# Patient Record
Sex: FEMALE | Race: WHITE | NOT HISPANIC OR LATINO | Employment: OTHER | URBAN - METROPOLITAN AREA
[De-identification: names, ages, dates, MRNs, and addresses within clinical notes are randomized per-mention and may not be internally consistent; named-entity substitution may affect disease eponyms.]

---

## 2017-01-09 ENCOUNTER — OFFICE VISIT (OUTPATIENT)
Dept: INTERNAL MEDICINE | Facility: CLINIC | Age: 62
End: 2017-01-09

## 2017-01-09 VITALS
HEIGHT: 65 IN | WEIGHT: 181 LBS | DIASTOLIC BLOOD PRESSURE: 72 MMHG | BODY MASS INDEX: 30.16 KG/M2 | SYSTOLIC BLOOD PRESSURE: 130 MMHG | HEART RATE: 60 BPM

## 2017-01-09 DIAGNOSIS — K21.9 GERD WITHOUT ESOPHAGITIS: ICD-10-CM

## 2017-01-09 DIAGNOSIS — E03.8 OTHER SPECIFIED HYPOTHYROIDISM: ICD-10-CM

## 2017-01-09 DIAGNOSIS — G43.109 MIGRAINE WITH AURA AND WITHOUT STATUS MIGRAINOSUS, NOT INTRACTABLE: ICD-10-CM

## 2017-01-09 DIAGNOSIS — R73.02 GLUCOSE INTOLERANCE (IMPAIRED GLUCOSE TOLERANCE): ICD-10-CM

## 2017-01-09 DIAGNOSIS — M25.50 ARTHRALGIA, UNSPECIFIED JOINT: ICD-10-CM

## 2017-01-09 DIAGNOSIS — I10 ESSENTIAL HYPERTENSION: Primary | ICD-10-CM

## 2017-01-09 DIAGNOSIS — D75.1 POLYCYTHEMIA: ICD-10-CM

## 2017-01-09 DIAGNOSIS — E78.5 ELEVATED LIPIDS: ICD-10-CM

## 2017-01-09 LAB
25(OH)D3 SERPL-MCNC: 36.4 NG/ML
ALBUMIN SERPL-MCNC: 4.3 G/DL (ref 3.2–4.8)
ALBUMIN/GLOB SERPL: 1.8 G/DL (ref 1.5–2.5)
ALP SERPL-CCNC: 79 U/L (ref 25–100)
ALT SERPL W P-5'-P-CCNC: 25 U/L (ref 7–40)
ANION GAP SERPL CALCULATED.3IONS-SCNC: 6 MMOL/L (ref 3–11)
ARTICHOKE IGE QN: 141 MG/DL (ref 0–130)
AST SERPL-CCNC: 24 U/L (ref 0–33)
BILIRUB SERPL-MCNC: 0.5 MG/DL (ref 0.3–1.2)
BUN BLD-MCNC: 20 MG/DL (ref 9–23)
BUN/CREAT SERPL: 25 (ref 7–25)
CALCIUM SPEC-SCNC: 9.5 MG/DL (ref 8.7–10.4)
CHLORIDE SERPL-SCNC: 104 MMOL/L (ref 99–109)
CHOLEST SERPL-MCNC: 251 MG/DL (ref 0–200)
CO2 SERPL-SCNC: 38 MMOL/L (ref 20–31)
CREAT BLD-MCNC: 0.8 MG/DL (ref 0.6–1.3)
DEPRECATED RDW RBC AUTO: 42.2 FL (ref 37–54)
ERYTHROCYTE [DISTWIDTH] IN BLOOD BY AUTOMATED COUNT: 13.3 % (ref 11.3–14.5)
GFR SERPL CREATININE-BSD FRML MDRD: 73 ML/MIN/1.73
GLOBULIN UR ELPH-MCNC: 2.4 GM/DL
GLUCOSE BLD-MCNC: 106 MG/DL (ref 70–100)
HCT VFR BLD AUTO: 42.2 % (ref 34.5–44)
HDLC SERPL-MCNC: 80 MG/DL (ref 40–60)
HGB BLD-MCNC: 14.7 G/DL (ref 11.5–15.5)
MCH RBC QN AUTO: 30.4 PG (ref 27–31)
MCHC RBC AUTO-ENTMCNC: 34.8 G/DL (ref 32–36)
MCV RBC AUTO: 87.4 FL (ref 80–99)
PLATELET # BLD AUTO: 229 10*3/MM3 (ref 150–450)
PMV BLD AUTO: 10.4 FL (ref 6–12)
POTASSIUM BLD-SCNC: 4.2 MMOL/L (ref 3.5–5.5)
PROT SERPL-MCNC: 6.7 G/DL (ref 5.7–8.2)
RBC # BLD AUTO: 4.83 10*6/MM3 (ref 3.89–5.14)
SODIUM BLD-SCNC: 148 MMOL/L (ref 132–146)
TRIGL SERPL-MCNC: 104 MG/DL (ref 0–150)
TSH SERPL DL<=0.05 MIU/L-ACNC: 2.53 MIU/ML (ref 0.35–5.35)
VIT B12 BLD-MCNC: 1848 PG/ML (ref 211–911)
WBC NRBC COR # BLD: 7.39 10*3/MM3 (ref 3.5–10.8)

## 2017-01-09 PROCEDURE — 82306 VITAMIN D 25 HYDROXY: CPT | Performed by: INTERNAL MEDICINE

## 2017-01-09 PROCEDURE — 99214 OFFICE O/P EST MOD 30 MIN: CPT | Performed by: INTERNAL MEDICINE

## 2017-01-09 PROCEDURE — 82607 VITAMIN B-12: CPT | Performed by: INTERNAL MEDICINE

## 2017-01-09 PROCEDURE — 80061 LIPID PANEL: CPT | Performed by: INTERNAL MEDICINE

## 2017-01-09 PROCEDURE — 85027 COMPLETE CBC AUTOMATED: CPT | Performed by: INTERNAL MEDICINE

## 2017-01-09 PROCEDURE — 84443 ASSAY THYROID STIM HORMONE: CPT | Performed by: INTERNAL MEDICINE

## 2017-01-09 PROCEDURE — 80053 COMPREHEN METABOLIC PANEL: CPT | Performed by: INTERNAL MEDICINE

## 2017-01-09 PROCEDURE — 36415 COLL VENOUS BLD VENIPUNCTURE: CPT | Performed by: INTERNAL MEDICINE

## 2017-01-09 RX ORDER — PRAVASTATIN SODIUM 40 MG
40 TABLET ORAL DAILY
Qty: 90 TABLET | Refills: 3 | Status: SHIPPED | OUTPATIENT
Start: 2017-01-09 | End: 2017-08-21 | Stop reason: SDUPTHER

## 2017-01-09 NOTE — PROGRESS NOTES
Patient is a 61 y.o. female who is here for a follow up of chronic conditions.  Chief Complaint   Patient presents with   • Hypertension   • Hypothyroidism   • Hyperlipidemia         HPI:  Here for f/u.  Doing ok.  Energy level is good.  No cold intolerance.  No palpitations.  BP has been ok.  No dizziness or lightheadedness.  No HA's.  GERD is controlled. Little activity over the winter.     History:    Patient Active Problem List   Diagnosis   • Elevated lipids   • GERD without esophagitis   • Hypertension   • Hypothyroidism   • Glucose intolerance (impaired glucose tolerance)   • Polycythemia   • Migraine with aura   • Joint pain   • Maxillary sinusitis, acute       Past Medical History   Diagnosis Date   • Chemical exposure      phenteramine   • Fibrocystic breast    • Joint pain    • Migraine with aura    • Needle stick injury        Past Surgical History   Procedure Laterality Date   • Tubal abdominal ligation  1980   • Incisional breast biopsy Left 1998   • Breast biopsy Left    • Breast excisional biopsy     • Breast biopsy Right    • Abdominal hysterectomy Bilateral 2007     Removal Of Both Ovaries       Current Outpatient Prescriptions on File Prior to Visit   Medication Sig   • bisoprolol-hydrochlorothiazide (ZIAC) 5-6.25 MG per tablet daily.   • estradiol (CLIMARA) 0.1 MG/24HR patch 1 patch 1 (one) time per week.   • levothyroxine (SYNTHROID, LEVOTHROID) 150 MCG tablet Take 1 tablet by mouth daily.   • losartan-hydrochlorothiazide (HYZAAR) 100-25 MG per tablet daily.   • omeprazole (PriLOSEC) 40 MG capsule daily.   • traMADol (ULTRAM) 50 MG tablet TAKE ONE TABLET BY MOUTH THREE TIMES DAILY AS NEEDED   • [DISCONTINUED] amoxicillin (AMOXIL) 500 MG capsule    • [DISCONTINUED] doxycycline (VIBRAMYCIN) 100 MG capsule Take 1 capsule by mouth 2 (two) times a day.   • [DISCONTINUED] rosuvastatin (CRESTOR) 5 MG tablet every night.     No current facility-administered medications on file prior to visit.   "      Family History   Problem Relation Age of Onset   • Heart disease Other    • Diabetes Other    • Stroke Other    • Breast cancer Other    • Breast cancer Paternal Aunt 50       Social History     Social History   • Marital status:      Spouse name: N/A   • Number of children: N/A   • Years of education: N/A     Occupational History   • Not on file.     Social History Main Topics   • Smoking status: Former Smoker   • Smokeless tobacco: Not on file   • Alcohol use Not on file   • Drug use: No   • Sexual activity: Not on file     Other Topics Concern   • Not on file     Social History Narrative         ROS:    Review of Systems   Constitutional: Positive for fatigue. Negative for chills, diaphoresis, fever and unexpected weight change.   HENT: Negative for congestion, ear pain, facial swelling, hearing loss, nosebleeds, postnasal drip, sinus pressure and sore throat.    Eyes: Negative for pain, discharge and itching.   Respiratory: Negative for cough, chest tightness, shortness of breath and wheezing.    Cardiovascular: Negative for chest pain, palpitations and leg swelling.   Gastrointestinal: Negative for abdominal distention, abdominal pain, blood in stool, constipation, diarrhea, nausea and vomiting.        3/17 colonoscopy due   Endocrine: Negative for polydipsia and polyuria.   Genitourinary: Negative for difficulty urinating, dysuria, frequency and hematuria.        11/16 mammogram   Musculoskeletal: Positive for arthralgias. Negative for back pain, gait problem, joint swelling and myalgias.   Skin: Negative for rash and wound.   Neurological: Negative for dizziness, syncope, weakness and headaches.   Psychiatric/Behavioral: Negative for dysphoric mood and sleep disturbance. The patient is not nervous/anxious.        Visit Vitals   • /72 (BP Location: Left arm, Patient Position: Sitting)   • Pulse 60   • Ht 65\" (165.1 cm)   • Wt 181 lb (82.1 kg)   • LMP  (LMP Unknown)   • Breastfeeding Yes   • " BMI 30.12 kg/m2       Physical Exam:    Physical Exam   Constitutional: She is oriented to person, place, and time. She appears well-developed and well-nourished.   HENT:   Head: Normocephalic and atraumatic.   Right Ear: External ear normal.   Left Ear: External ear normal.   Mouth/Throat: Oropharynx is clear and moist.   Eyes: Conjunctivae and EOM are normal.   Neck: Normal range of motion. Neck supple.   Cardiovascular: Normal rate, regular rhythm and normal heart sounds.    Pulmonary/Chest: Effort normal and breath sounds normal.   Abdominal: Soft. Bowel sounds are normal.   Musculoskeletal: Normal range of motion.   Lymphadenopathy:     She has no cervical adenopathy.   Neurological: She is alert and oriented to person, place, and time.   Skin: Skin is warm and dry.   Psychiatric: She has a normal mood and affect. Her behavior is normal. Thought content normal.       Procedure:      Discussion/Summary:  hyperlipidemia-labs today, counseled on diet  htn-advised low NA and exercise  hypothryoid-lab today  gerd-stable  abnormal H/H-labs today  high risk meds- labs today      labs noted and dw patient , will rx pravastatin          Current Outpatient Prescriptions:   •  bisoprolol-hydrochlorothiazide (ZIAC) 5-6.25 MG per tablet, daily., Disp: , Rfl:   •  estradiol (CLIMARA) 0.1 MG/24HR patch, 1 patch 1 (one) time per week., Disp: , Rfl:   •  levothyroxine (SYNTHROID, LEVOTHROID) 150 MCG tablet, Take 1 tablet by mouth daily., Disp: 90 tablet, Rfl: 1  •  losartan-hydrochlorothiazide (HYZAAR) 100-25 MG per tablet, daily., Disp: , Rfl:   •  omeprazole (PriLOSEC) 40 MG capsule, daily., Disp: , Rfl:   •  traMADol (ULTRAM) 50 MG tablet, TAKE ONE TABLET BY MOUTH THREE TIMES DAILY AS NEEDED, Disp: 90 tablet, Rfl: 1  •  pravastatin (PRAVACHOL) 40 MG tablet, Take 1 tablet by mouth Daily., Disp: 90 tablet, Rfl: 3        Demetria was seen today for hypertension, hypothyroidism and hyperlipidemia.    Diagnoses and all orders for  this visit:    Essential hypertension  -     CBC (No Diff)    Migraine with aura and without status migrainosus, not intractable    GERD without esophagitis    Glucose intolerance (impaired glucose tolerance)    Other specified hypothyroidism    Arthralgia, unspecified joint  -     Vitamin D 25 Hydroxy    Polycythemia  -     Vitamin B12    Elevated lipids  -     Comprehensive Metabolic Panel  -     Lipid Panel  -     TSH  -     pravastatin (PRAVACHOL) 40 MG tablet; Take 1 tablet by mouth Daily.

## 2017-03-07 ENCOUNTER — OFFICE VISIT (OUTPATIENT)
Dept: INTERNAL MEDICINE | Facility: CLINIC | Age: 62
End: 2017-03-07

## 2017-03-07 VITALS
WEIGHT: 181 LBS | HEIGHT: 65 IN | BODY MASS INDEX: 30.16 KG/M2 | SYSTOLIC BLOOD PRESSURE: 125 MMHG | DIASTOLIC BLOOD PRESSURE: 74 MMHG | HEART RATE: 60 BPM

## 2017-03-07 DIAGNOSIS — I10 ESSENTIAL HYPERTENSION: Primary | ICD-10-CM

## 2017-03-07 DIAGNOSIS — R73.02 GLUCOSE INTOLERANCE (IMPAIRED GLUCOSE TOLERANCE): ICD-10-CM

## 2017-03-07 DIAGNOSIS — F17.201 TOBACCO ABUSE, IN REMISSION: ICD-10-CM

## 2017-03-07 DIAGNOSIS — M25.511 ACUTE PAIN OF RIGHT SHOULDER: ICD-10-CM

## 2017-03-07 DIAGNOSIS — K21.9 GERD WITHOUT ESOPHAGITIS: ICD-10-CM

## 2017-03-07 DIAGNOSIS — Z87.891 HISTORY OF TOBACCO USE: ICD-10-CM

## 2017-03-07 DIAGNOSIS — M25.50 ARTHRALGIA, UNSPECIFIED JOINT: ICD-10-CM

## 2017-03-07 DIAGNOSIS — E03.8 OTHER SPECIFIED HYPOTHYROIDISM: ICD-10-CM

## 2017-03-07 DIAGNOSIS — E78.5 ELEVATED LIPIDS: ICD-10-CM

## 2017-03-07 DIAGNOSIS — G43.109 MIGRAINE WITH AURA AND WITHOUT STATUS MIGRAINOSUS, NOT INTRACTABLE: ICD-10-CM

## 2017-03-07 LAB
ALBUMIN SERPL-MCNC: 4.4 G/DL (ref 3.2–4.8)
ALBUMIN/GLOB SERPL: 1.7 G/DL (ref 1.5–2.5)
ALP SERPL-CCNC: 70 U/L (ref 25–100)
ALT SERPL W P-5'-P-CCNC: 26 U/L (ref 7–40)
ANION GAP SERPL CALCULATED.3IONS-SCNC: 1 MMOL/L (ref 3–11)
ARTICHOKE IGE QN: 100 MG/DL (ref 0–130)
AST SERPL-CCNC: 26 U/L (ref 0–33)
BILIRUB SERPL-MCNC: 0.6 MG/DL (ref 0.3–1.2)
BUN BLD-MCNC: 23 MG/DL (ref 9–23)
BUN/CREAT SERPL: 25.6 (ref 7–25)
CALCIUM SPEC-SCNC: 10.2 MG/DL (ref 8.7–10.4)
CHLORIDE SERPL-SCNC: 100 MMOL/L (ref 99–109)
CHOLEST SERPL-MCNC: 194 MG/DL (ref 0–200)
CO2 SERPL-SCNC: 37 MMOL/L (ref 20–31)
CREAT BLD-MCNC: 0.9 MG/DL (ref 0.6–1.3)
GFR SERPL CREATININE-BSD FRML MDRD: 63 ML/MIN/1.73
GLOBULIN UR ELPH-MCNC: 2.6 GM/DL
GLUCOSE BLD-MCNC: 108 MG/DL (ref 70–100)
HDLC SERPL-MCNC: 77 MG/DL (ref 40–60)
POTASSIUM BLD-SCNC: 4 MMOL/L (ref 3.5–5.5)
PROT SERPL-MCNC: 7 G/DL (ref 5.7–8.2)
SODIUM BLD-SCNC: 138 MMOL/L (ref 132–146)
TRIGL SERPL-MCNC: 132 MG/DL (ref 0–150)

## 2017-03-07 PROCEDURE — 99214 OFFICE O/P EST MOD 30 MIN: CPT | Performed by: INTERNAL MEDICINE

## 2017-03-07 PROCEDURE — 80053 COMPREHEN METABOLIC PANEL: CPT | Performed by: INTERNAL MEDICINE

## 2017-03-07 PROCEDURE — 36415 COLL VENOUS BLD VENIPUNCTURE: CPT | Performed by: INTERNAL MEDICINE

## 2017-03-07 PROCEDURE — 80061 LIPID PANEL: CPT | Performed by: INTERNAL MEDICINE

## 2017-03-07 NOTE — PROGRESS NOTES
Patient is a 61 y.o. female who is here for a follow up of chronic conditions.  Chief Complaint   Patient presents with   • Hypertension   • Hypothyroidism   • Hyperlipidemia         HPI:  Here for f/u.  Patient c/o 3 month hx of shoulder pain. Using motrin with some improvement.  Also will use tramadol.  No myalgia with the statin.  No dizziness or lightheadedness.  No abdominal pains.     History:    Patient Active Problem List   Diagnosis   • Elevated lipids   • GERD without esophagitis   • Hypertension   • Hypothyroidism   • Glucose intolerance (impaired glucose tolerance)   • Polycythemia   • Migraine with aura   • Joint pain   • Acute pain of right shoulder       Past Medical History   Diagnosis Date   • Chemical exposure      phenteramine   • Fibrocystic breast    • Joint pain    • Migraine with aura    • Needle stick injury        Past Surgical History   Procedure Laterality Date   • Tubal abdominal ligation  1980   • Incisional breast biopsy Left 1998   • Breast biopsy Left    • Breast excisional biopsy     • Breast biopsy Right    • Abdominal hysterectomy Bilateral 2007     Removal Of Both Ovaries       Current Outpatient Prescriptions on File Prior to Visit   Medication Sig   • bisoprolol-hydrochlorothiazide (ZIAC) 5-6.25 MG per tablet daily.   • estradiol (CLIMARA) 0.1 MG/24HR patch 1 patch 1 (one) time per week.   • levothyroxine (SYNTHROID, LEVOTHROID) 150 MCG tablet Take 1 tablet by mouth daily.   • losartan-hydrochlorothiazide (HYZAAR) 100-25 MG per tablet daily.   • omeprazole (PriLOSEC) 40 MG capsule daily.   • pravastatin (PRAVACHOL) 40 MG tablet Take 1 tablet by mouth Daily.   • traMADol (ULTRAM) 50 MG tablet TAKE ONE TABLET BY MOUTH THREE TIMES DAILY AS NEEDED     No current facility-administered medications on file prior to visit.        Family History   Problem Relation Age of Onset   • Heart disease Other    • Diabetes Other    • Stroke Other    • Breast cancer Other    • Breast cancer  "Paternal Aunt 50       Social History     Social History   • Marital status:      Spouse name: N/A   • Number of children: N/A   • Years of education: N/A     Occupational History   • Not on file.     Social History Main Topics   • Smoking status: Former Smoker   • Smokeless tobacco: Not on file   • Alcohol use Not on file   • Drug use: No   • Sexual activity: Not on file     Other Topics Concern   • Not on file     Social History Narrative         ROS:    Review of Systems   Constitutional: Positive for fatigue. Negative for chills, diaphoresis, fever and unexpected weight change.   HENT: Negative for congestion, ear pain, facial swelling, hearing loss, nosebleeds, postnasal drip, sinus pressure and sore throat.    Eyes: Negative for pain, discharge and itching.   Respiratory: Negative for cough, chest tightness, shortness of breath and wheezing.    Cardiovascular: Negative for chest pain, palpitations and leg swelling.   Gastrointestinal: Negative for abdominal distention, abdominal pain, blood in stool, constipation, diarrhea, nausea and vomiting.        3/17 colonoscopy due   Endocrine: Negative for polydipsia and polyuria.   Genitourinary: Negative for difficulty urinating, dysuria, frequency and hematuria.        11/16 mammogram   Musculoskeletal: Positive for arthralgias. Negative for back pain, gait problem, joint swelling and myalgias.   Skin: Negative for rash and wound.   Neurological: Negative for dizziness, syncope, weakness and headaches.   Psychiatric/Behavioral: Negative for dysphoric mood and sleep disturbance. The patient is not nervous/anxious.        Visit Vitals   • /74   • Pulse 60   • Ht 65\" (165.1 cm)   • Wt 181 lb (82.1 kg)   • LMP  (LMP Unknown)   • BMI 30.12 kg/m2       Physical Exam:    Physical Exam   Constitutional: She is oriented to person, place, and time. She appears well-developed and well-nourished.   HENT:   Head: Normocephalic and atraumatic.   Right Ear: External " ear normal.   Left Ear: External ear normal.   Mouth/Throat: Oropharynx is clear and moist.   Eyes: Conjunctivae and EOM are normal.   Neck: Normal range of motion. Neck supple.   Cardiovascular: Normal rate, regular rhythm and normal heart sounds.    Pulmonary/Chest: Effort normal and breath sounds normal.   Abdominal: Soft. Bowel sounds are normal.   Musculoskeletal: Normal range of motion. She exhibits tenderness (pain on rom of right shoulder).   Lymphadenopathy:     She has no cervical adenopathy.   Neurological: She is alert and oriented to person, place, and time.   Skin: Skin is warm and dry.   Psychiatric: She has a normal mood and affect. Her behavior is normal. Thought content normal.       Procedure:      Discussion/Summary:  hyperlipidemia-labs today, counseled on diet  htn-advised low NA and exercise  hypothryoid-lab noted  gerd-stable  abnormal H/H-labs noted  Shoulder pain-xray and f/u ortho  Other-will schedule CT of Lung given tob hx  high risk meds- labs noted      labs noted and dw patient       Current Outpatient Prescriptions:   •  bisoprolol-hydrochlorothiazide (ZIAC) 5-6.25 MG per tablet, daily., Disp: , Rfl:   •  estradiol (CLIMARA) 0.1 MG/24HR patch, 1 patch 1 (one) time per week., Disp: , Rfl:   •  levothyroxine (SYNTHROID, LEVOTHROID) 150 MCG tablet, Take 1 tablet by mouth daily., Disp: 90 tablet, Rfl: 1  •  losartan-hydrochlorothiazide (HYZAAR) 100-25 MG per tablet, daily., Disp: , Rfl:   •  omeprazole (PriLOSEC) 40 MG capsule, daily., Disp: , Rfl:   •  pravastatin (PRAVACHOL) 40 MG tablet, Take 1 tablet by mouth Daily., Disp: 90 tablet, Rfl: 3  •  traMADol (ULTRAM) 50 MG tablet, TAKE ONE TABLET BY MOUTH THREE TIMES DAILY AS NEEDED, Disp: 90 tablet, Rfl: 1        Demetria was seen today for hypertension, hypothyroidism and hyperlipidemia.    Diagnoses and all orders for this visit:    Essential hypertension    Migraine with aura and without status migrainosus, not intractable    GERD  without esophagitis    Glucose intolerance (impaired glucose tolerance)    Other specified hypothyroidism    Arthralgia, unspecified joint    Elevated lipids  -     Comprehensive Metabolic Panel  -     Lipid Panel    Acute pain of right shoulder  -     XR Shoulder 2+ View Right  -     Ambulatory Referral to Orthopedic Surgery    Tobacco abuse, in remission  -     Cancel: CT Chest Low Dose Wo    History of tobacco use  -     CT Chest Low Dose Wo

## 2017-03-14 DIAGNOSIS — E03.8 OTHER SPECIFIED HYPOTHYROIDISM: ICD-10-CM

## 2017-03-14 RX ORDER — LEVOTHYROXINE SODIUM 0.15 MG/1
150 TABLET ORAL DAILY
Qty: 90 TABLET | Refills: 1 | Status: SHIPPED | OUTPATIENT
Start: 2017-03-14 | End: 2017-08-21 | Stop reason: SDUPTHER

## 2017-03-14 RX ORDER — LOSARTAN POTASSIUM AND HYDROCHLOROTHIAZIDE 25; 100 MG/1; MG/1
1 TABLET ORAL DAILY
Qty: 90 TABLET | Refills: 1 | Status: SHIPPED | OUTPATIENT
Start: 2017-03-14 | End: 2017-08-21 | Stop reason: SDUPTHER

## 2017-03-14 RX ORDER — BISOPROLOL FUMARATE AND HYDROCHLOROTHIAZIDE 5; 6.25 MG/1; MG/1
1 TABLET ORAL DAILY
Qty: 90 TABLET | Refills: 1 | Status: SHIPPED | OUTPATIENT
Start: 2017-03-14 | End: 2017-08-21 | Stop reason: SDUPTHER

## 2017-03-20 RX ORDER — TRAMADOL HYDROCHLORIDE 50 MG/1
50 TABLET ORAL EVERY 8 HOURS PRN
Qty: 90 TABLET | Refills: 1 | OUTPATIENT
Start: 2017-03-20 | End: 2017-07-22 | Stop reason: SDUPTHER

## 2017-03-22 ENCOUNTER — HOSPITAL ENCOUNTER (OUTPATIENT)
Dept: CT IMAGING | Facility: HOSPITAL | Age: 62
Discharge: HOME | End: 2017-03-22
Attending: INTERNAL MEDICINE | Admitting: INTERNAL MEDICINE

## 2017-03-22 PROCEDURE — G0297 LDCT FOR LUNG CA SCREEN: HCPCS

## 2017-04-28 RX ORDER — OMEPRAZOLE 40 MG/1
40 CAPSULE, DELAYED RELEASE ORAL DAILY
Qty: 90 CAPSULE | Refills: 2 | Status: SHIPPED | OUTPATIENT
Start: 2017-04-28 | End: 2017-08-21 | Stop reason: SDUPTHER

## 2017-07-24 RX ORDER — TRAMADOL HYDROCHLORIDE 50 MG/1
TABLET ORAL
Qty: 90 TABLET | Refills: 1 | OUTPATIENT
Start: 2017-07-24 | End: 2017-08-21 | Stop reason: SDUPTHER

## 2017-08-21 ENCOUNTER — OFFICE VISIT (OUTPATIENT)
Dept: INTERNAL MEDICINE | Facility: CLINIC | Age: 62
End: 2017-08-21

## 2017-08-21 VITALS
WEIGHT: 175.4 LBS | HEART RATE: 68 BPM | HEIGHT: 65 IN | DIASTOLIC BLOOD PRESSURE: 74 MMHG | BODY MASS INDEX: 29.22 KG/M2 | SYSTOLIC BLOOD PRESSURE: 120 MMHG

## 2017-08-21 DIAGNOSIS — E78.5 ELEVATED LIPIDS: ICD-10-CM

## 2017-08-21 DIAGNOSIS — M15.9 PRIMARY OSTEOARTHRITIS INVOLVING MULTIPLE JOINTS: ICD-10-CM

## 2017-08-21 DIAGNOSIS — G43.109 MIGRAINE WITH AURA AND WITHOUT STATUS MIGRAINOSUS, NOT INTRACTABLE: ICD-10-CM

## 2017-08-21 DIAGNOSIS — E03.8 OTHER SPECIFIED HYPOTHYROIDISM: ICD-10-CM

## 2017-08-21 DIAGNOSIS — R73.02 GLUCOSE INTOLERANCE (IMPAIRED GLUCOSE TOLERANCE): ICD-10-CM

## 2017-08-21 DIAGNOSIS — I10 ESSENTIAL HYPERTENSION: Primary | ICD-10-CM

## 2017-08-21 DIAGNOSIS — K21.9 GERD WITHOUT ESOPHAGITIS: ICD-10-CM

## 2017-08-21 PROCEDURE — 99214 OFFICE O/P EST MOD 30 MIN: CPT | Performed by: INTERNAL MEDICINE

## 2017-08-21 RX ORDER — BISOPROLOL FUMARATE AND HYDROCHLOROTHIAZIDE 5; 6.25 MG/1; MG/1
1 TABLET ORAL DAILY
Qty: 90 TABLET | Refills: 3 | Status: SHIPPED | OUTPATIENT
Start: 2017-08-21 | End: 2017-11-14 | Stop reason: SDUPTHER

## 2017-08-21 RX ORDER — PRAVASTATIN SODIUM 40 MG
40 TABLET ORAL DAILY
Qty: 90 TABLET | Refills: 3 | Status: SHIPPED | OUTPATIENT
Start: 2017-08-21 | End: 2018-11-06 | Stop reason: SDUPTHER

## 2017-08-21 RX ORDER — LOSARTAN POTASSIUM AND HYDROCHLOROTHIAZIDE 25; 100 MG/1; MG/1
1 TABLET ORAL DAILY
Qty: 90 TABLET | Refills: 3 | Status: SHIPPED | OUTPATIENT
Start: 2017-08-21 | End: 2018-09-21 | Stop reason: SDUPTHER

## 2017-08-21 RX ORDER — LEVOTHYROXINE SODIUM 0.15 MG/1
150 TABLET ORAL DAILY
Qty: 90 TABLET | Refills: 1 | Status: SHIPPED | OUTPATIENT
Start: 2017-08-21 | End: 2017-11-03 | Stop reason: SDUPTHER

## 2017-08-21 RX ORDER — OMEPRAZOLE 40 MG/1
40 CAPSULE, DELAYED RELEASE ORAL DAILY
Qty: 90 CAPSULE | Refills: 3 | Status: SHIPPED | OUTPATIENT
Start: 2017-08-21 | End: 2018-11-05 | Stop reason: SDUPTHER

## 2017-08-21 RX ORDER — TRAMADOL HYDROCHLORIDE 50 MG/1
50 TABLET ORAL EVERY 8 HOURS PRN
Qty: 90 TABLET | Refills: 2 | OUTPATIENT
Start: 2017-08-21 | End: 2018-04-09 | Stop reason: SDUPTHER

## 2017-08-21 NOTE — PROGRESS NOTES
Answers for HPI/ROS submitted by the patient on 8/18/2017   Hypertension  Chronicity: chronic  Onset: more than 1 year ago  Progression since onset: waxing and waning  Condition status: controlled  anxiety: No  blurred vision: No  malaise/fatigue: Yes  orthopnea: No  peripheral edema: No  PND: No  sweats: No  Agents associated with hypertension: thyroid hormones  CAD risks: dyslipidemia, family history, obesity  Compliance problems: exercise  Patient is a 61 y.o. female who is here for a follow up of chronic conditions.  Chief Complaint   Patient presents with   • Hypertension   • Hyperlipidemia         HPI:  Here for f/u.  Plans for Right knee partial knee replacement this upcoming fall.  Has exhausted conservative mgmt.  BP has been good.  No dizziness or lightheadedness.  Energy level is ok.  Some heat intolerance.     History:    Patient Active Problem List   Diagnosis   • Elevated lipids   • GERD without esophagitis   • Hypertension   • Hypothyroidism   • Glucose intolerance (impaired glucose tolerance)   • Polycythemia   • Migraine with aura   • Joint pain   • Acute pain of right shoulder       Past Medical History:   Diagnosis Date   • Chemical exposure     phenteramine   • Fibrocystic breast    • Joint pain    • Migraine with aura    • Needle stick injury        Past Surgical History:   Procedure Laterality Date   • ABDOMINAL HYSTERECTOMY Bilateral 2007    Removal Of Both Ovaries   • BREAST BIOPSY Left    • BREAST BIOPSY Right    • BREAST EXCISIONAL BIOPSY     • INCISIONAL BREAST BIOPSY Left 1998   • TUBAL ABDOMINAL LIGATION  1980       Current Outpatient Prescriptions on File Prior to Visit   Medication Sig   • diclofenac (FLECTOR) 1.3 % patch patch Apply 1 patch topically 2 (Two) Times a Day.   • estradiol (CLIMARA) 0.1 MG/24HR patch 1 patch 1 (one) time per week.   • [DISCONTINUED] bisoprolol-hydrochlorothiazide (ZIAC) 5-6.25 MG per tablet Take 1 tablet by mouth Daily.   • [DISCONTINUED] levothyroxine  (SYNTHROID, LEVOTHROID) 150 MCG tablet Take 1 tablet by mouth Daily.   • [DISCONTINUED] losartan-hydrochlorothiazide (HYZAAR) 100-25 MG per tablet Take 1 tablet by mouth Daily.   • [DISCONTINUED] omeprazole (priLOSEC) 40 MG capsule Take 1 capsule by mouth Daily.   • [DISCONTINUED] pravastatin (PRAVACHOL) 40 MG tablet Take 1 tablet by mouth Daily.   • [DISCONTINUED] traMADol (ULTRAM) 50 MG tablet TAKE ONE TABLET BY MOUTH THREE TIMES DAILY AS NEEDED     No current facility-administered medications on file prior to visit.        Family History   Problem Relation Age of Onset   • Heart disease Other    • Diabetes Other    • Stroke Other    • Breast cancer Other    • Breast cancer Paternal Aunt 50       Social History     Social History   • Marital status:      Spouse name: N/A   • Number of children: N/A   • Years of education: N/A     Occupational History   • Not on file.     Social History Main Topics   • Smoking status: Former Smoker   • Smokeless tobacco: Not on file   • Alcohol use Not on file   • Drug use: No   • Sexual activity: Not on file     Other Topics Concern   • Not on file     Social History Narrative         ROS:    Review of Systems   Constitutional: Positive for fatigue. Negative for chills, diaphoresis, fever and unexpected weight change.   HENT: Negative for congestion, ear pain, facial swelling, hearing loss, nosebleeds, postnasal drip, sinus pressure and sore throat.    Eyes: Negative for pain, discharge and itching.   Respiratory: Negative for cough, chest tightness, shortness of breath and wheezing.    Cardiovascular: Negative for chest pain, palpitations and leg swelling.   Gastrointestinal: Negative for abdominal distention, abdominal pain, blood in stool, constipation, diarrhea, nausea and vomiting.        3/17 colonoscopy due   Endocrine: Negative for polydipsia and polyuria.   Genitourinary: Negative for difficulty urinating, dysuria, frequency and hematuria.        11/16 mammogram  "  Musculoskeletal: Positive for arthralgias. Negative for back pain, gait problem, joint swelling, myalgias and neck pain.   Skin: Negative for rash and wound.   Neurological: Negative for dizziness, syncope, weakness and headaches.   Psychiatric/Behavioral: Negative for dysphoric mood and sleep disturbance. The patient is not nervous/anxious.        /74  Pulse 68  Ht 65\" (165.1 cm)  Wt 175 lb 6.4 oz (79.6 kg)  LMP  (LMP Unknown)  BMI 29.19 kg/m2    Physical Exam:    Physical Exam   Constitutional: She is oriented to person, place, and time. She appears well-developed and well-nourished.   HENT:   Head: Normocephalic and atraumatic.   Right Ear: External ear normal.   Left Ear: External ear normal.   Mouth/Throat: Oropharynx is clear and moist.   Eyes: Conjunctivae and EOM are normal.   Neck: Normal range of motion. Neck supple.   Cardiovascular: Normal rate, regular rhythm and normal heart sounds.    Pulmonary/Chest: Effort normal and breath sounds normal.   Abdominal: Soft. Bowel sounds are normal.   Musculoskeletal: Normal range of motion. She exhibits tenderness (pain on rom of right shoulder).   Lymphadenopathy:     She has no cervical adenopathy.   Neurological: She is alert and oriented to person, place, and time.   Skin: Skin is warm and dry.   Psychiatric: She has a normal mood and affect. Her behavior is normal. Thought content normal.       Procedure:      Discussion/Summary:  hyperlipidemia-labs on rtc, counseled on diet  htn-advised low NA and exercise  hypothryoid-lab on rtc  gerd-stable  abnormal H/H-labs noted  high risk meds- labs on rtc      labs noted and dw patient       Current Outpatient Prescriptions:   •  bisoprolol-hydrochlorothiazide (ZIAC) 5-6.25 MG per tablet, Take 1 tablet by mouth Daily., Disp: 90 tablet, Rfl: 3  •  diclofenac (FLECTOR) 1.3 % patch patch, Apply 1 patch topically 2 (Two) Times a Day., Disp: 60 patch, Rfl: 2  •  estradiol (CLIMARA) 0.1 MG/24HR patch, 1 patch 1 " (one) time per week., Disp: , Rfl:   •  levothyroxine (SYNTHROID, LEVOTHROID) 150 MCG tablet, Take 1 tablet by mouth Daily., Disp: 90 tablet, Rfl: 1  •  losartan-hydrochlorothiazide (HYZAAR) 100-25 MG per tablet, Take 1 tablet by mouth Daily., Disp: 90 tablet, Rfl: 3  •  omeprazole (priLOSEC) 40 MG capsule, Take 1 capsule by mouth Daily., Disp: 90 capsule, Rfl: 3  •  pravastatin (PRAVACHOL) 40 MG tablet, Take 1 tablet by mouth Daily., Disp: 90 tablet, Rfl: 3  •  traMADol (ULTRAM) 50 MG tablet, Take 1 tablet by mouth Every 8 (Eight) Hours As Needed for Moderate Pain ., Disp: 90 tablet, Rfl: 2        Demetria was seen today for hypertension and hyperlipidemia.    Diagnoses and all orders for this visit:    Essential hypertension  -     bisoprolol-hydrochlorothiazide (ZIAC) 5-6.25 MG per tablet; Take 1 tablet by mouth Daily.  -     losartan-hydrochlorothiazide (HYZAAR) 100-25 MG per tablet; Take 1 tablet by mouth Daily.    Migraine with aura and without status migrainosus, not intractable    GERD without esophagitis  -     omeprazole (priLOSEC) 40 MG capsule; Take 1 capsule by mouth Daily.    Glucose intolerance (impaired glucose tolerance)    Other specified hypothyroidism  -     levothyroxine (SYNTHROID, LEVOTHROID) 150 MCG tablet; Take 1 tablet by mouth Daily.    Elevated lipids  -     pravastatin (PRAVACHOL) 40 MG tablet; Take 1 tablet by mouth Daily.    Primary osteoarthritis involving multiple joints  -     traMADol (ULTRAM) 50 MG tablet; Take 1 tablet by mouth Every 8 (Eight) Hours As Needed for Moderate Pain .

## 2017-11-01 ENCOUNTER — OFFICE VISIT (OUTPATIENT)
Dept: INTERNAL MEDICINE | Facility: CLINIC | Age: 62
End: 2017-11-01

## 2017-11-01 VITALS
HEIGHT: 65 IN | BODY MASS INDEX: 28.66 KG/M2 | WEIGHT: 172 LBS | SYSTOLIC BLOOD PRESSURE: 134 MMHG | DIASTOLIC BLOOD PRESSURE: 70 MMHG | HEART RATE: 60 BPM

## 2017-11-01 DIAGNOSIS — E78.5 ELEVATED LIPIDS: ICD-10-CM

## 2017-11-01 DIAGNOSIS — K21.9 GERD WITHOUT ESOPHAGITIS: ICD-10-CM

## 2017-11-01 DIAGNOSIS — Z01.818 PRE-OP EXAMINATION: ICD-10-CM

## 2017-11-01 DIAGNOSIS — R79.89 ABNORMAL THYROID BLOOD TEST: ICD-10-CM

## 2017-11-01 DIAGNOSIS — I10 ESSENTIAL HYPERTENSION: Primary | ICD-10-CM

## 2017-11-01 DIAGNOSIS — R73.02 GLUCOSE INTOLERANCE (IMPAIRED GLUCOSE TOLERANCE): ICD-10-CM

## 2017-11-01 DIAGNOSIS — E03.8 OTHER SPECIFIED HYPOTHYROIDISM: ICD-10-CM

## 2017-11-01 DIAGNOSIS — G43.109 MIGRAINE WITH AURA AND WITHOUT STATUS MIGRAINOSUS, NOT INTRACTABLE: ICD-10-CM

## 2017-11-01 LAB
ALBUMIN SERPL-MCNC: 4.3 G/DL (ref 3.2–4.8)
ALBUMIN/GLOB SERPL: 1.9 G/DL (ref 1.5–2.5)
ALP SERPL-CCNC: 69 U/L (ref 25–100)
ALT SERPL W P-5'-P-CCNC: 24 U/L (ref 7–40)
ANION GAP SERPL CALCULATED.3IONS-SCNC: 4 MMOL/L (ref 3–11)
APTT PPP: 27.3 SECONDS (ref 24–31)
ARTICHOKE IGE QN: 102 MG/DL (ref 0–130)
AST SERPL-CCNC: 23 U/L (ref 0–33)
BILIRUB BLD-MCNC: NEGATIVE MG/DL
BILIRUB SERPL-MCNC: 0.6 MG/DL (ref 0.3–1.2)
BUN BLD-MCNC: 23 MG/DL (ref 9–23)
BUN/CREAT SERPL: 28.8 (ref 7–25)
CALCIUM SPEC-SCNC: 9.4 MG/DL (ref 8.7–10.4)
CHLORIDE SERPL-SCNC: 104 MMOL/L (ref 99–109)
CHOLEST SERPL-MCNC: 171 MG/DL (ref 0–200)
CLARITY, POC: CLEAR
CO2 SERPL-SCNC: 32 MMOL/L (ref 20–31)
COLOR UR: YELLOW
CREAT BLD-MCNC: 0.8 MG/DL (ref 0.6–1.3)
DEPRECATED RDW RBC AUTO: 42.7 FL (ref 37–54)
ERYTHROCYTE [DISTWIDTH] IN BLOOD BY AUTOMATED COUNT: 13.2 % (ref 11.3–14.5)
GFR SERPL CREATININE-BSD FRML MDRD: 73 ML/MIN/1.73
GLOBULIN UR ELPH-MCNC: 2.3 GM/DL
GLUCOSE BLD-MCNC: 98 MG/DL (ref 70–100)
GLUCOSE UR STRIP-MCNC: NEGATIVE MG/DL
HBA1C MFR BLD: 5.7 % (ref 4.8–5.6)
HCT VFR BLD AUTO: 42.2 % (ref 34.5–44)
HDLC SERPL-MCNC: 64 MG/DL (ref 40–60)
HGB BLD-MCNC: 14 G/DL (ref 11.5–15.5)
INR PPP: 0.96
KETONES UR QL: NEGATIVE
LEUKOCYTE EST, POC: NEGATIVE
MCH RBC QN AUTO: 29.4 PG (ref 27–31)
MCHC RBC AUTO-ENTMCNC: 33.2 G/DL (ref 32–36)
MCV RBC AUTO: 88.7 FL (ref 80–99)
NITRITE UR-MCNC: NEGATIVE MG/ML
PH UR: 8 [PH] (ref 5–8)
PLATELET # BLD AUTO: 217 10*3/MM3 (ref 150–450)
PMV BLD AUTO: 10.3 FL (ref 6–12)
POTASSIUM BLD-SCNC: 3.8 MMOL/L (ref 3.5–5.5)
PROT SERPL-MCNC: 6.6 G/DL (ref 5.7–8.2)
PROT UR STRIP-MCNC: NEGATIVE MG/DL
PROTHROMBIN TIME: 10.4 SECONDS (ref 9.6–11.5)
RBC # BLD AUTO: 4.76 10*6/MM3 (ref 3.89–5.14)
RBC # UR STRIP: NEGATIVE /UL
SODIUM BLD-SCNC: 140 MMOL/L (ref 132–146)
SP GR UR: 1.01 (ref 1–1.03)
TRIGL SERPL-MCNC: 117 MG/DL (ref 0–150)
TSH SERPL DL<=0.05 MIU/L-ACNC: 5.8 MIU/ML (ref 0.35–5.35)
UROBILINOGEN UR QL: NORMAL
WBC NRBC COR # BLD: 7.75 10*3/MM3 (ref 3.5–10.8)

## 2017-11-01 PROCEDURE — 85610 PROTHROMBIN TIME: CPT | Performed by: INTERNAL MEDICINE

## 2017-11-01 PROCEDURE — 99214 OFFICE O/P EST MOD 30 MIN: CPT | Performed by: INTERNAL MEDICINE

## 2017-11-01 PROCEDURE — 81003 URINALYSIS AUTO W/O SCOPE: CPT | Performed by: INTERNAL MEDICINE

## 2017-11-01 PROCEDURE — 80061 LIPID PANEL: CPT | Performed by: INTERNAL MEDICINE

## 2017-11-01 PROCEDURE — 84443 ASSAY THYROID STIM HORMONE: CPT | Performed by: INTERNAL MEDICINE

## 2017-11-01 PROCEDURE — 85730 THROMBOPLASTIN TIME PARTIAL: CPT | Performed by: INTERNAL MEDICINE

## 2017-11-01 PROCEDURE — 93000 ELECTROCARDIOGRAM COMPLETE: CPT | Performed by: INTERNAL MEDICINE

## 2017-11-01 PROCEDURE — 85027 COMPLETE CBC AUTOMATED: CPT | Performed by: INTERNAL MEDICINE

## 2017-11-01 PROCEDURE — 83036 HEMOGLOBIN GLYCOSYLATED A1C: CPT | Performed by: INTERNAL MEDICINE

## 2017-11-01 PROCEDURE — 80053 COMPREHEN METABOLIC PANEL: CPT | Performed by: INTERNAL MEDICINE

## 2017-11-01 NOTE — PROGRESS NOTES
Patient is a 61 y.o. female who is here for a pre op clearance for R knee replacement.  Chief Complaint   Patient presents with   • Pre-op Exam     R knee replacement         HPI:    Here for pre op for RIGHT TKR on 11/15/17.  No CP.  No MATA.  Rare bruising.  No dysuria.  No hx of DVT.  No hx of constipation.  No difficulty walking 2 blocks or 2 flight of stairs.    History:    Patient Active Problem List   Diagnosis   • Elevated lipids   • GERD without esophagitis   • Hypertension   • Hypothyroidism   • Glucose intolerance (impaired glucose tolerance)   • Polycythemia   • Migraine with aura   • Joint pain   • Acute pain of right shoulder   • Pre-op examination       Past Medical History:   Diagnosis Date   • Chemical exposure     phenteramine   • Fibrocystic breast    • Joint pain    • Migraine with aura    • Needle stick injury        Past Surgical History:   Procedure Laterality Date   • ABDOMINAL HYSTERECTOMY Bilateral 2007    Removal Of Both Ovaries   • BREAST BIOPSY Left    • BREAST BIOPSY Right    • BREAST EXCISIONAL BIOPSY     • INCISIONAL BREAST BIOPSY Left 1998   • TUBAL ABDOMINAL LIGATION  1980       Current Outpatient Prescriptions on File Prior to Visit   Medication Sig   • bisoprolol-hydrochlorothiazide (ZIAC) 5-6.25 MG per tablet Take 1 tablet by mouth Daily.   • diclofenac (FLECTOR) 1.3 % patch patch Apply 1 patch topically 2 (Two) Times a Day.   • estradiol (CLIMARA) 0.1 MG/24HR patch 1 patch 1 (one) time per week.   • losartan-hydrochlorothiazide (HYZAAR) 100-25 MG per tablet Take 1 tablet by mouth Daily.   • omeprazole (priLOSEC) 40 MG capsule Take 1 capsule by mouth Daily.   • pravastatin (PRAVACHOL) 40 MG tablet Take 1 tablet by mouth Daily.   • traMADol (ULTRAM) 50 MG tablet Take 1 tablet by mouth Every 8 (Eight) Hours As Needed for Moderate Pain .   • [DISCONTINUED] levothyroxine (SYNTHROID, LEVOTHROID) 150 MCG tablet Take 1 tablet by mouth Daily.     No current facility-administered  "medications on file prior to visit.        Family History   Problem Relation Age of Onset   • Heart disease Other    • Diabetes Other    • Stroke Other    • Breast cancer Other    • Breast cancer Paternal Aunt 50       Social History     Social History   • Marital status:      Spouse name: N/A   • Number of children: N/A   • Years of education: N/A     Occupational History   • Not on file.     Social History Main Topics   • Smoking status: Former Smoker   • Smokeless tobacco: Not on file   • Alcohol use Not on file   • Drug use: No   • Sexual activity: Not on file     Other Topics Concern   • Not on file     Social History Narrative         ROS:    Review of Systems   Constitutional: Positive for fatigue. Negative for chills, diaphoresis, fever and unexpected weight change.   HENT: Negative for congestion, ear pain, facial swelling, hearing loss, nosebleeds, postnasal drip, sinus pressure and sore throat.    Eyes: Negative for pain, discharge and itching.   Respiratory: Negative for cough, chest tightness, shortness of breath and wheezing.    Cardiovascular: Negative for chest pain, palpitations and leg swelling.   Gastrointestinal: Negative for abdominal distention, abdominal pain, blood in stool, constipation, diarrhea, nausea and vomiting.        3/17 colonoscopy due   Endocrine: Negative for polydipsia and polyuria.   Genitourinary: Negative for difficulty urinating, dysuria, frequency and hematuria.        11/16 mammogram   Musculoskeletal: Positive for arthralgias and gait problem. Negative for back pain, joint swelling, myalgias and neck pain.   Skin: Negative for rash and wound.   Neurological: Negative for dizziness, syncope, weakness and headaches.   Psychiatric/Behavioral: Negative for dysphoric mood and sleep disturbance. The patient is not nervous/anxious.        /70  Pulse 60  Ht 65\" (165.1 cm)  Wt 172 lb (78 kg)  LMP  (LMP Unknown)  BMI 28.62 kg/m2    Physical Exam:    Physical Exam "   Constitutional: She is oriented to person, place, and time. She appears well-developed and well-nourished.   HENT:   Head: Normocephalic and atraumatic.   Right Ear: External ear normal.   Left Ear: External ear normal.   Mouth/Throat: Oropharynx is clear and moist.   Eyes: Conjunctivae and EOM are normal.   Neck: Normal range of motion. Neck supple.   Cardiovascular: Normal rate, regular rhythm and normal heart sounds.    Pulmonary/Chest: Effort normal and breath sounds normal.   Abdominal: Soft. Bowel sounds are normal.   Musculoskeletal: She exhibits tenderness (pain on rom of right shoulder).   Lymphadenopathy:     She has no cervical adenopathy.   Neurological: She is alert and oriented to person, place, and time.   Skin: Skin is warm and dry.   Psychiatric: She has a normal mood and affect. Her behavior is normal. Thought content normal.         Procedure:    ECG 12 Lead  Date/Time: 11/1/2017 9:35 AM  Performed by: PATRICK SOTELO  Authorized by: PATRICK SOTELO   Comparison: not compared with previous ECG   Previous ECG: no previous ECG available  Rhythm: sinus bradycardia  Rate: bradycardic  Conduction: conduction normal  ST Segments: ST segments normal  T Waves: T waves normal  QRS axis: normal  Other: no other findings  Clinical impression: normal ECG            Discussion/Summary:  hyperlipidemia-labs today, counseled on diet  htn-advised low NA and exercise  hypothryoid-lab today  gerd-stable  high risk meds- labs today  Pre-op-labs/cxr today, advised of signs and symptoms of DVT with px per ortho      labs noted and dw patient , will increase the synthroid to 175 mcg    CLEARED FOR SURGERY      Current Outpatient Prescriptions:   •  bisoprolol-hydrochlorothiazide (ZIAC) 5-6.25 MG per tablet, Take 1 tablet by mouth Daily., Disp: 90 tablet, Rfl: 3  •  diclofenac (FLECTOR) 1.3 % patch patch, Apply 1 patch topically 2 (Two) Times a Day., Disp: 60 patch, Rfl: 2  •  estradiol (CLIMARA) 0.1 MG/24HR  patch, 1 patch 1 (one) time per week., Disp: , Rfl:   •  levothyroxine (SYNTHROID, LEVOTHROID) 175 MCG tablet, Take 1 tablet by mouth Daily., Disp: 90 tablet, Rfl: 1  •  losartan-hydrochlorothiazide (HYZAAR) 100-25 MG per tablet, Take 1 tablet by mouth Daily., Disp: 90 tablet, Rfl: 3  •  omeprazole (priLOSEC) 40 MG capsule, Take 1 capsule by mouth Daily., Disp: 90 capsule, Rfl: 3  •  pravastatin (PRAVACHOL) 40 MG tablet, Take 1 tablet by mouth Daily., Disp: 90 tablet, Rfl: 3  •  traMADol (ULTRAM) 50 MG tablet, Take 1 tablet by mouth Every 8 (Eight) Hours As Needed for Moderate Pain ., Disp: 90 tablet, Rfl: 2        Demetria was seen today for pre-op exam.    Diagnoses and all orders for this visit:    Essential hypertension  -     ECG 12 Lead    Migraine with aura and without status migrainosus, not intractable    GERD without esophagitis    Glucose intolerance (impaired glucose tolerance)  -     Hemoglobin A1c    Other specified hypothyroidism  -     TSH  -     levothyroxine (SYNTHROID, LEVOTHROID) 175 MCG tablet; Take 1 tablet by mouth Daily.    Elevated lipids  -     Lipid Panel    Pre-op examination  -     CBC (No Diff)  -     Comprehensive Metabolic Panel  -     Lipid Panel  -     Hemoglobin A1c  -     POC Urinalysis Dipstick, Automated  -     XR Chest PA & Lateral  -     Protime-INR  -     APTT  -     ECG 12 Lead    Abnormal thyroid blood test  -     T4, Free

## 2017-11-02 ENCOUNTER — HOSPITAL ENCOUNTER (OUTPATIENT)
Dept: GENERAL RADIOLOGY | Facility: HOSPITAL | Age: 62
Discharge: HOME | End: 2017-11-02
Attending: INTERNAL MEDICINE | Admitting: INTERNAL MEDICINE

## 2017-11-02 PROCEDURE — 71020 HC CHEST PA AND LATERAL: CPT

## 2017-11-03 RX ORDER — LEVOTHYROXINE SODIUM 175 UG/1
175 TABLET ORAL DAILY
Qty: 90 TABLET | Refills: 1 | Status: SHIPPED | OUTPATIENT
Start: 2017-11-03 | End: 2018-05-07 | Stop reason: SDUPTHER

## 2017-11-14 ENCOUNTER — TELEPHONE (OUTPATIENT)
Dept: INTERNAL MEDICINE | Facility: CLINIC | Age: 62
End: 2017-11-14

## 2017-11-14 DIAGNOSIS — I10 ESSENTIAL HYPERTENSION: ICD-10-CM

## 2017-11-14 RX ORDER — BISOPROLOL FUMARATE AND HYDROCHLOROTHIAZIDE 5; 6.25 MG/1; MG/1
1 TABLET ORAL DAILY
Qty: 90 TABLET | Refills: 3 | Status: SHIPPED | OUTPATIENT
Start: 2017-11-14 | End: 2019-01-16 | Stop reason: SDUPTHER

## 2018-02-05 ENCOUNTER — TRANSCRIBE ORDERS (OUTPATIENT)
Dept: ADMINISTRATIVE | Facility: HOSPITAL | Age: 63
End: 2018-02-05

## 2018-02-05 DIAGNOSIS — Z12.31 VISIT FOR SCREENING MAMMOGRAM: Primary | ICD-10-CM

## 2018-02-21 ENCOUNTER — OFFICE VISIT (OUTPATIENT)
Dept: INTERNAL MEDICINE | Facility: CLINIC | Age: 63
End: 2018-02-21

## 2018-02-21 VITALS
HEIGHT: 65 IN | WEIGHT: 164 LBS | SYSTOLIC BLOOD PRESSURE: 124 MMHG | HEART RATE: 64 BPM | BODY MASS INDEX: 27.32 KG/M2 | DIASTOLIC BLOOD PRESSURE: 64 MMHG

## 2018-02-21 DIAGNOSIS — I10 ESSENTIAL HYPERTENSION: Primary | ICD-10-CM

## 2018-02-21 DIAGNOSIS — G43.109 MIGRAINE WITH AURA AND WITHOUT STATUS MIGRAINOSUS, NOT INTRACTABLE: ICD-10-CM

## 2018-02-21 DIAGNOSIS — R73.02 GLUCOSE INTOLERANCE (IMPAIRED GLUCOSE TOLERANCE): ICD-10-CM

## 2018-02-21 DIAGNOSIS — K21.9 GERD WITHOUT ESOPHAGITIS: ICD-10-CM

## 2018-02-21 DIAGNOSIS — E78.5 ELEVATED LIPIDS: ICD-10-CM

## 2018-02-21 DIAGNOSIS — E03.8 OTHER SPECIFIED HYPOTHYROIDISM: ICD-10-CM

## 2018-02-21 LAB
ANION GAP SERPL CALCULATED.3IONS-SCNC: 6 MMOL/L (ref 3–11)
BUN BLD-MCNC: 27 MG/DL (ref 9–23)
BUN/CREAT SERPL: 33.8 (ref 7–25)
CALCIUM SPEC-SCNC: 9.4 MG/DL (ref 8.7–10.4)
CHLORIDE SERPL-SCNC: 102 MMOL/L (ref 99–109)
CO2 SERPL-SCNC: 33 MMOL/L (ref 20–31)
CREAT BLD-MCNC: 0.8 MG/DL (ref 0.6–1.3)
GFR SERPL CREATININE-BSD FRML MDRD: 72 ML/MIN/1.73
GLUCOSE BLD-MCNC: 111 MG/DL (ref 70–100)
POTASSIUM BLD-SCNC: 3.8 MMOL/L (ref 3.5–5.5)
SODIUM BLD-SCNC: 141 MMOL/L (ref 132–146)
T4 FREE SERPL-MCNC: 1.69 NG/DL (ref 0.89–1.76)
TSH SERPL DL<=0.05 MIU/L-ACNC: 0.17 MIU/ML (ref 0.35–5.35)

## 2018-02-21 PROCEDURE — 84443 ASSAY THYROID STIM HORMONE: CPT | Performed by: INTERNAL MEDICINE

## 2018-02-21 PROCEDURE — 84439 ASSAY OF FREE THYROXINE: CPT | Performed by: INTERNAL MEDICINE

## 2018-02-21 PROCEDURE — 80048 BASIC METABOLIC PNL TOTAL CA: CPT | Performed by: INTERNAL MEDICINE

## 2018-02-21 PROCEDURE — 99214 OFFICE O/P EST MOD 30 MIN: CPT | Performed by: INTERNAL MEDICINE

## 2018-02-21 NOTE — PROGRESS NOTES
Patient is a 62 y.o. female who is here for a follow up of chronic conditions.  Chief Complaint   Patient presents with   • Hypertension   • Hypothyroidism         HPI:    Here for f/u. Doing well.  Energy level is good.  Recovered from right knee surgery.  No abdominal pains.  GERD is controlled.  No dizziness or lightheadedness.  Sleeping well.    History:    Patient Active Problem List   Diagnosis   • Elevated lipids   • GERD without esophagitis   • Hypertension   • Hypothyroidism   • Glucose intolerance (impaired glucose tolerance)   • Polycythemia   • Migraine with aura   • Joint pain   • Acute pain of right shoulder   • Pre-op examination       Past Medical History:   Diagnosis Date   • Chemical exposure     phenteramine   • Fibrocystic breast    • Joint pain    • Migraine with aura    • Needle stick injury        Past Surgical History:   Procedure Laterality Date   • ABDOMINAL HYSTERECTOMY Bilateral 2007    Removal Of Both Ovaries   • BREAST BIOPSY Left    • BREAST BIOPSY Right    • BREAST EXCISIONAL BIOPSY     • INCISIONAL BREAST BIOPSY Left 1998   • TUBAL ABDOMINAL LIGATION  1980       Current Outpatient Prescriptions on File Prior to Visit   Medication Sig   • bisoprolol-hydrochlorothiazide (ZIAC) 5-6.25 MG per tablet Take 1 tablet by mouth Daily.   • estradiol (CLIMARA) 0.1 MG/24HR patch 1 patch 1 (one) time per week.   • levothyroxine (SYNTHROID, LEVOTHROID) 175 MCG tablet Take 1 tablet by mouth Daily.   • losartan-hydrochlorothiazide (HYZAAR) 100-25 MG per tablet Take 1 tablet by mouth Daily.   • omeprazole (priLOSEC) 40 MG capsule Take 1 capsule by mouth Daily.   • pravastatin (PRAVACHOL) 40 MG tablet Take 1 tablet by mouth Daily.   • traMADol (ULTRAM) 50 MG tablet Take 1 tablet by mouth Every 8 (Eight) Hours As Needed for Moderate Pain .   • diclofenac (FLECTOR) 1.3 % patch patch Apply 1 patch topically 2 (Two) Times a Day.     No current facility-administered medications on file prior to visit.   "      Family History   Problem Relation Age of Onset   • Heart disease Other    • Diabetes Other    • Stroke Other    • Breast cancer Other    • Breast cancer Paternal Aunt 50       Social History     Social History   • Marital status:      Spouse name: N/A   • Number of children: N/A   • Years of education: N/A     Occupational History   • Not on file.     Social History Main Topics   • Smoking status: Former Smoker   • Smokeless tobacco: Not on file   • Alcohol use Not on file   • Drug use: No   • Sexual activity: Not on file     Other Topics Concern   • Not on file     Social History Narrative         ROS:    Review of Systems   Constitutional: Negative for chills, diaphoresis, fatigue, fever and unexpected weight change.   HENT: Negative for congestion, ear pain, facial swelling, hearing loss, nosebleeds, postnasal drip, sinus pressure and sore throat.    Eyes: Negative for pain, discharge and itching.   Respiratory: Negative for cough, chest tightness, shortness of breath and wheezing.    Cardiovascular: Negative for chest pain, palpitations and leg swelling.   Gastrointestinal: Negative for abdominal distention, abdominal pain, blood in stool, constipation, diarrhea, nausea and vomiting.        2/18 colonoscopy without polyps, next 2028   Endocrine: Negative for polydipsia and polyuria.   Genitourinary: Negative for difficulty urinating, dysuria, frequency and hematuria.        2/18 mammogram   Musculoskeletal: Positive for arthralgias. Negative for back pain, gait problem, joint swelling, myalgias and neck pain.   Skin: Negative for rash and wound.   Neurological: Negative for dizziness, syncope, weakness and headaches.   Psychiatric/Behavioral: Negative for dysphoric mood and sleep disturbance. The patient is not nervous/anxious.        /64 (BP Location: Left arm, Patient Position: Sitting)  Pulse 64  Ht 165.1 cm (65\")  Wt 74.4 kg (164 lb)  LMP  (LMP Unknown)  BMI 27.29 kg/m2    Physical " Exam:    Physical Exam   Constitutional: She is oriented to person, place, and time. She appears well-developed and well-nourished.   HENT:   Head: Normocephalic and atraumatic.   Right Ear: External ear normal.   Left Ear: External ear normal.   Mouth/Throat: Oropharynx is clear and moist.   Eyes: Conjunctivae and EOM are normal.   Neck: Normal range of motion. Neck supple.   Cardiovascular: Normal rate, regular rhythm and normal heart sounds.    Pulmonary/Chest: Effort normal and breath sounds normal.   Abdominal: Soft. Bowel sounds are normal.   Musculoskeletal: She exhibits tenderness (pain on rom of right shoulder , overall improved).   Lymphadenopathy:     She has no cervical adenopathy.   Neurological: She is alert and oriented to person, place, and time.   Skin: Skin is warm and dry.   Psychiatric: She has a normal mood and affect. Her behavior is normal. Thought content normal.       Procedure:      Discussion/Summary:    hyperlipidemia-labs reviewed, counseled on diet  htn-advised low NA and exercise  hypothryoid-lab today after recent change  gerd-stable  high risk meds- labs today      labs noted and dw patient       Current Outpatient Prescriptions:   •  bisoprolol-hydrochlorothiazide (ZIAC) 5-6.25 MG per tablet, Take 1 tablet by mouth Daily., Disp: 90 tablet, Rfl: 3  •  estradiol (CLIMARA) 0.1 MG/24HR patch, 1 patch 1 (one) time per week., Disp: , Rfl:   •  levothyroxine (SYNTHROID, LEVOTHROID) 175 MCG tablet, Take 1 tablet by mouth Daily., Disp: 90 tablet, Rfl: 1  •  losartan-hydrochlorothiazide (HYZAAR) 100-25 MG per tablet, Take 1 tablet by mouth Daily., Disp: 90 tablet, Rfl: 3  •  omeprazole (priLOSEC) 40 MG capsule, Take 1 capsule by mouth Daily., Disp: 90 capsule, Rfl: 3  •  pravastatin (PRAVACHOL) 40 MG tablet, Take 1 tablet by mouth Daily., Disp: 90 tablet, Rfl: 3  •  traMADol (ULTRAM) 50 MG tablet, Take 1 tablet by mouth Every 8 (Eight) Hours As Needed for Moderate Pain ., Disp: 90 tablet,  Rfl: 2  •  diclofenac (FLECTOR) 1.3 % patch patch, Apply 1 patch topically 2 (Two) Times a Day., Disp: 60 patch, Rfl: 2        Demetria was seen today for hypertension and hypothyroidism.    Diagnoses and all orders for this visit:    Essential hypertension  -     Basic Metabolic Panel    GERD without esophagitis    Migraine with aura and without status migrainosus, not intractable    Glucose intolerance (impaired glucose tolerance)    Other specified hypothyroidism  -     TSH    Elevated lipids

## 2018-02-22 PROBLEM — K57.30 DIVERTICULOSIS OF LARGE INTESTINE WITHOUT HEMORRHAGE: Status: ACTIVE | Noted: 2018-02-22

## 2018-03-01 ENCOUNTER — HOSPITAL ENCOUNTER (OUTPATIENT)
Dept: MAMMOGRAPHY | Facility: HOSPITAL | Age: 63
Discharge: HOME | End: 2018-03-01
Admitting: NURSE PRACTITIONER

## 2018-03-01 DIAGNOSIS — Z12.31 VISIT FOR SCREENING MAMMOGRAM: ICD-10-CM

## 2018-03-01 PROCEDURE — 77063 BREAST TOMOSYNTHESIS BI: CPT

## 2018-03-01 PROCEDURE — 77067 SCR MAMMO BI INCL CAD: CPT | Performed by: RADIOLOGY

## 2018-03-01 PROCEDURE — 77063 BREAST TOMOSYNTHESIS BI: CPT | Performed by: RADIOLOGY

## 2018-03-01 PROCEDURE — 77067 SCR MAMMO BI INCL CAD: CPT

## 2018-04-09 DIAGNOSIS — M15.9 PRIMARY OSTEOARTHRITIS INVOLVING MULTIPLE JOINTS: ICD-10-CM

## 2018-04-09 RX ORDER — TRAMADOL HYDROCHLORIDE 50 MG/1
50 TABLET ORAL EVERY 6 HOURS PRN
Qty: 120 TABLET | Refills: 2 | OUTPATIENT
Start: 2018-04-09 | End: 2018-09-17 | Stop reason: SDUPTHER

## 2018-05-07 DIAGNOSIS — E03.8 OTHER SPECIFIED HYPOTHYROIDISM: ICD-10-CM

## 2018-05-07 RX ORDER — LEVOTHYROXINE SODIUM 175 UG/1
TABLET ORAL
Qty: 90 TABLET | Refills: 1 | Status: SHIPPED | OUTPATIENT
Start: 2018-05-07 | End: 2018-11-05 | Stop reason: SDUPTHER

## 2018-08-06 ENCOUNTER — HOSPITAL ENCOUNTER (INPATIENT)
Facility: HOSPITAL | Age: 63
LOS: 3 days | Discharge: HOME | End: 2018-08-09
Attending: FAMILY MEDICINE | Admitting: INTERNAL MEDICINE

## 2018-08-06 ENCOUNTER — TELEPHONE (OUTPATIENT)
Dept: INTERNAL MEDICINE | Facility: CLINIC | Age: 63
End: 2018-08-06

## 2018-08-06 ENCOUNTER — OFFICE VISIT (OUTPATIENT)
Dept: INTERNAL MEDICINE | Facility: CLINIC | Age: 63
End: 2018-08-06

## 2018-08-06 ENCOUNTER — HOSPITAL ENCOUNTER (OUTPATIENT)
Dept: CT IMAGING | Facility: HOSPITAL | Age: 63
Discharge: HOME | End: 2018-08-06
Attending: INTERNAL MEDICINE

## 2018-08-06 VITALS
SYSTOLIC BLOOD PRESSURE: 124 MMHG | WEIGHT: 159 LBS | HEIGHT: 65 IN | BODY MASS INDEX: 26.49 KG/M2 | HEART RATE: 68 BPM | DIASTOLIC BLOOD PRESSURE: 64 MMHG | TEMPERATURE: 99.7 F

## 2018-08-06 DIAGNOSIS — E03.8 OTHER SPECIFIED HYPOTHYROIDISM: ICD-10-CM

## 2018-08-06 DIAGNOSIS — K21.9 GERD WITHOUT ESOPHAGITIS: ICD-10-CM

## 2018-08-06 DIAGNOSIS — I10 ESSENTIAL HYPERTENSION: ICD-10-CM

## 2018-08-06 DIAGNOSIS — R10.31 RLQ ABDOMINAL PAIN: Primary | ICD-10-CM

## 2018-08-06 DIAGNOSIS — E78.5 ELEVATED LIPIDS: ICD-10-CM

## 2018-08-06 DIAGNOSIS — K57.30 DIVERTICULOSIS OF LARGE INTESTINE WITHOUT HEMORRHAGE: ICD-10-CM

## 2018-08-06 DIAGNOSIS — R73.02 GLUCOSE INTOLERANCE (IMPAIRED GLUCOSE TOLERANCE): ICD-10-CM

## 2018-08-06 DIAGNOSIS — G43.109 MIGRAINE WITH AURA AND WITHOUT STATUS MIGRAINOSUS, NOT INTRACTABLE: ICD-10-CM

## 2018-08-06 PROBLEM — K61.1 RECTAL ABSCESS: Status: ACTIVE | Noted: 2018-08-06

## 2018-08-06 PROBLEM — K62.89 PROCTITIS: Status: ACTIVE | Noted: 2018-08-06

## 2018-08-06 LAB
ALBUMIN SERPL-MCNC: 4.53 G/DL (ref 3.2–4.8)
ALBUMIN/GLOB SERPL: 1.4 G/DL (ref 1.5–2.5)
ALP SERPL-CCNC: 87 U/L (ref 25–100)
ALT SERPL W P-5'-P-CCNC: 16 U/L (ref 7–40)
ANION GAP SERPL CALCULATED.3IONS-SCNC: 13 MMOL/L (ref 3–11)
APTT PPP: 31.3 SECONDS (ref 24–31)
AST SERPL-CCNC: 17 U/L (ref 0–33)
BASOPHILS # BLD AUTO: 0.04 10*3/MM3 (ref 0–0.2)
BASOPHILS NFR BLD AUTO: 0.3 % (ref 0–1)
BILIRUB BLD-MCNC: NEGATIVE MG/DL
BILIRUB SERPL-MCNC: 0.6 MG/DL (ref 0.3–1.2)
BUN BLD-MCNC: 16 MG/DL (ref 9–23)
BUN/CREAT SERPL: 20.3 (ref 7–25)
CALCIUM SPEC-SCNC: 9.9 MG/DL (ref 8.7–10.4)
CHLORIDE SERPL-SCNC: 102 MMOL/L (ref 99–109)
CLARITY, POC: CLEAR
CO2 SERPL-SCNC: 26 MMOL/L (ref 20–31)
COLOR UR: YELLOW
CREAT BLD-MCNC: 0.79 MG/DL (ref 0.6–1.3)
D-LACTATE SERPL-SCNC: 1 MMOL/L (ref 0.5–2)
DEPRECATED RDW RBC AUTO: 41 FL (ref 37–54)
EOSINOPHIL # BLD AUTO: 0.07 10*3/MM3 (ref 0–0.3)
EOSINOPHIL NFR BLD AUTO: 0.5 % (ref 0–3)
ERYTHROCYTE [DISTWIDTH] IN BLOOD BY AUTOMATED COUNT: 12.8 % (ref 11.3–14.5)
GFR SERPL CREATININE-BSD FRML MDRD: 74 ML/MIN/1.73
GLOBULIN UR ELPH-MCNC: 3.2 GM/DL
GLUCOSE BLD-MCNC: 131 MG/DL (ref 70–100)
GLUCOSE UR STRIP-MCNC: NEGATIVE MG/DL
HCT VFR BLD AUTO: 40.2 % (ref 34.5–44)
HGB BLD-MCNC: 13.4 G/DL (ref 11.5–15.5)
IMM GRANULOCYTES # BLD: 0.02 10*3/MM3 (ref 0–0.03)
IMM GRANULOCYTES NFR BLD: 0.2 % (ref 0–0.6)
INR PPP: 0.93 (ref 0.91–1.09)
KETONES UR QL: NEGATIVE
LEUKOCYTE EST, POC: NEGATIVE
LYMPHOCYTES # BLD AUTO: 1.64 10*3/MM3 (ref 0.6–4.8)
LYMPHOCYTES NFR BLD AUTO: 12.5 % (ref 24–44)
MAGNESIUM SERPL-MCNC: 2.2 MG/DL (ref 1.3–2.7)
MCH RBC QN AUTO: 29.5 PG (ref 27–31)
MCHC RBC AUTO-ENTMCNC: 33.3 G/DL (ref 32–36)
MCV RBC AUTO: 88.4 FL (ref 80–99)
MONOCYTES # BLD AUTO: 1.02 10*3/MM3 (ref 0–1)
MONOCYTES NFR BLD AUTO: 7.8 % (ref 0–12)
NEUTROPHILS # BLD AUTO: 10.3 10*3/MM3 (ref 1.5–8.3)
NEUTROPHILS NFR BLD AUTO: 78.9 % (ref 41–71)
NITRITE UR-MCNC: NEGATIVE MG/ML
PH UR: 7 [PH] (ref 5–8)
PLATELET # BLD AUTO: 276 10*3/MM3 (ref 150–450)
PMV BLD AUTO: 10.7 FL (ref 6–12)
POTASSIUM BLD-SCNC: 3.5 MMOL/L (ref 3.5–5.5)
PROT SERPL-MCNC: 7.7 G/DL (ref 5.7–8.2)
PROT UR STRIP-MCNC: NEGATIVE MG/DL
PROTHROMBIN TIME: 9.8 SECONDS (ref 9.6–11.5)
RBC # BLD AUTO: 4.55 10*6/MM3 (ref 3.89–5.14)
RBC # UR STRIP: NEGATIVE /UL
SODIUM BLD-SCNC: 141 MMOL/L (ref 132–146)
SP GR UR: 1 (ref 1–1.03)
UROBILINOGEN UR QL: NORMAL
WBC NRBC COR # BLD: 13.07 10*3/MM3 (ref 3.5–10.8)

## 2018-08-06 PROCEDURE — 85730 THROMBOPLASTIN TIME PARTIAL: CPT | Performed by: FAMILY MEDICINE

## 2018-08-06 PROCEDURE — 85025 COMPLETE CBC W/AUTO DIFF WBC: CPT | Performed by: FAMILY MEDICINE

## 2018-08-06 PROCEDURE — 85610 PROTHROMBIN TIME: CPT | Performed by: FAMILY MEDICINE

## 2018-08-06 PROCEDURE — 74177 CT ABD & PELVIS W/CONTRAST: CPT

## 2018-08-06 PROCEDURE — 93005 ELECTROCARDIOGRAM TRACING: CPT | Performed by: FAMILY MEDICINE

## 2018-08-06 PROCEDURE — 99214 OFFICE O/P EST MOD 30 MIN: CPT | Performed by: INTERNAL MEDICINE

## 2018-08-06 PROCEDURE — 87040 BLOOD CULTURE FOR BACTERIA: CPT | Performed by: FAMILY MEDICINE

## 2018-08-06 PROCEDURE — 81003 URINALYSIS AUTO W/O SCOPE: CPT | Performed by: INTERNAL MEDICINE

## 2018-08-06 PROCEDURE — 83735 ASSAY OF MAGNESIUM: CPT | Performed by: FAMILY MEDICINE

## 2018-08-06 PROCEDURE — 82565 ASSAY OF CREATININE: CPT

## 2018-08-06 PROCEDURE — 83605 ASSAY OF LACTIC ACID: CPT | Performed by: FAMILY MEDICINE

## 2018-08-06 PROCEDURE — 80053 COMPREHEN METABOLIC PANEL: CPT | Performed by: FAMILY MEDICINE

## 2018-08-06 PROCEDURE — 93010 ELECTROCARDIOGRAM REPORT: CPT | Performed by: INTERNAL MEDICINE

## 2018-08-06 PROCEDURE — 99223 1ST HOSP IP/OBS HIGH 75: CPT | Performed by: FAMILY MEDICINE

## 2018-08-06 PROCEDURE — 25010000002 IOPAMIDOL 61 % SOLUTION: Performed by: INTERNAL MEDICINE

## 2018-08-06 PROCEDURE — 81003 URINALYSIS AUTO W/O SCOPE: CPT | Performed by: NURSE PRACTITIONER

## 2018-08-06 RX ORDER — OMEPRAZOLE 40 MG/1
40 CAPSULE, DELAYED RELEASE ORAL DAILY
Qty: 90 CAPSULE | Refills: 3 | Status: CANCELLED | OUTPATIENT
Start: 2018-08-06

## 2018-08-06 RX ORDER — ASPIRIN 81 MG/1
81 TABLET ORAL DAILY
COMMUNITY
End: 2023-03-17

## 2018-08-06 RX ORDER — IBUPROFEN 200 MG
600 TABLET ORAL NIGHTLY PRN
COMMUNITY
End: 2020-02-03

## 2018-08-06 RX ORDER — PHENOL 1.4 %
600 AEROSOL, SPRAY (ML) MUCOUS MEMBRANE NIGHTLY
COMMUNITY

## 2018-08-06 RX ORDER — HYDROMORPHONE HYDROCHLORIDE 1 MG/ML
0.25 INJECTION, SOLUTION INTRAMUSCULAR; INTRAVENOUS; SUBCUTANEOUS
Status: DISCONTINUED | OUTPATIENT
Start: 2018-08-06 | End: 2018-08-09 | Stop reason: HOSPADM

## 2018-08-06 RX ORDER — SENNOSIDES 8.6 MG
1300 CAPSULE ORAL EVERY 12 HOURS PRN
COMMUNITY
End: 2018-08-09 | Stop reason: HOSPADM

## 2018-08-06 RX ORDER — SODIUM CHLORIDE 0.9 % (FLUSH) 0.9 %
1-10 SYRINGE (ML) INJECTION AS NEEDED
Status: DISCONTINUED | OUTPATIENT
Start: 2018-08-06 | End: 2018-08-09 | Stop reason: HOSPADM

## 2018-08-06 RX ORDER — SODIUM CHLORIDE 9 MG/ML
75 INJECTION, SOLUTION INTRAVENOUS CONTINUOUS
Status: DISCONTINUED | OUTPATIENT
Start: 2018-08-06 | End: 2018-08-09 | Stop reason: HOSPADM

## 2018-08-06 RX ADMIN — IOPAMIDOL 90 ML: 612 INJECTION, SOLUTION INTRAVENOUS at 19:33

## 2018-08-06 NOTE — PROGRESS NOTES
Patient is a 62 y.o. female who is here for lower abdominal pain.  Chief Complaint   Patient presents with   • Abdominal Pain         HPI:    Here c/o 4 day hx of lower abdominal pains.  Having low grade temperature.  Worst with BM.  Feels diaphoretic at times.  Worst with walking also.  Has Temp to 101.  No dysuria.  No hematuria.    History:    Patient Active Problem List   Diagnosis   • Elevated lipids   • GERD without esophagitis   • Hypertension   • Hypothyroidism   • Glucose intolerance (impaired glucose tolerance)   • Polycythemia   • Migraine with aura   • Joint pain   • Acute pain of right shoulder   • Pre-op examination   • Diverticulosis of large intestine without hemorrhage   • RLQ abdominal pain       Past Medical History:   Diagnosis Date   • Chemical exposure     phenteramine   • Joint pain    • Migraine with aura    • Needle stick injury        Past Surgical History:   Procedure Laterality Date   • ABDOMINAL HYSTERECTOMY Bilateral 2007    Removal Of Both Ovaries   • BREAST BIOPSY Left    • BREAST BIOPSY Right    • BREAST EXCISIONAL BIOPSY     • INCISIONAL BREAST BIOPSY Left 1998   • OOPHORECTOMY     • TUBAL ABDOMINAL LIGATION  1980       Current Outpatient Prescriptions on File Prior to Visit   Medication Sig   • bisoprolol-hydrochlorothiazide (ZIAC) 5-6.25 MG per tablet Take 1 tablet by mouth Daily.   • diclofenac (FLECTOR) 1.3 % patch patch Apply 1 patch topically 2 (Two) Times a Day.   • estradiol (CLIMARA) 0.1 MG/24HR patch 1 patch 1 (one) time per week.   • levothyroxine (SYNTHROID, LEVOTHROID) 175 MCG tablet TAKE ONE TABLET BY MOUTH ONCE DAILY   • losartan-hydrochlorothiazide (HYZAAR) 100-25 MG per tablet Take 1 tablet by mouth Daily.   • omeprazole (priLOSEC) 40 MG capsule Take 1 capsule by mouth Daily.   • pravastatin (PRAVACHOL) 40 MG tablet Take 1 tablet by mouth Daily.   • traMADol (ULTRAM) 50 MG tablet Take 1 tablet by mouth Every 6 (Six) Hours As Needed for Moderate Pain .     No current  facility-administered medications on file prior to visit.        Family History   Problem Relation Age of Onset   • Heart disease Other    • Diabetes Other    • Stroke Other    • Breast cancer Other    • Breast cancer Paternal Aunt 50       Social History     Social History   • Marital status:      Spouse name: N/A   • Number of children: N/A   • Years of education: N/A     Occupational History   • Not on file.     Social History Main Topics   • Smoking status: Former Smoker   • Smokeless tobacco: Not on file   • Alcohol use Not on file   • Drug use: No   • Sexual activity: Not on file     Other Topics Concern   • Not on file     Social History Narrative   • No narrative on file         Review of Systems   Constitutional: Positive for appetite change, chills and fever. Negative for diaphoresis, fatigue and unexpected weight change.   HENT: Negative for congestion, ear pain, facial swelling, hearing loss, nosebleeds, postnasal drip, sinus pressure and sore throat.    Eyes: Negative for pain, discharge and itching.   Respiratory: Negative for cough, chest tightness, shortness of breath and wheezing.    Cardiovascular: Negative for chest pain, palpitations and leg swelling.   Gastrointestinal: Positive for abdominal pain and constipation. Negative for abdominal distention, blood in stool, diarrhea, nausea and vomiting.        2/18 colonoscopy without polyps, next 2028   Endocrine: Negative for polydipsia and polyuria.   Genitourinary: Negative for difficulty urinating, dysuria, frequency and hematuria.        2/18 mammogram   Musculoskeletal: Positive for arthralgias. Negative for back pain, gait problem, joint swelling, myalgias and neck pain.   Skin: Negative for rash and wound.   Neurological: Negative for dizziness, syncope, weakness and headaches.   Psychiatric/Behavioral: Negative for dysphoric mood and sleep disturbance. The patient is not nervous/anxious.        /64 (BP Location: Left arm, Patient  "Position: Sitting)   Pulse 68   Temp 99.7 °F (37.6 °C) (Temporal Artery )   Ht 165.1 cm (65\")   Wt 72.1 kg (159 lb)   LMP  (LMP Unknown)   BMI 26.46 kg/m²       Physical Exam   Constitutional: She is oriented to person, place, and time. She appears well-developed and well-nourished.   HENT:   Head: Normocephalic and atraumatic.   Right Ear: External ear normal.   Left Ear: External ear normal.   Mouth/Throat: Oropharynx is clear and moist.   Eyes: Conjunctivae and EOM are normal.   Neck: Normal range of motion. Neck supple.   Cardiovascular: Normal rate, regular rhythm and normal heart sounds.    Pulmonary/Chest: Effort normal and breath sounds normal.   Abdominal: Soft. Bowel sounds are normal. There is tenderness (RLQ).   Musculoskeletal: Normal range of motion.   Lymphadenopathy:     She has no cervical adenopathy.   Neurological: She is alert and oriented to person, place, and time.   Skin: Skin is warm and dry.   Psychiatric: She has a normal mood and affect. Her behavior is normal. Thought content normal.       Procedure:      Discussion/Summary:    hyperlipidemia-labs reviewed, counseled on diet  htn-advised low NA and exercise  hypothryoid-lab noted  gerd-stable  RLQ pain-check STAT CT of abd/pelvis  high risk meds- labs noted      labs noted and dw patient  CT results dw patient, and ricardo Mart, will admit to hospital for IV antibiotics    Current Outpatient Prescriptions:   •  bisoprolol-hydrochlorothiazide (ZIAC) 5-6.25 MG per tablet, Take 1 tablet by mouth Daily., Disp: 90 tablet, Rfl: 3  •  diclofenac (FLECTOR) 1.3 % patch patch, Apply 1 patch topically 2 (Two) Times a Day., Disp: 60 patch, Rfl: 2  •  estradiol (CLIMARA) 0.1 MG/24HR patch, 1 patch 1 (one) time per week., Disp: , Rfl:   •  levothyroxine (SYNTHROID, LEVOTHROID) 175 MCG tablet, TAKE ONE TABLET BY MOUTH ONCE DAILY, Disp: 90 tablet, Rfl: 1  •  losartan-hydrochlorothiazide (HYZAAR) 100-25 MG per tablet, Take 1 tablet by mouth Daily., " Disp: 90 tablet, Rfl: 3  •  omeprazole (priLOSEC) 40 MG capsule, Take 1 capsule by mouth Daily., Disp: 90 capsule, Rfl: 3  •  pravastatin (PRAVACHOL) 40 MG tablet, Take 1 tablet by mouth Daily., Disp: 90 tablet, Rfl: 3  •  traMADol (ULTRAM) 50 MG tablet, Take 1 tablet by mouth Every 6 (Six) Hours As Needed for Moderate Pain ., Disp: 120 tablet, Rfl: 2        Demetria was seen today for abdominal pain.    Diagnoses and all orders for this visit:    RLQ abdominal pain  -     CT Abdomen Pelvis With Contrast  -     POC Urinalysis Dipstick, Automated    Essential hypertension  -     POC Urinalysis Dipstick, Automated    Migraine with aura and without status migrainosus, not intractable    Diverticulosis of large intestine without hemorrhage    GERD without esophagitis    Other specified hypothyroidism    Glucose intolerance (impaired glucose tolerance)    Elevated lipids

## 2018-08-07 LAB
ALBUMIN SERPL-MCNC: 4.01 G/DL (ref 3.2–4.8)
ALBUMIN/GLOB SERPL: 1.4 G/DL (ref 1.5–2.5)
ALP SERPL-CCNC: 79 U/L (ref 25–100)
ALT SERPL W P-5'-P-CCNC: 14 U/L (ref 7–40)
ANION GAP SERPL CALCULATED.3IONS-SCNC: 8 MMOL/L (ref 3–11)
AST SERPL-CCNC: 14 U/L (ref 0–33)
BASOPHILS # BLD AUTO: 0.02 10*3/MM3 (ref 0–0.2)
BASOPHILS NFR BLD AUTO: 0.2 % (ref 0–1)
BILIRUB SERPL-MCNC: 0.6 MG/DL (ref 0.3–1.2)
BILIRUB UR QL STRIP: NEGATIVE
BUN BLD-MCNC: 18 MG/DL (ref 9–23)
BUN/CREAT SERPL: 25.4 (ref 7–25)
CALCIUM SPEC-SCNC: 9.1 MG/DL (ref 8.7–10.4)
CHLORIDE SERPL-SCNC: 103 MMOL/L (ref 99–109)
CLARITY UR: CLEAR
CO2 SERPL-SCNC: 29 MMOL/L (ref 20–31)
COLOR UR: YELLOW
CREAT BLD-MCNC: 0.71 MG/DL (ref 0.6–1.3)
DEPRECATED RDW RBC AUTO: 41.2 FL (ref 37–54)
EOSINOPHIL # BLD AUTO: 0.09 10*3/MM3 (ref 0–0.3)
EOSINOPHIL NFR BLD AUTO: 0.9 % (ref 0–3)
ERYTHROCYTE [DISTWIDTH] IN BLOOD BY AUTOMATED COUNT: 12.8 % (ref 11.3–14.5)
GFR SERPL CREATININE-BSD FRML MDRD: 83 ML/MIN/1.73
GLOBULIN UR ELPH-MCNC: 2.8 GM/DL
GLUCOSE BLD-MCNC: 115 MG/DL (ref 70–100)
GLUCOSE UR STRIP-MCNC: NEGATIVE MG/DL
HBA1C MFR BLD: 6 % (ref 4.8–5.6)
HCT VFR BLD AUTO: 37.7 % (ref 34.5–44)
HGB BLD-MCNC: 12.4 G/DL (ref 11.5–15.5)
HGB UR QL STRIP.AUTO: NEGATIVE
IMM GRANULOCYTES # BLD: 0.01 10*3/MM3 (ref 0–0.03)
IMM GRANULOCYTES NFR BLD: 0.1 % (ref 0–0.6)
KETONES UR QL STRIP: ABNORMAL
LEUKOCYTE ESTERASE UR QL STRIP.AUTO: NEGATIVE
LYMPHOCYTES # BLD AUTO: 1.87 10*3/MM3 (ref 0.6–4.8)
LYMPHOCYTES NFR BLD AUTO: 17.8 % (ref 24–44)
MCH RBC QN AUTO: 28.8 PG (ref 27–31)
MCHC RBC AUTO-ENTMCNC: 32.9 G/DL (ref 32–36)
MCV RBC AUTO: 87.5 FL (ref 80–99)
MONOCYTES # BLD AUTO: 0.89 10*3/MM3 (ref 0–1)
MONOCYTES NFR BLD AUTO: 8.5 % (ref 0–12)
NEUTROPHILS # BLD AUTO: 7.66 10*3/MM3 (ref 1.5–8.3)
NEUTROPHILS NFR BLD AUTO: 72.6 % (ref 41–71)
NITRITE UR QL STRIP: NEGATIVE
NRBC BLD MANUAL-RTO: 0 /100 WBC (ref 0–0)
PH UR STRIP.AUTO: 6.5 [PH] (ref 5–8)
PLATELET # BLD AUTO: 252 10*3/MM3 (ref 150–450)
PMV BLD AUTO: 10.6 FL (ref 6–12)
POTASSIUM BLD-SCNC: 3.5 MMOL/L (ref 3.5–5.5)
PROT SERPL-MCNC: 6.8 G/DL (ref 5.7–8.2)
PROT UR QL STRIP: NEGATIVE
RBC # BLD AUTO: 4.31 10*6/MM3 (ref 3.89–5.14)
SODIUM BLD-SCNC: 140 MMOL/L (ref 132–146)
SP GR UR STRIP: 1.05 (ref 1–1.03)
UROBILINOGEN UR QL STRIP: ABNORMAL
WBC NRBC COR # BLD: 10.53 10*3/MM3 (ref 3.5–10.8)

## 2018-08-07 PROCEDURE — 25010000002 HYDROMORPHONE PER 4 MG: Performed by: NURSE PRACTITIONER

## 2018-08-07 PROCEDURE — 80053 COMPREHEN METABOLIC PANEL: CPT | Performed by: FAMILY MEDICINE

## 2018-08-07 PROCEDURE — 85025 COMPLETE CBC W/AUTO DIFF WBC: CPT | Performed by: FAMILY MEDICINE

## 2018-08-07 PROCEDURE — 83036 HEMOGLOBIN GLYCOSYLATED A1C: CPT | Performed by: FAMILY MEDICINE

## 2018-08-07 PROCEDURE — 25010000003 POTASSIUM CHLORIDE 10 MEQ/100ML SOLUTION: Performed by: NURSE PRACTITIONER

## 2018-08-07 PROCEDURE — 25010000002 PIPERACILLIN SOD-TAZOBACTAM PER 1 G: Performed by: NURSE PRACTITIONER

## 2018-08-07 PROCEDURE — 99233 SBSQ HOSP IP/OBS HIGH 50: CPT | Performed by: INTERNAL MEDICINE

## 2018-08-07 RX ORDER — POTASSIUM CHLORIDE 7.45 MG/ML
10 INJECTION INTRAVENOUS
Status: DISCONTINUED | OUTPATIENT
Start: 2018-08-07 | End: 2018-08-09 | Stop reason: HOSPADM

## 2018-08-07 RX ORDER — ACETAMINOPHEN 325 MG/1
650 TABLET ORAL EVERY 6 HOURS PRN
Status: DISCONTINUED | OUTPATIENT
Start: 2018-08-07 | End: 2018-08-09 | Stop reason: HOSPADM

## 2018-08-07 RX ORDER — POTASSIUM CHLORIDE 750 MG/1
40 CAPSULE, EXTENDED RELEASE ORAL AS NEEDED
Status: DISCONTINUED | OUTPATIENT
Start: 2018-08-07 | End: 2018-08-09 | Stop reason: HOSPADM

## 2018-08-07 RX ORDER — POTASSIUM CHLORIDE 1.5 G/1.77G
40 POWDER, FOR SOLUTION ORAL AS NEEDED
Status: DISCONTINUED | OUTPATIENT
Start: 2018-08-07 | End: 2018-08-09 | Stop reason: HOSPADM

## 2018-08-07 RX ADMIN — POTASSIUM CHLORIDE 10 MEQ: 7.46 INJECTION, SOLUTION INTRAVENOUS at 05:02

## 2018-08-07 RX ADMIN — POLYETHYLENE GLYCOL (3350) 17 G: 17 POWDER, FOR SOLUTION ORAL at 21:22

## 2018-08-07 RX ADMIN — HYDROMORPHONE HYDROCHLORIDE 0.25 MG: 1 INJECTION, SOLUTION INTRAMUSCULAR; INTRAVENOUS; SUBCUTANEOUS at 07:59

## 2018-08-07 RX ADMIN — SODIUM CHLORIDE 100 ML/HR: 9 INJECTION, SOLUTION INTRAVENOUS at 00:02

## 2018-08-07 RX ADMIN — TAZOBACTAM SODIUM AND PIPERACILLIN SODIUM 3.38 G: 375; 3 INJECTION, SOLUTION INTRAVENOUS at 21:22

## 2018-08-07 RX ADMIN — POTASSIUM CHLORIDE 10 MEQ: 7.46 INJECTION, SOLUTION INTRAVENOUS at 04:00

## 2018-08-07 RX ADMIN — ACETAMINOPHEN 650 MG: 325 TABLET, FILM COATED ORAL at 16:51

## 2018-08-07 RX ADMIN — TAZOBACTAM SODIUM AND PIPERACILLIN SODIUM 3.38 G: 375; 3 INJECTION, SOLUTION INTRAVENOUS at 14:35

## 2018-08-07 RX ADMIN — HYDROMORPHONE HYDROCHLORIDE 0.25 MG: 1 INJECTION, SOLUTION INTRAMUSCULAR; INTRAVENOUS; SUBCUTANEOUS at 12:32

## 2018-08-07 RX ADMIN — TAZOBACTAM SODIUM AND PIPERACILLIN SODIUM 3.38 G: 375; 3 INJECTION, SOLUTION INTRAVENOUS at 06:27

## 2018-08-07 RX ADMIN — TAZOBACTAM SODIUM AND PIPERACILLIN SODIUM 3.38 G: 375; 3 INJECTION, SOLUTION INTRAVENOUS at 00:02

## 2018-08-07 RX ADMIN — POLYETHYLENE GLYCOL (3350) 17 G: 17 POWDER, FOR SOLUTION ORAL at 14:35

## 2018-08-07 RX ADMIN — SODIUM CHLORIDE 100 ML/HR: 9 INJECTION, SOLUTION INTRAVENOUS at 20:07

## 2018-08-07 RX ADMIN — POTASSIUM CHLORIDE 40 MEQ: 750 CAPSULE, EXTENDED RELEASE ORAL at 18:27

## 2018-08-07 NOTE — PROGRESS NOTES
[facility] Medicine Services  PROGRESS NOTE    Patient Name: Demetria Paris  : 1955  MRN: [id number]    Date of Admission: 2018  Length of Stay: 1  Primary Care Physician: Abraham Sousa MD    Subjective   Subjective     CC:  abd pain    HPI:  Pt feeling better this am, was anxious earlier today but feels better after d/w Dr. Mart    Review of Systems    Gen- No fevers, chills  CV- No chest pain, palpitations  Resp- No cough, dyspnea  GI- No N/V/D, abd pain  Otherwise ROS is negative except as mentioned in the HPI.    Objective   Objective     Vital Signs:   Temp:  [97.9 °F (36.6 °C)-99.7 °F (37.6 °C)] 98.7 °F (37.1 °C)  Heart Rate:  [68-96] 81  Resp:  [16-18] 16  BP: (116-144)/(58-81) 116/58        Physical Exam:  Constitutional: No acute distress, awake, alert  HENT: NCAT, mucous membranes moist  Respiratory: Clear to auscultation bilaterally, respiratory effort normal   Cardiovascular: RRR, no murmurs, rubs, or gallops, palpable pedal pulses bilaterally  Gastrointestinal: Positive bowel sounds, soft, nontender, nondistended  Musculoskeletal: No bilateral ankle edema  Psychiatric: Appropriate affect, cooperative  Neurologic: Oriented x 3, strength symmetric in all extremities, Cranial Nerves grossly intact to confrontation, speech clear  Skin: No rashes    Results Reviewed:  I have personally reviewed current lab, radiology, and data and agree.      Results from last 7 days  Lab Units 18  0417 18  2222   WBC 10*3/mm3 10.53 13.07*   HEMOGLOBIN g/dL 12.4 13.4   HEMATOCRIT % 37.7 40.2   PLATELETS 10*3/mm3 252 276   INR   --  0.93       Results from last 7 days  Lab Units 18  0417 18  2222   SODIUM mmol/L 140 141   POTASSIUM mmol/L 3.5 3.5   CHLORIDE mmol/L 103 102   CO2 mmol/L 29.0 26.0   BUN mg/dL 18 16   CREATININE mg/dL 0.71 0.79   GLUCOSE mg/dL 115* 131*   CALCIUM mg/dL 9.1 9.9   ALT (SGPT) U/L 14 16   AST (SGOT) U/L 14 17     Estimated  Creatinine Clearance: 80.7 mL/min (by C-G formula based on SCr of 0.71 mg/dL).  No results found for: BNP    Microbiology Results Abnormal     Procedure Component Value - Date/Time    Blood Culture - Blood, [[id number]]  (Normal) Collected:  08/06/18 2221    Lab Status:  Preliminary result Specimen:  Blood from Arm, Left Updated:  08/07/18 1046     Blood Culture No growth at less than 24 hours    Blood Culture - Blood, [[id number]]  (Normal) Collected:  08/06/18 2221    Lab Status:  Preliminary result Specimen:  Blood from Arm, Right Updated:  08/07/18 1046     Blood Culture No growth at less than 24 hours          Imaging Results (last 24 hours)     ** No results found for the last 24 hours. **             I have reviewed the medications.    Assessment/Plan   Assessment / Plan     Hospital Problem List     * (Principal)Proctitis    Diverticulosis of large intestine without hemorrhage    Overview Addendum 8/6/2018 12:33 PM by Abraham Sousa MD     Descending and sigmoid on 2/18 colonoscopy         Rectal abscess    RLQ abdominal pain             Brief Hospital Course to date:  Demetria Paris is a 62 y.o. female with PMH of gastric bypass, diverticulosis and GERD presents with acute abd pain that started four days prior to admission, CT A/P ordered by her PCP showed severe proctitis with suspected 2.6cm intramural abscess, but could not rule out neoplasm. Admitted to our service.     Plan:  --continue ABX as per Dr. Zamorano.  Dr. Mart has seen and suspect this is likely abscess.  Likely d/c tomorrow on IV ABX and then follow up with repeat imaging in a few weeks to ensure resolution.     DVT Prophylaxis:  mechanical    CODE STATUS:   Code Status and Medical Interventions:   Ordered at: 08/06/18 2208     Level Of Support Discussed With:    Patient     Code Status:    CPR     Medical Interventions (Level of Support Prior to Arrest):    Full       Disposition: I expect the patient to be discharged tomorrow to home on  IV ABX       Electronically signed by Denise Corral MD, 08/07/18, 1:54 PM.

## 2018-08-07 NOTE — CONSULTS
"Patient Name: Demetria Paris  : 1955  MRN: [id number]  Primary Care Physician: Abraham Sousa MD             Chief Complaint: suprapubic Abd Pain and constipation, progressive over the last 4days     HPI:     Demetria Paris is a 62 y.o. female presented to PCP office today with four day hx of abd pain - Right and Left lower quad pain. \"Feels like a suprapubic pain. It really hurts when I have to use the bathroom or pass gas.\" Pain worse with movement. Endorses low grade temperature. States prior to the onset of abd pain, she was constipated for the previous few days. Last BM was 18.    No prior difficulties like this.  Pain is much worse, even worse than childbirth in with passage of gas or bowel movement.    2018 colonoscopy for personal history of polyps without evidence of disease by patient history.    CT scan done yesterday with intramural rectal abscess    History gastric bypass 10 years ago  Milk of magnesia Monday after having 3 Days without a Bowel Movement.       PMHx: gastric bypass ; hysterectomy ; Right total knee replacement 2017; chronic bilateral rotator cuff pain;  colonoscopy without polyps, next       Pt was due to play in a golf tournament tomorrow.      Review of Systems   Constitutional: Positive for activity change, appetite change, fatigue and fever.   HENT: Negative for trouble swallowing.    Eyes: Negative for visual disturbance.   Respiratory: Negative for cough and shortness of breath.    Gastrointestinal: Positive for abdominal pain and constipation. Negative for diarrhea, nausea and vomiting.   Genitourinary: Negative for difficulty urinating, dysuria and frequency.   Musculoskeletal: Negative for back pain and gait problem.   Neurological: Negative for weakness and headaches.   Psychiatric/Behavioral: Negative for confusion.            Otherwise 10-system ROS reviewed and is negative except as mentioned in the HPI.     Personal History "      Medical History        Past Medical History:   Diagnosis Date   • Chemical exposure       phenteramine   • Diverticulosis     • Fibroid, uterine     • Joint pain     • Migraine with aura     • Needle stick injury              Surgical History         Past Surgical History:   Procedure Laterality Date   • ABDOMINAL HYSTERECTOMY Bilateral      Removal Of Both Ovaries   • BREAST BIOPSY Left     • BREAST BIOPSY Right     • BREAST EXCISIONAL BIOPSY       • COLONOSCOPY   2018   • GASTRIC BYPASS       • INCISIONAL BREAST BIOPSY Left    • OOPHORECTOMY       • TOTAL KNEE ARTHROPLASTY Right     • TUBAL ABDOMINAL LIGATION               Family History: family history includes Breast cancer in her other; Breast cancer (age of onset: 50) in her paternal aunt; Diabetes in her maternal grandmother and other; Heart disease in her other and paternal grandfather; Stroke in her maternal grandfather and other.   Father  at age 41 from MI; mother  age 75 from lung cancer     Social History:  reports that she has quit smoking. She does not have any smokeless tobacco history on file. She reports that she does not use drugs.  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          Medications:  Prescriptions Prior to Admission           Prescriptions Prior to Admission   Medication Sig Dispense Refill Last Dose   • acetaminophen (TYLENOL) 650 MG 8 hr tablet Take 1,300 mg by mouth Every 12 (Twelve) Hours As Needed for Mild Pain .         • aspirin 81 MG EC tablet Take 81 mg by mouth Daily.         • bisoprolol-hydrochlorothiazide (ZIAC) 5-6.25 MG per tablet Take 1 tablet by mouth Daily. 90 tablet 3 Taking   • calcium carbonate (OS-HARPER) 600 MG tablet Take 600 mg by mouth Every Night.         • estradiol (CLIMARA) 0.1 MG/24HR patch 0.5 patches 1 (One) Time Per Week.     Taking   • ibuprofen (ADVIL,MOTRIN) 200 MG tablet Take 600 mg by mouth At Night As Needed for  Mild Pain .         • levothyroxine (SYNTHROID, LEVOTHROID) 175 MCG tablet TAKE ONE TABLET BY MOUTH ONCE DAILY 90 tablet 1 Taking   • losartan-hydrochlorothiazide (HYZAAR) 100-25 MG per tablet Take 1 tablet by mouth Daily. 90 tablet 3 Taking   • Multiple Vitamins-Minerals (MULTIVITAMIN ADULT PO) Take 1 tablet by mouth Daily.         • omeprazole (priLOSEC) 40 MG capsule Take 1 capsule by mouth Daily. 90 capsule 3 Taking   • pravastatin (PRAVACHOL) 40 MG tablet Take 1 tablet by mouth Daily. 90 tablet 3 Taking   • traMADol (ULTRAM) 50 MG tablet Take 1 tablet by mouth Every 6 (Six) Hours As Needed for Moderate Pain . 120 tablet 2 Taking   • diclofenac (FLECTOR) 1.3 % patch patch Apply 1 patch topically 2 (Two) Times a Day. 60 patch 2 Taking            No Known Allergies        Objective      Objective      Vital Signs:   Temp:  [98.4 °F (36.9 °C)-99.7 °F (37.6 °C)] 98.4 °F (36.9 °C)  Heart Rate:  [68] 68  Resp:  [16] 16  BP: (118-128)/(64-79) 118/77      Physical Exam   Constitutional: She is oriented to person, place, and time. She appears well-developed and well-nourished. No distress.   Well groomed, pleasant.  at bedside.    HENT:   Head: Normocephalic and atraumatic.   Eyes: Pupils are equal, round, and reactive to light. EOM are normal.   Neck: No JVD present. No tracheal deviation present.   Cardiovascular: Normal rate, regular rhythm, normal heart sounds and intact distal pulses.  Exam reveals no gallop and no friction rub.    Pulmonary/Chest: Effort normal. No respiratory distress.   Abdominal: Soft. She exhibits no distension. There is tenderness (mild with palpation).   The skin around the anal canal is intact without evidence of pathology  Digital exam without pain in the anal canal but with severe pain on palpation of a fullness near the anterior rectum which seems to be extra rectal, outside the rectum in nature.  This induced severe pain requiring sitting on the toilet for relief thereafter.  The  fullness felt smooth and be on the rectal lumen.    Musculoskeletal: Normal range of motion. She exhibits no edema.   Neurological: She is alert and oriented to person, place, and time.   Skin: Skin is warm and dry. She is not diaphoretic.   Psychiatric: She has a normal mood and affect. Her behavior is normal. Judgment and thought content normal.            Results Reviewed:  I have personally reviewed current lab, radiology, and data and agree.                Invalid input(s):  ALKPHOS, TROPONININT  CrCl cannot be calculated (Patient's most recent lab result is older than the maximum 30 days allowed.).  Brief Urine Lab Results  (Last result in the past 365 days)       Color   Clarity   Blood   Leuk Est   Nitrite   Protein   CREAT   Urine HCG         11/01/17 1102 Yellow Clear Negative Negative Negative Negative                    No results found for: BNP      Imaging Results (last 24 hours)      ** No results found for the last 24 hours. **            CT scan reviewed, then further reviewed with Dr. Jasbir Swift, the inflammation seems to be more in the distal sigmoid and the rectum with multifocal phlegmon/early abscess with maximum diameter less than 2 cm.  Significant inflammatory change.        Assessment/Plan      Assessment / Plan          Hospital Problem List      * (Principal)Proctitis     Diverticulosis of large intestine without hemorrhage     Overview Addendum 8/6/2018 12:33 PM by Abraham Sousa MD       Descending and sigmoid on 2/18 colonoscopy           Rectal abscess     RLQ abdominal pain          Assessment & Plan:  Abx: Zosyn  Clear liquid diet  Labs  EKG  UA  Pain mgmt/bowel regimen  EKG     Findings most suggestive of complications from diverticulitis with multifocal phlegmon and abscess, largest collection less than 2 cm in diameter, not amenable to percutaneous drainage on discussion with radiology.  Severe pain with rectal exam consistent with pelvic phlegmon arising from the distal  sigmoid.  Plans for IV antibiotic therapy, which appears more appropriate than oral antibiotic therapy given the complexity of infectious process.  Would anticipate prolonged course of antibiotic therapy needed if surgery can be avoided.    If responsive to antibiotic therapy, anticipate transitioning to outpatient management with ongoing antibiotics and interval physical exam.  Likely to consider interval elective resection based on symptoms and CT scan findings of abscess.    Would reserve immediate surgery for difficulties refractory to medical management such as perforation, peritonitis, and refractory abscess/inflammation.    Discussed with Dr Santiago who happened to be on the hospital floor and was willing to consult re- antibiotic management for complex infectious process.

## 2018-08-07 NOTE — PROGRESS NOTES
Clinical Nutrition   Reason For Visit: Identified at risk by screening criteria, MST score 2+, Unintentional weight loss    Patient Name: Demetria Paris  YOB: 1955  MRN: [id number]  Date of Encounter: 08/07/18 3:56 PM  Admission date: 8/6/2018        Nutrition Assessment     Hospital Problem List  Principal Problem:    Proctitis  Active Problems:    Diverticulosis of large intestine without hemorrhage    Rectal abscess    RLQ abdominal pain      PMH: She  has a past medical history of Chemical exposure; Diverticulosis; Fibroid, uterine; Joint pain; Migraine with aura; and Needle stick injury.   PSxH: She  has a past surgical history that includes Tubal ligation (1980); Incisional breast biopsy (Left, 1998); Abdominal hysterectomy (Bilateral, 2007); Oophorectomy; Breast excisional biopsy; Colonoscopy (02/14/2018); Breast biopsy (Left); Breast biopsy (Right); Total knee arthroplasty (Right); and Gastric bypass.       Other Applicable Diagnosis:  Hx of gastric bypass (10 years ago)       Reported/Observed/Food/Nutrition Related History   Patient reports she normally eats small portions at meals/snacks due to her gastric bypass surgery, but she has been eating 50% of that usual PO intake x 1 week due to her abdominal pain. As a result has had unintentional weight loss of ~5 lbs during that timeframe. Willing to try Boost Breeze supplement with meals.      Anthropometrics   Height: 65 in  Weight: 158 lbs (standing wt 8/7)  BMI: 26.4  BMI classification: Overweight: 25.0-29.9kg/m2    UBW: 164 lbs (per pt; verified by EMR MD office visit 2/21 164 lbs)    Weight change: unintentional weight loss of 6 lbs (3.6%) x 1 week       Labs reviewed   Labs reviewed: Yes    Medications reviewed   Medications reviewed: Yes  Pertinent: IVF    Current Nutrition Prescription   PO: Diet Clear Liquid    Evaluation of Received Nutrient/Fluid Intake: 50% / 1 meal today (insufficient data)      Nutrition Diagnosis     Problem  Malnutrition (moderate, acute)   Etiology Abdominal pain   Signs/Symptoms Report of 50% of usual PO intake x 1 week; unintentional wt loss of 6 lbs (3.6%) x 1 week         Intervention   Intervention: Follow treatment progress, Care plan reviewed, Interview for preferences, Encourage intake     Ordered Boost Breeze with all meals      Goal:   General: Nutrition support treatment  PO: Increase intake, Advace diet as medically appropriate      Monitoring/Evaluation:       Monitoring/Evaluation: Per protocol, PO intake, Supplement intake  Will Continue to follow per protocol  Peyton Ahmadi RD  Time Spent: 30 min

## 2018-08-07 NOTE — H&P
"    [facility] Medicine Services  HISTORY AND PHYSICAL    Patient Name: Demetria Paris  : 1955  MRN: [id number]  Primary Care Physician: Abraham Sousa MD    Subjective   Subjective     Chief Complaint: Abd Pain    HPI:    Demetria Paris is a 62 y.o. female presented to PCP office today with four day hx of abd pain - Right and Left lower quad pain. \"Feels like a suprapubic pain. It really hurts when I have to use the bathroom or pass gas.\" Pain worse with movement. Endorses low grade temperature. States prior to the onset of abd pain, she was constipated for the previous few days. Last BM was 18.    PMHx: gastric bypass ; hysterectomy ; Right total knee replacement 2017; chronic bilateral rotator cuff pain;  colonoscopy without polyps, next      Pt was due to play in a golf tournament tomorrow.     Review of Systems   Constitutional: Positive for activity change, appetite change, fatigue and fever.   HENT: Negative for trouble swallowing.    Eyes: Negative for visual disturbance.   Respiratory: Negative for cough and shortness of breath.    Gastrointestinal: Positive for abdominal pain and constipation. Negative for diarrhea, nausea and vomiting.   Genitourinary: Negative for difficulty urinating, dysuria and frequency.   Musculoskeletal: Negative for back pain and gait problem.   Neurological: Negative for weakness and headaches.   Psychiatric/Behavioral: Negative for confusion.          Otherwise 10-system ROS reviewed and is negative except as mentioned in the HPI.    Personal History     Past Medical History:   Diagnosis Date   • Chemical exposure     phenteramine   • Diverticulosis    • Fibroid, uterine    • Joint pain    • Migraine with aura    • Needle stick injury        Past Surgical History:   Procedure Laterality Date   • ABDOMINAL HYSTERECTOMY Bilateral     Removal Of Both Ovaries   • BREAST BIOPSY Left    • BREAST BIOPSY Right    • BREAST " EXCISIONAL BIOPSY     • COLONOSCOPY  2018   • GASTRIC BYPASS     • INCISIONAL BREAST BIOPSY Left    • OOPHORECTOMY     • TOTAL KNEE ARTHROPLASTY Right    • TUBAL ABDOMINAL LIGATION         Family History: family history includes Breast cancer in her other; Breast cancer (age of onset: 50) in her paternal aunt; Diabetes in her maternal grandmother and other; Heart disease in her other and paternal grandfather; Stroke in her maternal grandfather and other.   Father  at age 41 from MI; mother  age 75 from lung cancer    Social History:  reports that she has quit smoking. She does not have any smokeless tobacco history on file. She reports that she does not use drugs.  Social History     Social History Narrative    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        Medications:  Prescriptions Prior to Admission   Medication Sig Dispense Refill Last Dose   • acetaminophen (TYLENOL) 650 MG 8 hr tablet Take 1,300 mg by mouth Every 12 (Twelve) Hours As Needed for Mild Pain .      • aspirin 81 MG EC tablet Take 81 mg by mouth Daily.      • bisoprolol-hydrochlorothiazide (ZIAC) 5-6.25 MG per tablet Take 1 tablet by mouth Daily. 90 tablet 3 Taking   • calcium carbonate (OS-HARPER) 600 MG tablet Take 600 mg by mouth Every Night.      • estradiol (CLIMARA) 0.1 MG/24HR patch 0.5 patches 1 (One) Time Per Week.   Taking   • ibuprofen (ADVIL,MOTRIN) 200 MG tablet Take 600 mg by mouth At Night As Needed for Mild Pain .      • levothyroxine (SYNTHROID, LEVOTHROID) 175 MCG tablet TAKE ONE TABLET BY MOUTH ONCE DAILY 90 tablet 1 Taking   • losartan-hydrochlorothiazide (HYZAAR) 100-25 MG per tablet Take 1 tablet by mouth Daily. 90 tablet 3 Taking   • Multiple Vitamins-Minerals (MULTIVITAMIN ADULT PO) Take 1 tablet by mouth Daily.      • omeprazole (priLOSEC) 40 MG capsule Take 1 capsule by mouth Daily. 90 capsule 3 Taking   • pravastatin (PRAVACHOL) 40 MG tablet Take 1 tablet by mouth Daily. 90 tablet 3 Taking   •  traMADol (ULTRAM) 50 MG tablet Take 1 tablet by mouth Every 6 (Six) Hours As Needed for Moderate Pain . 120 tablet 2 Taking   • diclofenac (FLECTOR) 1.3 % patch patch Apply 1 patch topically 2 (Two) Times a Day. 60 patch 2 Taking       No Known Allergies    Objective   Objective     Vital Signs:   Temp:  [98.4 °F (36.9 °C)-99.7 °F (37.6 °C)] 98.4 °F (36.9 °C)  Heart Rate:  [68] 68  Resp:  [16] 16  BP: (118-128)/(64-79) 118/77        Physical Exam   Constitutional: She is oriented to person, place, and time. She appears well-developed and well-nourished. No distress.   Well groomed, pleasant.  at bedside.    HENT:   Head: Normocephalic and atraumatic.   Eyes: Pupils are equal, round, and reactive to light. EOM are normal.   Neck: No JVD present. No tracheal deviation present.   Cardiovascular: Normal rate, regular rhythm, normal heart sounds and intact distal pulses.  Exam reveals no gallop and no friction rub.    Pulmonary/Chest: Effort normal. No respiratory distress.   Abdominal: Soft. She exhibits no distension. There is tenderness (mild with palpation).   Musculoskeletal: Normal range of motion. She exhibits no edema.   Neurological: She is alert and oriented to person, place, and time.   Skin: Skin is warm and dry. She is not diaphoretic.   Psychiatric: She has a normal mood and affect. Her behavior is normal. Judgment and thought content normal.          Results Reviewed:  I have personally reviewed current lab, radiology, and data and agree.              Invalid input(s):  ALKPHOS, TROPONININT  CrCl cannot be calculated (Patient's most recent lab result is older than the maximum 30 days allowed.).  Brief Urine Lab Results  (Last result in the past 365 days)      Color   Clarity   Blood   Leuk Est   Nitrite   Protein   CREAT   Urine HCG        11/01/17 1102 Yellow Clear Negative Negative Negative Negative             No results found for: BNP  Imaging Results (last 24 hours)     ** No results found for  "the last 24 hours. **             Assessment/Plan   Assessment / Plan     Hospital Problem List     * (Principal)Proctitis    Diverticulosis of large intestine without hemorrhage    Overview Addendum 8/6/2018 12:33 PM by Abraham Sousa MD     Descending and sigmoid on 2/18 colonoscopy         Rectal abscess    RLQ abdominal pain        Assessment & Plan:  Abx: Zosyn  NPO  Consult colorectal surgery  Labs  EKG  UA  Pain mgmt/bowel regimen  EKG    DVT prophylaxis: chad/scds    CODE STATUS:  Pt does not have an Advanced Directive.  is [facility] [place] [phone]  Code Status and Medical Interventions:   Ordered at: 08/06/18 2207     Level Of Support Discussed With:    Patient     Code Status:    CPR     Medical Interventions (Level of Support Prior to Arrest):    Full       Admission Status:  I believe this patient meets INPATIENT status due to the need for care which can only be reasonably provided in an hospital setting such as aggressive/expedited ancillary services and/or consultation services, the necessity for IV medications, close physician monitoring and/or the possible need for procedures.  In such, I feel patient’s risk for adverse outcomes and need for care warrant INPATIENT evaluation and predict the patient’s care encounter to likely last beyond 2 midnights.    Electronically signed by DELFINO Mcmahon, 08/06/18, 10:26 PM.        Brief Attending Admission Attestation     I have seen and examined the patient, performing an independent face-to-face diagnostic evaluation with plan of care reviewed and developed with the advanced practice clinician (APC).      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 The pain is mostly suprapubic and in the right lower quadrant and radiates into the left. Took MOM on Thursday for constipation. Had \"Fairy normal BM\" one day later. " "She reports decreased appetite and decreased PO intake. She reprots her pain has been 3/10, but when has BM of flatus her pain is 10/10. Denies nausea.  She reports having a low grade temp and chills.  Pt saw PCP as outpt today and CT of abd and pelvis was ordered. CT ordered by Dr Sousa noted a   Severe proctitis with a suspected 2.6 cm intramural abscess and also commented that findings  \"raise concern for a rectal neoplasm.\"  Pt PCP spoke with Dr Mart with Colorectal surgery who recommended admission and starting pt on IV Zosyn. Pt aware of CT findings.       Attending Physical Exam:  Gen-NAD  HEENT-atraumatic, normocephalic, PERRLA, EOMI, moist mucous membranes   CV-RRR, No Murmur  Resp-CTAB, no rales, no rhonchi, no wheezing  Abd-normal BS, soft, ND, tender in suprapubic region.   Ext-no edema or clubbing, pedal pulses intact  Skin-warm, dry, no rashes or lesions noted  Neuro-A&Ox3, CN II-XII grossly intact, equal strength in upper and lower extremities  Psych-appropriate mood and behavior          Brief Assessment/Plan :  See above for further detailed assessment and plan developed with APC which I have reviewed and/or edited.      Electronically signed by Margi Sawyer DO, 08/06/18, 11:53 PM.         "

## 2018-08-07 NOTE — PROGRESS NOTES
Discharge Planning Assessment  [facility]     Patient Name: Demetria Paris  MRN: [id number]  Today's Date: 8/7/2018    Admit Date: 8/6/2018          Discharge Needs Assessment     Row Name 08/07/18 1052       Living Environment    Lives With spouse    Current Living Arrangements home/apartment/condo    Primary Care Provided by self       Discharge Needs Assessment    Concerns to be Addressed no discharge needs identified    Equipment Currently Used at Home none            Discharge Plan     Row Name 08/07/18 1053       Plan    Plan Home with .    Plan Comments Spoke with patient who states she lives with her  in [facility]  Reports she is independent with all activities of daily living and has no home health or DME for use at home.  States Dr. Freeamn Heni is her PCP and that she has insurance coverage for her prescription medications.  Mrs. Paris states her  will provide transport home.  Case Management will follow and assist with any discharge planning needs.        Destination     No service coordination in this encounter.      Durable Medical Equipment     No service coordination in this encounter.      Dialysis/Infusion     No service coordination in this encounter.      Home Medical Care     No service coordination in this encounter.      Social Care     No service coordination in this encounter.                Demographic Summary     Row Name 08/07/18 1051       General Information    Referral Source admission list    Preferred Language English            Functional Status     Row Name 08/07/18 1051       Functional Status    Usual Activity Tolerance good    Current Activity Tolerance good       Functional Status, IADL    Medications independent    Meal Preparation independent    Housekeeping independent    Laundry independent    Shopping independent            Psychosocial    No documentation.           Abuse/Neglect    No documentation.           Legal    No documentation.            Substance Abuse    No documentation.           Patient Forms    No documentation.         Nata Marrufo, RN

## 2018-08-07 NOTE — PLAN OF CARE
Problem: Patient Care Overview  Goal: Plan of Care Review  Outcome: Ongoing (interventions implemented as appropriate)   08/07/18 0408   Coping/Psychosocial   Plan of Care Reviewed With patient   Coping/Psychosocial   Patient Agreement with Plan of Care agrees   Plan of Care Review   Progress no change   OTHER   Outcome Summary Pt admitted with lower abd / suprapubic pain. Rates pain a 3 but refuses meds. CRS consult in a.m. Potassium replacement in progress. VSS stable. Adequate UOP.        Problem: Infection, Risk/Actual (Adult)  Goal: Identify Related Risk Factors and Signs and Symptoms  Outcome: Ongoing (interventions implemented as appropriate)   08/07/18 0408   Infection, Risk/Actual (Adult)   Related Risk Factors (Infection, Risk/Actual) other (see comments)  (Abd pain)   Signs and Symptoms (Infection, Risk/Actual) lab value changes     Goal: Infection Prevention/Resolution  Outcome: Ongoing (interventions implemented as appropriate)   08/07/18 0408   Infection, Risk/Actual (Adult)   Infection Prevention/Resolution making progress toward outcome       Problem: Fall Risk (Adult)  Goal: Identify Related Risk Factors and Signs and Symptoms  Outcome: Ongoing (interventions implemented as appropriate)   08/07/18 0408   Fall Risk (Adult)   Related Risk Factors (Fall Risk) environment unfamiliar   Signs and Symptoms (Fall Risk) presence of risk factors     Goal: Absence of Fall  Outcome: Ongoing (interventions implemented as appropriate)   08/07/18 0408   Fall Risk (Adult)   Absence of Fall making progress toward outcome

## 2018-08-07 NOTE — PAYOR COMM NOTE
"Alana Cosby RN Utilization Review [phone]  Fax # [phone]  Ref # [id number]          Demetria Paris (62 y.o. Female)     Date of Birth Social Security Number Address Home Phone MRN    1955  [address] [place] [address] [phone] [id number]    Jew Marital Status          Other        Admission Date Admission Type Admitting Provider Attending Provider Department, Room/Bed    18 Elective Denise Corral MD Howard, Gabriela Kirk, MD [facility] 5H, S580/1    Discharge Date Discharge Disposition Discharge Destination                       Attending Provider:  Denise Corral MD    Allergies:  No Known Allergies    Isolation:  None   Infection:  None   Code Status:  CPR    Ht:  165.1 cm (65\")   Wt:  72 kg (158 lb 12.8 oz)    Admission Cmt:  None   Principal Problem:  Proctitis [K62.89]                 Active Insurance as of 2018     Primary Coverage     Payor Plan Insurance Group Employer/Plan Group    [facility] [place] [place] [place] [id number]     Payor Plan Address Payor Plan Phone Number Effective From Effective To    [address]  2016     [facility] [address]       Subscriber Name Subscriber Birth Date Member ID       DEMETRIA PARIS 1955 [id number]                 Emergency Contacts      (Rel.) Home Phone Work Phone Mobile Phone    Hardy Paris (Spouse) -- -- [phone]               History & Physical      Margi Sawyer DO at 2018 10:26 PM              [facility] Medicine Services  HISTORY AND PHYSICAL    Patient Name: Demetria Paris  : 1955  MRN: [id number]  Primary Care Physician: Abraham Sousa MD    Subjective   Subjective     Chief Complaint: Abd Pain    HPI:    Demetria Paris is a 62 y.o. female presented to PCP office today with four day hx of abd pain - Right and Left lower quad pain. \"Feels like a suprapubic pain. It really hurts when I have to use the bathroom or pass " "gas.\" Pain worse with movement. Endorses low grade temperature. States prior to the onset of abd pain, she was constipated for the previous few days. Last BM was 8/5/18.    PMHx: gastric bypass 2010; hysterectomy 2008; Right total knee replacement 11/2017; chronic bilateral rotator cuff pain; 2/18 colonoscopy without polyps, next 2028     Pt was due to play in a golf tournament tomorrow.     Review of Systems   Constitutional: Positive for activity change, appetite change, fatigue and fever.   HENT: Negative for trouble swallowing.    Eyes: Negative for visual disturbance.   Respiratory: Negative for cough and shortness of breath.    Gastrointestinal: Positive for abdominal pain and constipation. Negative for diarrhea, nausea and vomiting.   Genitourinary: Negative for difficulty urinating, dysuria and frequency.   Musculoskeletal: Negative for back pain and gait problem.   Neurological: Negative for weakness and headaches.   Psychiatric/Behavioral: Negative for confusion.          Otherwise 10-system ROS reviewed and is negative except as mentioned in the HPI.    Personal History     Past Medical History:   Diagnosis Date   • Chemical exposure     phenteramine   • Diverticulosis    • Fibroid, uterine    • Joint pain    • Migraine with aura    • Needle stick injury        Past Surgical History:   Procedure Laterality Date   • ABDOMINAL HYSTERECTOMY Bilateral 2007    Removal Of Both Ovaries   • BREAST BIOPSY Left    • BREAST BIOPSY Right    • BREAST EXCISIONAL BIOPSY     • COLONOSCOPY  02/14/2018   • GASTRIC BYPASS     • INCISIONAL BREAST BIOPSY Left 1998   • OOPHORECTOMY     • TOTAL KNEE ARTHROPLASTY Right    • TUBAL ABDOMINAL LIGATION  1980       Family History: family history includes Breast cancer in her other; Breast cancer (age of onset: 50) in her paternal aunt; Diabetes in her maternal grandmother and other; Heart disease in her other and paternal grandfather; Stroke in her maternal grandfather and other. "   Father  at age 41 from MI; mother  age 75 from lung cancer    Social History:  reports that she has quit smoking. She does not have any smokeless tobacco history on file. She reports that she does not use drugs.  Social History     Social History Narrative    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        Medications:  Prescriptions Prior to Admission   Medication Sig Dispense Refill Last Dose   • acetaminophen (TYLENOL) 650 MG 8 hr tablet Take 1,300 mg by mouth Every 12 (Twelve) Hours As Needed for Mild Pain .      • aspirin 81 MG EC tablet Take 81 mg by mouth Daily.      • bisoprolol-hydrochlorothiazide (ZIAC) 5-6.25 MG per tablet Take 1 tablet by mouth Daily. 90 tablet 3 Taking   • calcium carbonate (OS-HARPER) 600 MG tablet Take 600 mg by mouth Every Night.      • estradiol (CLIMARA) 0.1 MG/24HR patch 0.5 patches 1 (One) Time Per Week.   Taking   • ibuprofen (ADVIL,MOTRIN) 200 MG tablet Take 600 mg by mouth At Night As Needed for Mild Pain .      • levothyroxine (SYNTHROID, LEVOTHROID) 175 MCG tablet TAKE ONE TABLET BY MOUTH ONCE DAILY 90 tablet 1 Taking   • losartan-hydrochlorothiazide (HYZAAR) 100-25 MG per tablet Take 1 tablet by mouth Daily. 90 tablet 3 Taking   • Multiple Vitamins-Minerals (MULTIVITAMIN ADULT PO) Take 1 tablet by mouth Daily.      • omeprazole (priLOSEC) 40 MG capsule Take 1 capsule by mouth Daily. 90 capsule 3 Taking   • pravastatin (PRAVACHOL) 40 MG tablet Take 1 tablet by mouth Daily. 90 tablet 3 Taking   • traMADol (ULTRAM) 50 MG tablet Take 1 tablet by mouth Every 6 (Six) Hours As Needed for Moderate Pain . 120 tablet 2 Taking   • diclofenac (FLECTOR) 1.3 % patch patch Apply 1 patch topically 2 (Two) Times a Day. 60 patch 2 Taking       No Known Allergies    Objective   Objective     Vital Signs:   Temp:  [98.4 °F (36.9 °C)-99.7 °F (37.6 °C)] 98.4 °F (36.9 °C)  Heart Rate:  [68] 68  Resp:  [16] 16  BP: (118-128)/(64-79) 118/77        Physical Exam   Constitutional: She is  oriented to person, place, and time. She appears well-developed and well-nourished. No distress.   Well groomed, pleasant.  at bedside.    HENT:   Head: Normocephalic and atraumatic.   Eyes: Pupils are equal, round, and reactive to light. EOM are normal.   Neck: No JVD present. No tracheal deviation present.   Cardiovascular: Normal rate, regular rhythm, normal heart sounds and intact distal pulses.  Exam reveals no gallop and no friction rub.    Pulmonary/Chest: Effort normal. No respiratory distress.   Abdominal: Soft. She exhibits no distension. There is tenderness (mild with palpation).   Musculoskeletal: Normal range of motion. She exhibits no edema.   Neurological: She is alert and oriented to person, place, and time.   Skin: Skin is warm and dry. She is not diaphoretic.   Psychiatric: She has a normal mood and affect. Her behavior is normal. Judgment and thought content normal.          Results Reviewed:  I have personally reviewed current lab, radiology, and data and agree.              Invalid input(s):  ALKPHOS, TROPONININT  CrCl cannot be calculated (Patient's most recent lab result is older than the maximum 30 days allowed.).  Brief Urine Lab Results  (Last result in the past 365 days)      Color   Clarity   Blood   Leuk Est   Nitrite   Protein   CREAT   Urine HCG        11/01/17 1102 Yellow Clear Negative Negative Negative Negative             No results found for: BNP  Imaging Results (last 24 hours)     ** No results found for the last 24 hours. **             Assessment/Plan   Assessment / Plan     Hospital Problem List     * (Principal)Proctitis    Diverticulosis of large intestine without hemorrhage    Overview Addendum 8/6/2018 12:33 PM by Abraham Sousa MD     Descending and sigmoid on 2/18 colonoscopy         Rectal abscess    RLQ abdominal pain        Assessment & Plan:  Abx: Zosyn  NPO  Consult colorectal surgery  Labs  EKG  UA  Pain mgmt/bowel regimen  EKG    DVT prophylaxis:  "chad/scds    CODE STATUS:  Pt does not have an Advanced Directive.  is TAYLOR Paris [phone]  Code Status and Medical Interventions:   Ordered at: 08/06/18 2208     Level Of Support Discussed With:    Patient     Code Status:    CPR     Medical Interventions (Level of Support Prior to Arrest):    Full       Admission Status:  I believe this patient meets INPATIENT status due to the need for care which can only be reasonably provided in an hospital setting such as aggressive/expedited ancillary services and/or consultation services, the necessity for IV medications, close physician monitoring and/or the possible need for procedures.  In such, I feel patient’s risk for adverse outcomes and need for care warrant INPATIENT evaluation and predict the patient’s care encounter to likely last beyond 2 midnights.    Electronically signed by DELFINO Mcmahon, 08/06/18, 10:26 PM.        Brief Attending Admission Attestation     I have seen and examined the patient, performing an independent face-to-face diagnostic evaluation with plan of care reviewed and developed with the advanced practice clinician (APC).      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 The pain is mostly suprapubic and in the right lower quadrant and radiates into the left. Took MOM on Thursday for constipation. Had \"Fairy normal BM\" one day later. She reports decreased appetite and decreased PO intake. She reprots her pain has been 3/10, but when has BM of flatus her pain is 10/10. Denies nausea.  She reports having a low grade temp and chills.  Pt saw PCP as outpt today and CT of abd and pelvis was ordered. CT ordered by Dr Sousa noted a   Severe proctitis with a suspected 2.6 cm intramural abscess and also commented that findings  \" raise concern for a rectal neoplasm.\"  Pt PCP spoke with Dr Mart with " "Colorectal surgery who recommended admission and starting pt on IV Zosyn. Pt aware of CT findings.       Attending Physical Exam:  Gen-NAD  HEENT-atraumatic, normocephalic, PERRLA, EOMI, moist mucous membranes   CV-RRR, No Murmur  Resp-CTAB, no rales, no rhonchi, no wheezing  Abd-normal BS, soft, ND, tender in suprapubic region.   Ext-no edema or clubbing, pedal pulses intact  Skin-warm, dry, no rashes or lesions noted  Neuro-A&Ox3, CN II-XII grossly intact, equal strength in upper and lower extremities  Psych-appropriate mood and behavior          Brief Assessment/Plan :  See above for further detailed assessment and plan developed with APC which I have reviewed and/or edited.      Electronically signed by Margi Sawyer DO, 08/06/18, 11:53 PM.           Electronically signed by Margi Sawyer DO at 8/7/2018  1:57 AM       Emergency Department Notes     No notes of this type exist for this encounter.              ICU Vital Signs     Row Name 08/07/18 1137 08/07/18 0827 08/07/18 0600 08/07/18 0430 08/07/18 0400       Height and Weight    Height  --  --  -- 165.1 cm (65\")  --    Height Method  --  --  -- Stated  --    Weight  --  --  -- 72 kg (158 lb 12.8 oz)  --    Weight Method  --  --  -- Standing scale  --    Ideal Body Weight (IBW) (kg)  --  --  -- 57.29  --    BSA (Calculated - sq m)  --  --  -- 1.79 sq meters  --    BMI (Calculated)  --  --  -- 26.4  --    Weight in (lb) to have BMI = 25  --  --  -- 149.9  --       Vitals    Temp 98.7 °F (37.1 °C) 97.9 °F (36.6 °C)  -- 98.7 °F (37.1 °C)  --    Temp src Oral Oral  -- Oral  --    Pulse 81 84  -- 81  --    Heart Rate Source Monitor Monitor  -- Monitor  --    Resp 16 16  -- 18  --    Resp Rate Source Visual Visual  -- Visual  --    /58 125/70  -- 144/81  --    BP Location Left arm Left arm  -- Right arm  --    BP Method Automatic Automatic  -- Automatic  --    Patient Position Lying Lying  -- Sitting  --       Oxygen Therapy    SpO2  --  -- 95 %  -- 96 %    " "Device (Oxygen Therapy)  --  -- room air  -- room air    Row Name 08/07/18 0200 08/07/18 0000 08/06/18 2200 08/06/18 2150 08/06/18 2146       Vitals    Temp  --  --  -- 98.4 °F (36.9 °C)  --    Temp src  --  --  -- Oral  --    Pulse  --  --  -- 96  --    Heart Rate Source  --  --  -- Monitor  --    Resp  --  --  -- 16  --    Resp Rate Source  --  --  -- Visual  --    BP  --  --  -- 118/77 128/79    BP Location  --  --  -- Right arm Left arm    BP Method  --  --  -- Automatic Automatic    Patient Position  --  --  -- Sitting Sitting       Oxygen Therapy    SpO2 94 % 99 % 96 % 98 %  --    Device (Oxygen Therapy) room air room air room air  --  --        Hospital Medications (active)       Dose Frequency Start End    HYDROmorphone (DILAUDID) injection 0.25 mg 0.25 mg Every 2 Hours PRN 8/6/2018 8/16/2018    Sig - Route: Infuse 0.25 mL into a venous catheter Every 2 (Two) Hours As Needed for Severe Pain . - Intravenous    iopamidol (ISOVUE-300) 61 % injection 100 mL 100 mL Once in Imaging 8/6/2018 8/6/2018    Sig - Route: Infuse 100 mL into a venous catheter Once. - Intravenous    piperacillin-tazobactam (ZOSYN) 3.375 g in iso-osmotic dextrose 50 ml (premix) 3.375 g Once 8/6/2018 8/7/2018    Sig - Route: Infuse 50 mL into a venous catheter 1 (One) Time. - Intravenous    piperacillin-tazobactam (ZOSYN) 3.375 g in iso-osmotic dextrose 50 ml (premix) 3.375 g Every 8 Hours 8/7/2018 8/11/2018    Sig - Route: Infuse 50 mL into a venous catheter Every 8 (Eight) Hours. - Intravenous    polyethylene glycol 3350 powder (packet) 17 g 2 Times Daily 8/7/2018     Sig - Route: Take 17 g by mouth 2 (Two) Times a Day. - Oral    potassium chloride (KLOR-CON) packet 40 mEq 40 mEq As Needed 8/7/2018     Sig - Route: Take 40 mEq by mouth As Needed (potassium replacement, see admin instructions). - Oral    Linked Group 1:  \"Or\" Linked Group Details        potassium chloride (MICRO-K) CR capsule 40 mEq 40 mEq As Needed 8/7/2018     Sig - " "Route: Take 4 capsules by mouth As Needed (potassium replacement.  see admin instructions). - Oral    Linked Group 1:  \"Or\" Linked Group Details        potassium chloride 10 mEq in 100 mL IVPB 10 mEq Every 1 Hour PRN 8/7/2018     Sig - Route: Infuse 100 mL into a venous catheter Every 1 (One) Hour As Needed (potassium protocol PERIPHERAL - see admin instructions). - Intravenous    Linked Group 1:  \"Or\" Linked Group Details        sodium chloride 0.9 % flush 1-10 mL 1-10 mL As Needed 8/6/2018     Sig - Route: Infuse 1-10 mL into a venous catheter As Needed for Line Care. - Intravenous    sodium chloride 0.9 % infusion 100 mL/hr Continuous 8/6/2018     Sig - Route: Infuse 100 mL/hr into a venous catheter Continuous. - Intravenous            Lab Results (last 24 hours)     Procedure Component Value Units Date/Time    Blood Culture - Blood, [[id number]]  (Normal) Collected:  08/06/18 2221    Specimen:  Blood from Arm, Left Updated:  08/07/18 1046     Blood Culture No growth at less than 24 hours    Blood Culture - Blood, [[id number]]  (Normal) Collected:  08/06/18 2221    Specimen:  Blood from Arm, Right Updated:  08/07/18 1046     Blood Culture No growth at less than 24 hours    Hemoglobin A1c [[id number]]  (Abnormal) Collected:  08/07/18 0417    Specimen:  Blood Updated:  08/07/18 0654     Hemoglobin A1C 6.00 (H) %     Narrative:       The American Diabetes Association recommends maintenance of Hemoglobin A1C at 7.0% or lower. Goals for Hemoglobin A1C reduction may need to be modified if hypoglycemia is a problem.    Comprehensive Metabolic Panel [[id number]]  (Abnormal) Collected:  08/07/18 0417    Specimen:  Blood Updated:  08/07/18 0533     Glucose 115 (H) mg/dL      BUN 18 mg/dL      Creatinine 0.71 mg/dL      Sodium 140 mmol/L      Potassium 3.5 mmol/L      Chloride 103 mmol/L      CO2 29.0 mmol/L      Calcium 9.1 mg/dL      Total Protein 6.8 g/dL      Albumin 4.01 g/dL      ALT (SGPT) 14 U/L      AST (SGOT) 14 U/L  "     Alkaline Phosphatase 79 U/L      Total Bilirubin 0.6 mg/dL      eGFR Non African Amer 83 mL/min/1.73      Globulin 2.8 gm/dL      A/G Ratio 1.4 (L) g/dL      BUN/Creatinine Ratio 25.4 (H)     Anion Gap 8.0 mmol/L     Narrative:       National Kidney Foundation Guidelines    Stage     Description        GFR  1         Normal or High     90+  2         Mild decrease      60-89  3         Moderate decrease  30-59  4         Severe decrease    15-29  5         Kidney failure     <15    CBC Auto Differential [[id number]]  (Abnormal) Collected:  08/07/18 0417    Specimen:  Blood Updated:  08/07/18 0529     WBC 10.53 10*3/mm3      RBC 4.31 10*6/mm3      Hemoglobin 12.4 g/dL      Hematocrit 37.7 %      MCV 87.5 fL      MCH 28.8 pg      MCHC 32.9 g/dL      RDW 12.8 %      RDW-SD 41.2 fl      MPV 10.6 fL      Platelets 252 10*3/mm3      Neutrophil % 72.6 (H) %      Lymphocyte % 17.8 (L) %      Monocyte % 8.5 %      Eosinophil % 0.9 %      Basophil % 0.2 %      Immature Grans % 0.1 %      Neutrophils, Absolute 7.66 10*3/mm3      Lymphocytes, Absolute 1.87 10*3/mm3      Monocytes, Absolute 0.89 10*3/mm3      Eosinophils, Absolute 0.09 10*3/mm3      Basophils, Absolute 0.02 10*3/mm3      Immature Grans, Absolute 0.01 10*3/mm3      nRBC 0.0 /100 WBC     Urinalysis With Culture If Indicated - Urine, Clean Catch [[id number]]  (Abnormal) Collected:  08/06/18 2100    Specimen:  Urine from Urine, Clean Catch Updated:  08/07/18 0131     Color, UA Yellow     Appearance, UA Clear     pH, UA 6.5     Specific Gravity, UA 1.051 (H)     Glucose, UA Negative     Ketones, UA Trace (A)     Bilirubin, UA Negative     Blood, UA Negative     Protein, UA Negative     Leuk Esterase, UA Negative     Nitrite, UA Negative     Urobilinogen, UA 0.2 E.U./dL    Narrative:       Urine microscopic not indicated.    Protime-INR [[id number]]  (Normal) Collected:  08/06/18 2222    Specimen:  Blood Updated:  08/06/18 2313     Protime 9.8 Seconds      INR 0.93     aPTT [[id number]]  (Abnormal) Collected:  08/06/18 2222    Specimen:  Blood Updated:  08/06/18 2313     PTT 31.3 (H) seconds     Narrative:       PTT = The equivalent PTT values for the therapeutic range of heparin levels at 0.3 to 0.5 U/ml are 55 to 70 seconds.    Lactic Acid, Plasma [[id number]]  (Normal) Collected:  08/06/18 2222    Specimen:  Blood Updated:  08/06/18 2308     Lactate 1.0 mmol/L      Comment: Falsely depressed results may occur on samples drawn from patients receiving N-Acetylcysteine (NAC) or Metamizole.       Comprehensive Metabolic Panel [[id number]]  (Abnormal) Collected:  08/06/18 2222    Specimen:  Blood Updated:  08/06/18 2253     Glucose 131 (H) mg/dL      BUN 16 mg/dL      Creatinine 0.79 mg/dL      Sodium 141 mmol/L      Potassium 3.5 mmol/L      Chloride 102 mmol/L      CO2 26.0 mmol/L      Calcium 9.9 mg/dL      Total Protein 7.7 g/dL      Albumin 4.53 g/dL      ALT (SGPT) 16 U/L      AST (SGOT) 17 U/L      Alkaline Phosphatase 87 U/L      Total Bilirubin 0.6 mg/dL      eGFR Non African Amer 74 mL/min/1.73      Globulin 3.2 gm/dL      A/G Ratio 1.4 (L) g/dL      BUN/Creatinine Ratio 20.3     Anion Gap 13.0 (H) mmol/L     Narrative:       National Kidney Foundation Guidelines    Stage     Description        GFR  1         Normal or High     90+  2         Mild decrease      60-89  3         Moderate decrease  30-59  4         Severe decrease    15-29  5         Kidney failure     <15    Magnesium [[id number]]  (Normal) Collected:  08/06/18 2222    Specimen:  Blood Updated:  08/06/18 2253     Magnesium 2.2 mg/dL     CBC & Differential [[id number]] Collected:  08/06/18 2222    Specimen:  Blood Updated:  08/06/18 2241    Narrative:       The following orders were created for panel order CBC & Differential.  Procedure                               Abnormality         Status                     ---------                               -----------         ------                     CBC Auto  Differential[[id number]]        Abnormal            Final result                 Please view results for these tests on the individual orders.    CBC Auto Differential [[id number]]  (Abnormal) Collected:  182    Specimen:  Blood Updated:  18     WBC 13.07 (H) 10*3/mm3      RBC 4.55 10*6/mm3      Hemoglobin 13.4 g/dL      Hematocrit 40.2 %      MCV 88.4 fL      MCH 29.5 pg      MCHC 33.3 g/dL      RDW 12.8 %      RDW-SD 41.0 fl      MPV 10.7 fL      Platelets 276 10*3/mm3      Neutrophil % 78.9 (H) %      Lymphocyte % 12.5 (L) %      Monocyte % 7.8 %      Eosinophil % 0.5 %      Basophil % 0.3 %      Immature Grans % 0.2 %      Neutrophils, Absolute 10.30 (H) 10*3/mm3      Lymphocytes, Absolute 1.64 10*3/mm3      Monocytes, Absolute 1.02 (H) 10*3/mm3      Eosinophils, Absolute 0.07 10*3/mm3      Basophils, Absolute 0.04 10*3/mm3      Immature Grans, Absolute 0.02 10*3/mm3         Operative/Procedure Notes (all)     No notes of this type exist for this encounter.           Physician Progress Notes (all)      Denise Corral MD at 2018  9:54 AM              [facility] Medicine Services  PROGRESS NOTE    Patient Name: Demetria Paris  : 1955  MRN: [id number]    Date of Admission: 2018  Length of Stay: 1  Primary Care Physician: Abraham Sousa MD    Subjective   Subjective     CC:  abd pain    HPI:  Pt feeling better this am, was anxious earlier today but feels better after d/w Dr. Mart    Review of Systems    Gen- No fevers, chills  CV- No chest pain, palpitations  Resp- No cough, dyspnea  GI- No N/V/D, abd pain  Otherwise ROS is negative except as mentioned in the HPI.    Objective   Objective     Vital Signs:   Temp:  [97.9 °F (36.6 °C)-99.7 °F (37.6 °C)] 98.7 °F (37.1 °C)  Heart Rate:  [68-96] 81  Resp:  [16-18] 16  BP: (116-144)/(58-81) 116/58        Physical Exam:  Constitutional: No acute distress, awake, alert  HENT: NCAT, mucous membranes  moist  Respiratory: Clear to auscultation bilaterally, respiratory effort normal   Cardiovascular: RRR, no murmurs, rubs, or gallops, palpable pedal pulses bilaterally  Gastrointestinal: Positive bowel sounds, soft, nontender, nondistended  Musculoskeletal: No bilateral ankle edema  Psychiatric: Appropriate affect, cooperative  Neurologic: Oriented x 3, strength symmetric in all extremities, Cranial Nerves grossly intact to confrontation, speech clear  Skin: No rashes    Results Reviewed:  I have personally reviewed current lab, radiology, and data and agree.      Results from last 7 days  Lab Units 08/07/18  0417 08/06/18 2222   WBC 10*3/mm3 10.53 13.07*   HEMOGLOBIN g/dL 12.4 13.4   HEMATOCRIT % 37.7 40.2   PLATELETS 10*3/mm3 252 276   INR   --  0.93       Results from last 7 days  Lab Units 08/07/18 0417 08/06/18 2222   SODIUM mmol/L 140 141   POTASSIUM mmol/L 3.5 3.5   CHLORIDE mmol/L 103 102   CO2 mmol/L 29.0 26.0   BUN mg/dL 18 16   CREATININE mg/dL 0.71 0.79   GLUCOSE mg/dL 115* 131*   CALCIUM mg/dL 9.1 9.9   ALT (SGPT) U/L 14 16   AST (SGOT) U/L 14 17     Estimated Creatinine Clearance: 80.7 mL/min (by C-G formula based on SCr of 0.71 mg/dL).  No results found for: BNP    Microbiology Results Abnormal     Procedure Component Value - Date/Time    Blood Culture - Blood, [[id number]]  (Normal) Collected:  08/06/18 2221    Lab Status:  Preliminary result Specimen:  Blood from Arm, Left Updated:  08/07/18 1046     Blood Culture No growth at less than 24 hours    Blood Culture - Blood, [[id number]]  (Normal) Collected:  08/06/18 2221    Lab Status:  Preliminary result Specimen:  Blood from Arm, Right Updated:  08/07/18 1046     Blood Culture No growth at less than 24 hours          Imaging Results (last 24 hours)     ** No results found for the last 24 hours. **             I have reviewed the medications.    Assessment/Plan   Assessment / Plan     Hospital Problem List     * (Principal)Proctitis     "Diverticulosis of large intestine without hemorrhage    Overview Addendum 2018 12:33 PM by Abraham Sousa MD     Descending and sigmoid on  colonoscopy         Rectal abscess    RLQ abdominal pain             Brief Hospital Course to date:  Demetria Paris is a 62 y.o. female with PMH of gastric bypass, diverticulosis and GERD presents with acute abd pain that started four days prior to admission, CT A/P ordered by her PCP showed severe proctitis with suspected 2.6cm intramural abscess, but could not rule out neoplasm. Admitted to our service.     Plan:  --continue ABX as per Dr. Zamorano.  Dr. Mart has seen and suspect this is likely abscess.  Likely d/c tomorrow on IV ABX and then follow up with repeat imaging in a few weeks to ensure resolution.     DVT Prophylaxis:  mechanical    CODE STATUS:   Code Status and Medical Interventions:   Ordered at: 18     Level Of Support Discussed With:    Patient     Code Status:    CPR     Medical Interventions (Level of Support Prior to Arrest):    Full       Disposition: I expect the patient to be discharged tomorrow to home on IV ABX       Electronically signed by Denise Corral MD, 18, 1:54 PM.      Electronically signed by Denise Corral MD at 2018  1:59 PM          Consult Notes (all)      Jose Antonio Mart MD at 2018 11:22 AM      Consult Orders:    1. Inpatient Colorectal Surgery Consult [[id number]] ordered by Margi Sawyer DO at 18                Patient Name: Demetria Paris  : 1955  MRN: [id number]  Primary Care Physician: Abraham Sousa MD             Chief Complaint:  suprapubic Abd Pain and constipation, progressive over the last 4days     HPI:     Demetria Paris is a 62 y.o. female presented to PCP office today with four day hx of abd pain - Right and Left lower quad pain. \"Feels like a suprapubic pain. It really hurts when I have to use the bathroom or pass gas.\" Pain worse with movement. " Endorses low grade temperature. States prior to the onset of abd pain, she was constipated for the previous few days. Last BM was 8/5/18.    No prior difficulties like this.  Pain is much worse, even worse than childbirth in with passage of gas or bowel movement.    February 2018 colonoscopy for personal history of polyps without evidence of disease by patient history.    CT scan done yesterday with intramural rectal abscess    History gastric bypass 10 years ago  Milk of magnesia Monday after having 3 Days without a Bowel Movement.       PMHx: gastric bypass 2010; hysterectomy 2008; Right total knee replacement 11/2017; chronic bilateral rotator cuff pain; 2/18 colonoscopy without polyps, next 2028      Pt was due to play in a golf tournament tomorrow.      Review of Systems   Constitutional: Positive for activity change, appetite change, fatigue and fever.   HENT: Negative for trouble swallowing.    Eyes: Negative for visual disturbance.   Respiratory: Negative for cough and shortness of breath.    Gastrointestinal: Positive for abdominal pain and constipation. Negative for diarrhea, nausea and vomiting.   Genitourinary: Negative for difficulty urinating, dysuria and frequency.   Musculoskeletal: Negative for back pain and gait problem.   Neurological: Negative for weakness and headaches.   Psychiatric/Behavioral: Negative for confusion.            Otherwise 10-system ROS reviewed and is negative except as mentioned in the HPI.     Personal History      Medical History        Past Medical History:   Diagnosis Date   • Chemical exposure       phenteramine   • Diverticulosis     • Fibroid, uterine     • Joint pain     • Migraine with aura     • Needle stick injury              Surgical History         Past Surgical History:   Procedure Laterality Date   • ABDOMINAL HYSTERECTOMY Bilateral 2007     Removal Of Both Ovaries   • BREAST BIOPSY Left     • BREAST BIOPSY Right     • BREAST EXCISIONAL BIOPSY       •  COLONOSCOPY   2018   • GASTRIC BYPASS       • INCISIONAL BREAST BIOPSY Left    • OOPHORECTOMY       • TOTAL KNEE ARTHROPLASTY Right     • TUBAL ABDOMINAL LIGATION               Family History: family history includes Breast cancer in her other; Breast cancer (age of onset: 50) in her paternal aunt; Diabetes in her maternal grandmother and other; Heart disease in her other and paternal grandfather; Stroke in her maternal grandfather and other.   Father  at age 41 from MI; mother  age 75 from lung cancer     Social History:  reports that she has quit smoking. She does not have any smokeless tobacco history on file. She reports that she does not use drugs.  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          Medications:  Prescriptions Prior to Admission           Prescriptions Prior to Admission   Medication Sig Dispense Refill Last Dose   • acetaminophen (TYLENOL) 650 MG 8 hr tablet Take 1,300 mg by mouth Every 12 (Twelve) Hours As Needed for Mild Pain .         • aspirin 81 MG EC tablet Take 81 mg by mouth Daily.         • bisoprolol-hydrochlorothiazide (ZIAC) 5-6.25 MG per tablet Take 1 tablet by mouth Daily. 90 tablet 3 Taking   • calcium carbonate (OS-HARPER) 600 MG tablet Take 600 mg by mouth Every Night.         • estradiol (CLIMARA) 0.1 MG/24HR patch 0.5 patches 1 (One) Time Per Week.     Taking   • ibuprofen (ADVIL,MOTRIN) 200 MG tablet Take 600 mg by mouth At Night As Needed for Mild Pain .         • levothyroxine (SYNTHROID, LEVOTHROID) 175 MCG tablet TAKE ONE TABLET BY MOUTH ONCE DAILY 90 tablet 1 Taking   • losartan-hydrochlorothiazide (HYZAAR) 100-25 MG per tablet Take 1 tablet by mouth Daily. 90 tablet 3 Taking   • Multiple Vitamins-Minerals (MULTIVITAMIN ADULT PO) Take 1 tablet by mouth Daily.         • omeprazole (priLOSEC) 40 MG capsule Take 1 capsule by mouth Daily. 90 capsule 3 Taking   • pravastatin (PRAVACHOL) 40 MG tablet  Take 1 tablet by mouth Daily. 90 tablet 3 Taking   • traMADol (ULTRAM) 50 MG tablet Take 1 tablet by mouth Every 6 (Six) Hours As Needed for Moderate Pain . 120 tablet 2 Taking   • diclofenac (FLECTOR) 1.3 % patch patch Apply 1 patch topically 2 (Two) Times a Day. 60 patch 2 Taking            No Known Allergies        Objective      Objective      Vital Signs:   Temp:  [98.4 °F (36.9 °C)-99.7 °F (37.6 °C)] 98.4 °F (36.9 °C)  Heart Rate:  [68] 68  Resp:  [16] 16  BP: (118-128)/(64-79) 118/77      Physical Exam   Constitutional: She is oriented to person, place, and time. She appears well-developed and well-nourished. No distress.   Well groomed, pleasant.  at bedside.    HENT:   Head: Normocephalic and atraumatic.   Eyes: Pupils are equal, round, and reactive to light. EOM are normal.   Neck: No JVD present. No tracheal deviation present.   Cardiovascular: Normal rate, regular rhythm, normal heart sounds and intact distal pulses.  Exam reveals no gallop and no friction rub.    Pulmonary/Chest: Effort normal. No respiratory distress.   Abdominal: Soft. She exhibits no distension. There is tenderness (mild with palpation).   The skin around the anal canal is intact without evidence of pathology  Digital exam without pain in the anal canal but with severe pain on palpation of a fullness near the anterior rectum which seems to be extra rectal, outside the rectum in nature.  This induced severe pain requiring sitting on the toilet for relief thereafter.  The fullness felt smooth and be on the rectal lumen.    Musculoskeletal: Normal range of motion. She exhibits no edema.   Neurological: She is alert and oriented to person, place, and time.   Skin: Skin is warm and dry. She is not diaphoretic.   Psychiatric: She has a normal mood and affect. Her behavior is normal. Judgment and thought content normal.            Results Reviewed:  I have personally reviewed current lab, radiology, and data and agree.                 Invalid input(s):  ALKPHOS, TROPONININT  CrCl cannot be calculated (Patient's most recent lab result is older than the maximum 30 days allowed.).                       Brief Urine Lab Results  (Last result in the past 365 days)       Color   Clarity   Blood   Leuk Est   Nitrite   Protein   CREAT   Urine HCG         11/01/17 1102 Yellow Clear Negative Negative Negative Negative                    No results found for: BNP      Imaging Results (last 24 hours)      ** No results found for the last 24 hours. **            CT scan reviewed, then further reviewed with Dr. Jasbir Swift, the inflammation seems to be more in the distal sigmoid and the rectum with multifocal phlegmon/early abscess with maximum diameter less than 2 cm.  Significant inflammatory change.        Assessment/Plan      Assessment / Plan          Hospital Problem List      * (Principal)Proctitis     Diverticulosis of large intestine without hemorrhage     Overview Addendum 8/6/2018 12:33 PM by Abraham Sousa MD       Descending and sigmoid on 2/18 colonoscopy           Rectal abscess     RLQ abdominal pain          Assessment & Plan:  Abx: Zosyn  Clear liquid diet  Labs  EKG  UA  Pain mgmt/bowel regimen  EKG     Findings most suggestive of complications from diverticulitis with multifocal phlegmon and abscess, largest collection less than 2 cm in diameter, not amenable to percutaneous drainage on discussion with radiology.  Severe pain with rectal exam consistent with pelvic phlegmon arising from the distal sigmoid.  Plans for IV antibiotic therapy, which appears more appropriate than oral antibiotic therapy given the complexity of infectious process.  Would anticipate prolonged course of antibiotic therapy needed if surgery can be avoided.    If responsive to antibiotic therapy, anticipate transitioning to outpatient management with ongoing antibiotics and interval physical exam.  Likely to consider interval elective resection based on  symptoms and CT scan findings of abscess.    Would reserve immediate surgery for difficulties refractory to medical management such as perforation, peritonitis, and refractory abscess/inflammation.    Discussed with Dr Santiago who happened to be on the hospital floor and was willing to consult re- antibiotic management for complex infectious process.    Electronically signed by Jose Antonio Mart MD at 8/7/2018 11:30 AM     Prince Zamorano MD at 8/7/2018  8:01 AM      Consult Orders:    1. Inpatient Infectious Diseases Consult [[id number]] ordered by Jose Antonio Mart MD at 08/07/18 0801                Demetria Paris  1955  [id number]    Date of Consult: 8/7/2018    Admit Date:  8/6/2018      Requesting Provider: Margi Sawyer DO  Evaluating Physician: Prince Zamorano MD    Chief Complaint: Acute abdominal pain    Reason for Consultation: Acute sigmoid diverticulitis    History of present illness:    Patient is a 62 y.o.  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  Upon review by Dr. Mart/Jamison, the CT scan was more consistent with distal sigmoid diverticulitis associated with multifocal phlegmon/abscess with largest collection less than 2 cm in diameter and not amenable to percutaneous drainage.  Empiric Zosyn initiated    Admission, abdominal pain had become constant, sharp, low abdomen, suprapubic to patient, worse with palpation, better with pain meds and worse with bowel movement.  No hematochezia melena or hematemesis.  No vomiting or diarrhea.    No headache photophobia or neck stiffness.  No shortness of breath cough or hemoptysis.  No dysuria hematuria or pyuria.  No air in her urine and no fecaluria.    No raw or undercooked food.  No  sheep/goat/cattle/livestock exposure; No unpasteurized milk or milk products.  No animal insect or arthropod exposure.  No tick bites.  No outdoor camping or hunting exposure.  No travel exposure.  No ill exposure.  No history of TB or TB exposure.   Denies a history of MRSA/VRE and no history of C. difficile or ESBL/KPC  organisms.    Past Medical History:   Diagnosis Date   • Chemical exposure     phenteramine   • Diverticulosis    • Fibroid, uterine    • Joint pain    • Migraine with aura    • Needle stick injury        Past Surgical History:   Procedure Laterality Date   • ABDOMINAL HYSTERECTOMY Bilateral 2007    Removal Of Both Ovaries   • BREAST BIOPSY Left    • BREAST BIOPSY Right    • BREAST EXCISIONAL BIOPSY     • COLONOSCOPY  02/14/2018   • GASTRIC BYPASS     • INCISIONAL BREAST BIOPSY Left 1998   • OOPHORECTOMY     • TOTAL KNEE ARTHROPLASTY Right    • TUBAL ABDOMINAL LIGATION  1980       Pediatric History   Patient Guardian Status   • Not on file.     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    Denies tobacco alcohol or illicit drugs    family history includes Breast cancer in her other; Breast cancer (age of onset: 50) in her paternal aunt; Diabetes in her maternal grandmother and other; Heart disease in her other and paternal grandfather; Stroke in her maternal grandfather and other.    No Known Allergies    Medication:  Current Facility-Administered Medications   Medication Dose Route Frequency Provider Last Rate Last Dose   • HYDROmorphone (DILAUDID) injection 0.25 mg  0.25 mg Intravenous Q2H PRN Charmaine Craven APRN   0.25 mg at 08/07/18 0759   • piperacillin-tazobactam (ZOSYN) 3.375 g in iso-osmotic dextrose 50 ml (premix)  3.375 g Intravenous Q8H Charmaine Craven APRN   3.375 g at 08/07/18 0609   • potassium chloride (MICRO-K) CR capsule 40 mEq  40 mEq Oral PRN Charmaine Craven APRN        Or   • potassium chloride  (KLOR-CON) packet 40 mEq  40 mEq Oral PRN Charmaine Craven APRN        Or   • potassium chloride 10 mEq in 100 mL IVPB  10 mEq Intravenous Q1H PRN Charmaine Craven APRN 100 mL/hr at 08/07/18 0502 10 mEq at 08/07/18 0502   • sodium chloride 0.9 % flush 1-10 mL  1-10 mL Intravenous PRN Margi Sawyer DO       • sodium chloride 0.9 % infusion  100 mL/hr Intravenous Continuous Margi Sawyer  mL/hr at 08/07/18 0002 100 mL/hr at 08/07/18 0002       Antibiotics:  Anti-Infectives     Ordered     Dose/Rate Route Frequency Start Stop    08/06/18 2247  piperacillin-tazobactam (ZOSYN) 3.375 g in iso-osmotic dextrose 50 ml (premix)     Ordering Provider:  Charmaine Craven APRN    3.375 g  over 4 Hours Intravenous Every 8 Hours 08/07/18 0600 08/11/18 0559    08/06/18 2247  piperacillin-tazobactam (ZOSYN) 3.375 g in iso-osmotic dextrose 50 ml (premix)     Ordering Provider:  Charmaine Craven APRN    3.375 g  over 30 Minutes Intravenous Once 08/06/18 2345 08/07/18 0032            Review of Systems    Constitutional-- No  chills or sweats.  Appetite diminished with generalized malaise and fatigue  Heent-- No new vision, hearing or throat complaints.  No epistaxis or oral sores.  Denies odynophagia or dysphagia.  No flashers, floaters or eye pain. No odynophagia or dysphagia. No headache, photophobia or neck stiffness.  CV-- No chest pain, palpitation or syncope  Resp-- No SOB/cough/Hemoptysis  GI- No  vomiting, or diarrhea.  No hematochezia, melena, or hematemesis. Denies jaundice or chronic liver disease.  -- No dysuria, hematuria, or flank pain.  Denies hesitancy, urgency or flank pain.  Lymph- no swollen lymph nodes in neck/axilla or groin.   Heme- No active bruising or bleeding; no Hx of DVT or PE.  MS-- no swelling or pain in the bones or joints of arms/legs.  No new back pain.  Neuro-- No acute focal weakness or numbness in the arms or legs.  No seizures.    Full 12 point review of systems reviewed and  "negative otherwise for acute complaints, except for above    Physical Exam:   Vital Signs   /81 (BP Location: Right arm, Patient Position: Sitting)   Pulse 81   Temp 98.7 °F (37.1 °C) (Oral)   Resp 18   Ht 165.1 cm (65\")   Wt 72 kg (158 lb 12.8 oz)   LMP  (LMP Unknown)   SpO2 95%   BMI 26.43 kg/m²      GENERAL: Awake and alert, in no acute distress.   HEENT: Normocephalic, atraumatic.  PERRL. EOMI. No conjunctival injection. No icterus. Oropharynx clear without evidence of thrush or exudate. No evidence of peridontal disease.    NECK: Supple without nuchal rigidity. No mass.  LYMPH: No cervical, axillary or inguinal lymphadenopathy.  HEART: RRR; No murmur, rubs, gallops.   LUNGS: Clear to auscultation bilaterally without wheezing, rales, rhonchi. Normal respiratory effort. Nonlabored. No dullness.  ABDOMEN: Soft, tender in the low abdomen, nondistended. Positive bowel sounds. No rebound or guarding. NO mass or HSM.  EXT:  No cyanosis, clubbing or edema. No cord.  : Genitalia generally unremarkable.  Without Finney catheter.  MSK: FROM without joint effusions noted arms/legs.    SKIN: Warm and dry without cutaneous eruptions on Inspection/palpation.    NEURO: Oriented to PPT. No focal deficits on motor/sensory exam at arms/legs.  PSYCHIATRIC: Normal insight and judgement. Cooperative with PE    No peripheral stigmata/phenomena of endocarditis    Laboratory Data      Results from last 7 days  Lab Units 08/07/18 0417 08/06/18  2222   WBC 10*3/mm3 10.53 13.07*   HEMOGLOBIN g/dL 12.4 13.4   HEMATOCRIT % 37.7 40.2   PLATELETS 10*3/mm3 252 276       Results from last 7 days  Lab Units 08/07/18  0417   SODIUM mmol/L 140   POTASSIUM mmol/L 3.5   CHLORIDE mmol/L 103   CO2 mmol/L 29.0   BUN mg/dL 18   CREATININE mg/dL 0.71   GLUCOSE mg/dL 115*   CALCIUM mg/dL 9.1       Results from last 7 days  Lab Units 08/07/18  0417   ALK PHOS U/L 79   BILIRUBIN mg/dL 0.6   ALT (SGPT) U/L 14   AST (SGOT) U/L 14           "     Estimated Creatinine Clearance: 80.7 mL/min (by C-G formula based on SCr of 0.71 mg/dL).      Microbiology:      Radiology:  Imaging Results (last 72 hours)     ** No results found for the last 72 hours. **            Impression:     --Acute abdominal pain with abnormal imaging raising concern for acute distal sigmoid diverticulitis compounded by multifocal phlegmon/abscess but small in size and not amenable to percutaneous drainage per radiology/CRS; no history of MDR organisms and currently no diarrhea or other specific exposure.  White blood cell count improved with Zosyn and no progressive symptoms at present.  Patient understands antibiotics are not a guarantee for cure with risk for persistent/progression and potential need for surgery in the future.     --History gastric bypass    --History diverticulosis    PLAN: Thank you for asking us to see Demetria Paris, I recommend the following:    --IV Zosyn    --I discussed potential risks and benefits of the prescribed antibiotics that include, but are not limited to, solid organ toxicity, renal toxicity, CDiff, cytopenias, hypersensitivity,  etc.. Patient/Family voice understanding and agree to proceed.     --Check/review labs cultures and scans    --Monitor IV and IV antibiotic with risk for systemic complication and potential drug interaction    --History per  and nursing staff    --Discussed with microbiology    --Discussed with Dr. Mart    --Highly complex set of issues with high risk for further serious morbidity and other serious sequela    Prince Zamorano MD  8/7/2018                Electronically signed by Prince Zamorano MD at 8/7/2018 11:53 AM

## 2018-08-07 NOTE — CONSULTS
Demetria Paris  1955  [id number]    Date of Consult: 8/7/2018    Admit Date:  8/6/2018      Requesting Provider: Margi Sawyer DO  Evaluating Physician: Prince Zamorano MD    Chief Complaint: Acute abdominal pain    Reason for Consultation: Acute sigmoid diverticulitis    History of present illness:    Patient is a 62 y.o.  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  Upon review by Dr. Mart/Jamison, the CT scan was more consistent with distal sigmoid diverticulitis associated with multifocal phlegmon/abscess with largest collection less than 2 cm in diameter and not amenable to percutaneous drainage.  Empiric Zosyn initiated    Admission, abdominal pain had become constant, sharp, low abdomen, suprapubic to patient, worse with palpation, better with pain meds and worse with bowel movement.  No hematochezia melena or hematemesis.  No vomiting or diarrhea.    No headache photophobia or neck stiffness.  No shortness of breath cough or hemoptysis.  No dysuria hematuria or pyuria.  No air in her urine and no fecaluria.    No raw or undercooked food.  No sheep/goat/cattle/livestock exposure; No unpasteurized milk or milk products.  No animal insect or arthropod exposure.  No tick bites.  No outdoor camping or hunting exposure.  No travel exposure.  No ill exposure.  No history of TB or TB exposure.   Denies a history of MRSA/VRE and no history of C. difficile or ESBL/KPC  organisms.    Past Medical History:   Diagnosis Date   • Chemical exposure     phenteramine   • Diverticulosis    • Fibroid, uterine    • Joint pain    • Migraine with aura    • Needle stick injury        Past Surgical History:   Procedure Laterality Date   • ABDOMINAL HYSTERECTOMY  Bilateral 2007    Removal Of Both Ovaries   • BREAST BIOPSY Left    • BREAST BIOPSY Right    • BREAST EXCISIONAL BIOPSY     • COLONOSCOPY  02/14/2018   • GASTRIC BYPASS     • INCISIONAL BREAST BIOPSY Left 1998   • OOPHORECTOMY     • TOTAL KNEE ARTHROPLASTY Right    • TUBAL ABDOMINAL LIGATION  1980       Pediatric History   Patient Guardian Status   • Not on file.     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    Denies tobacco alcohol or illicit drugs    family history includes Breast cancer in her other; Breast cancer (age of onset: 50) in her paternal aunt; Diabetes in her maternal grandmother and other; Heart disease in her other and paternal grandfather; Stroke in her maternal grandfather and other.    No Known Allergies    Medication:  Current Facility-Administered Medications   Medication Dose Route Frequency Provider Last Rate Last Dose   • HYDROmorphone (DILAUDID) injection 0.25 mg  0.25 mg Intravenous Q2H PRN Charmaine Craven APRN   0.25 mg at 08/07/18 0759   • piperacillin-tazobactam (ZOSYN) 3.375 g in iso-osmotic dextrose 50 ml (premix)  3.375 g Intravenous Q8H Charmaine Craven APRN   3.375 g at 08/07/18 0627   • potassium chloride (MICRO-K) CR capsule 40 mEq  40 mEq Oral PRN Charmaine Craven APRKRISTINE        Or   • potassium chloride (KLOR-CON) packet 40 mEq  40 mEq Oral PRN Charmaine Craven APRKRISTINE        Or   • potassium chloride 10 mEq in 100 mL IVPB  10 mEq Intravenous Q1H PRN Charmaine Craven APRN 100 mL/hr at 08/07/18 0502 10 mEq at 08/07/18 0502   • sodium chloride 0.9 % flush 1-10 mL  1-10 mL Intravenous PRN Margi Sawyer DO       • sodium chloride 0.9 % infusion  100 mL/hr Intravenous Continuous Margi Sawyer  mL/hr at 08/07/18 0002 100 mL/hr at 08/07/18 0002       Antibiotics:  Anti-Infectives     Ordered     Dose/Rate Route Frequency Start Stop    08/06/18 2842  piperacillin-tazobactam (ZOSYN) 3.375 g in iso-osmotic  "dextrose 50 ml (premix)     Ordering Provider:  Charmaine Craven APRN    3.375 g  over 4 Hours Intravenous Every 8 Hours 08/07/18 0600 08/11/18 0559    08/06/18 2247  piperacillin-tazobactam (ZOSYN) 3.375 g in iso-osmotic dextrose 50 ml (premix)     Ordering Provider:  Charmaine Craven APRN    3.375 g  over 30 Minutes Intravenous Once 08/06/18 2345 08/07/18 0032            Review of Systems    Constitutional-- No  chills or sweats.  Appetite diminished with generalized malaise and fatigue  Heent-- No new vision, hearing or throat complaints.  No epistaxis or oral sores.  Denies odynophagia or dysphagia.  No flashers, floaters or eye pain. No odynophagia or dysphagia. No headache, photophobia or neck stiffness.  CV-- No chest pain, palpitation or syncope  Resp-- No SOB/cough/Hemoptysis  GI- No  vomiting, or diarrhea.  No hematochezia, melena, or hematemesis. Denies jaundice or chronic liver disease.  -- No dysuria, hematuria, or flank pain.  Denies hesitancy, urgency or flank pain.  Lymph- no swollen lymph nodes in neck/axilla or groin.   Heme- No active bruising or bleeding; no Hx of DVT or PE.  MS-- no swelling or pain in the bones or joints of arms/legs.  No new back pain.  Neuro-- No acute focal weakness or numbness in the arms or legs.  No seizures.    Full 12 point review of systems reviewed and negative otherwise for acute complaints, except for above    Physical Exam:   Vital Signs   /81 (BP Location: Right arm, Patient Position: Sitting)   Pulse 81   Temp 98.7 °F (37.1 °C) (Oral)   Resp 18   Ht 165.1 cm (65\")   Wt 72 kg (158 lb 12.8 oz)   LMP  (LMP Unknown)   SpO2 95%   BMI 26.43 kg/m²     GENERAL: Awake and alert, in no acute distress.   HEENT: Normocephalic, atraumatic.  PERRL. EOMI. No conjunctival injection. No icterus. Oropharynx clear without evidence of thrush or exudate. No evidence of peridontal disease.    NECK: Supple without nuchal rigidity. No mass.  LYMPH: No cervical, " axillary or inguinal lymphadenopathy.  HEART: RRR; No murmur, rubs, gallops.   LUNGS: Clear to auscultation bilaterally without wheezing, rales, rhonchi. Normal respiratory effort. Nonlabored. No dullness.  ABDOMEN: Soft, tender in the low abdomen, nondistended. Positive bowel sounds. No rebound or guarding. NO mass or HSM.  EXT:  No cyanosis, clubbing or edema. No cord.  : Genitalia generally unremarkable.  Without Finney catheter.  MSK: FROM without joint effusions noted arms/legs.    SKIN: Warm and dry without cutaneous eruptions on Inspection/palpation.    NEURO: Oriented to PPT. No focal deficits on motor/sensory exam at arms/legs.  PSYCHIATRIC: Normal insight and judgement. Cooperative with PE    No peripheral stigmata/phenomena of endocarditis    Laboratory Data      Results from last 7 days  Lab Units 08/07/18  0417 08/06/18  2222   WBC 10*3/mm3 10.53 13.07*   HEMOGLOBIN g/dL 12.4 13.4   HEMATOCRIT % 37.7 40.2   PLATELETS 10*3/mm3 252 276       Results from last 7 days  Lab Units 08/07/18  0417   SODIUM mmol/L 140   POTASSIUM mmol/L 3.5   CHLORIDE mmol/L 103   CO2 mmol/L 29.0   BUN mg/dL 18   CREATININE mg/dL 0.71   GLUCOSE mg/dL 115*   CALCIUM mg/dL 9.1       Results from last 7 days  Lab Units 08/07/18  0417   ALK PHOS U/L 79   BILIRUBIN mg/dL 0.6   ALT (SGPT) U/L 14   AST (SGOT) U/L 14               Estimated Creatinine Clearance: 80.7 mL/min (by C-G formula based on SCr of 0.71 mg/dL).      Microbiology:      Radiology:  Imaging Results (last 72 hours)     ** No results found for the last 72 hours. **            Impression:     --Acute abdominal pain with abnormal imaging raising concern for acute distal sigmoid diverticulitis compounded by multifocal phlegmon/abscess but small in size and not amenable to percutaneous drainage per radiology/CRS; no history of MDR organisms and currently no diarrhea or other specific exposure.  White blood cell count improved with Zosyn and no progressive symptoms at  present.  Patient understands antibiotics are not a guarantee for cure with risk for persistent/progression and potential need for surgery in the future.     --History gastric bypass    --History diverticulosis    PLAN: Thank you for asking us to see Demetria Paris, I recommend the following:    --IV Zosyn    --I discussed potential risks and benefits of the prescribed antibiotics that include, but are not limited to, solid organ toxicity, renal toxicity, CDiff, cytopenias, hypersensitivity,  etc.. Patient/Family voice understanding and agree to proceed.     --Check/review labs cultures and scans    --Monitor IV and IV antibiotic with risk for systemic complication and potential drug interaction    --History per  and nursing staff    --Discussed with microbiology    --Discussed with Dr. Mart    --Highly complex set of issues with high risk for further serious morbidity and other serious sequela    Prince Zamorano MD  8/7/2018

## 2018-08-07 NOTE — PROGRESS NOTES
Malnutrition Severity Assessment    Patient Name:  Demetria Paris  YOB: 1955  MRN: [id number]  Admit Date:  8/6/2018    Patient meets criteria for : Moderate malnutrition (Patient currently meets criteria for moderate, acute malnutrition based on reported intake hx and reported/documented weight hx.)    Malnutrition Type: Acute Illness/Injury Malnutrition     Malnutrition Type (last 8 hours)      Malnutrition Severity Assessment     Row Name 08/07/18 1601       Malnutrition Severity Assessment    Malnutrition Type Acute Illness/Injury Malnutrition    Row Name 08/07/18 1601       Weight Status (Acute)    BMI --   BMI = 26.4    %IBW --   126% IW    %UBW --   96% UBW    Weight Loss Severe (>2% / 1 wk)   unintentional weight loss of 6 lbs (3.6%) x 1 week    Row Name 08/07/18 1601       Energy Intake Status (Acute)    Energy Intake Severe (< or equal to 50% / > or equal to 5d)   50% of usual PO intake x 1 week    Row Name 08/07/18 1601       Criteria Met (Must meet criteria for severity in at least 2 of these categories: M Wasting, Fat Loss, Fluid, Secondary Signs, Wt. Status, Intake)    Patient meets criteria for  Moderate malnutrition   Patient currently meets criteria for moderate, acute malnutrition based on reported intake hx and reported/documented weight hx.          Electronically signed by:  Peyton Ahmadi RD  08/07/18 4:04 PM

## 2018-08-08 LAB
ANION GAP SERPL CALCULATED.3IONS-SCNC: 10 MMOL/L (ref 3–11)
BUN BLD-MCNC: 9 MG/DL (ref 9–23)
BUN/CREAT SERPL: 16.1 (ref 7–25)
CALCIUM SPEC-SCNC: 9.1 MG/DL (ref 8.7–10.4)
CHLORIDE SERPL-SCNC: 110 MMOL/L (ref 99–109)
CO2 SERPL-SCNC: 26 MMOL/L (ref 20–31)
CREAT BLD-MCNC: 0.56 MG/DL (ref 0.6–1.3)
DEPRECATED RDW RBC AUTO: 41.1 FL (ref 37–54)
ERYTHROCYTE [DISTWIDTH] IN BLOOD BY AUTOMATED COUNT: 12.7 % (ref 11.3–14.5)
GFR SERPL CREATININE-BSD FRML MDRD: 109 ML/MIN/1.73
GLUCOSE BLD-MCNC: 124 MG/DL (ref 70–100)
HCT VFR BLD AUTO: 35.3 % (ref 34.5–44)
HGB BLD-MCNC: 11.5 G/DL (ref 11.5–15.5)
MCH RBC QN AUTO: 28.8 PG (ref 27–31)
MCHC RBC AUTO-ENTMCNC: 32.6 G/DL (ref 32–36)
MCV RBC AUTO: 88.3 FL (ref 80–99)
PLATELET # BLD AUTO: 238 10*3/MM3 (ref 150–450)
PMV BLD AUTO: 10.6 FL (ref 6–12)
POTASSIUM BLD-SCNC: 3.9 MMOL/L (ref 3.5–5.5)
RBC # BLD AUTO: 4 10*6/MM3 (ref 3.89–5.14)
SODIUM BLD-SCNC: 146 MMOL/L (ref 132–146)
WBC NRBC COR # BLD: 8.72 10*3/MM3 (ref 3.5–10.8)

## 2018-08-08 PROCEDURE — 25010000002 ERTAPENEM 1 GM/100ML SOLUTION: Performed by: INTERNAL MEDICINE

## 2018-08-08 PROCEDURE — 25010000002 PIPERACILLIN SOD-TAZOBACTAM PER 1 G: Performed by: NURSE PRACTITIONER

## 2018-08-08 PROCEDURE — 99232 SBSQ HOSP IP/OBS MODERATE 35: CPT | Performed by: PHYSICIAN ASSISTANT

## 2018-08-08 PROCEDURE — 80048 BASIC METABOLIC PNL TOTAL CA: CPT | Performed by: INTERNAL MEDICINE

## 2018-08-08 PROCEDURE — 63710000001 DIPHENHYDRAMINE PER 50 MG: Performed by: NURSE PRACTITIONER

## 2018-08-08 PROCEDURE — 85027 COMPLETE CBC AUTOMATED: CPT | Performed by: INTERNAL MEDICINE

## 2018-08-08 PROCEDURE — 63710000001 DIPHENHYDRAMINE PER 50 MG: Performed by: INTERNAL MEDICINE

## 2018-08-08 RX ORDER — BISOPROLOL FUMARATE AND HYDROCHLOROTHIAZIDE 5; 6.25 MG/1; MG/1
1 TABLET ORAL DAILY
Status: DISCONTINUED | OUTPATIENT
Start: 2018-08-08 | End: 2018-08-09 | Stop reason: HOSPADM

## 2018-08-08 RX ORDER — ASPIRIN 81 MG/1
81 TABLET ORAL DAILY
Status: DISCONTINUED | OUTPATIENT
Start: 2018-08-08 | End: 2018-08-09 | Stop reason: HOSPADM

## 2018-08-08 RX ORDER — DIPHENHYDRAMINE HCL 50 MG
50 CAPSULE ORAL NIGHTLY PRN
Status: DISCONTINUED | OUTPATIENT
Start: 2018-08-08 | End: 2018-08-09 | Stop reason: HOSPADM

## 2018-08-08 RX ORDER — DIPHENHYDRAMINE HCL 25 MG
25 CAPSULE ORAL ONCE
Status: COMPLETED | OUTPATIENT
Start: 2018-08-08 | End: 2018-08-08

## 2018-08-08 RX ORDER — LOSARTAN POTASSIUM AND HYDROCHLOROTHIAZIDE 25; 100 MG/1; MG/1
1 TABLET ORAL DAILY
Status: DISCONTINUED | OUTPATIENT
Start: 2018-08-08 | End: 2018-08-09 | Stop reason: HOSPADM

## 2018-08-08 RX ORDER — PANTOPRAZOLE SODIUM 40 MG/1
40 TABLET, DELAYED RELEASE ORAL DAILY
Status: DISCONTINUED | OUTPATIENT
Start: 2018-08-08 | End: 2018-08-09 | Stop reason: HOSPADM

## 2018-08-08 RX ADMIN — BISOPROLOL FUMARATE AND HYDROCHLOROTHIAZIDE 1 TABLET: 5; 6.25 TABLET ORAL at 11:49

## 2018-08-08 RX ADMIN — ASPIRIN 81 MG: 81 TABLET, COATED ORAL at 10:33

## 2018-08-08 RX ADMIN — DIPHENHYDRAMINE HYDROCHLORIDE 25 MG: 25 CAPSULE ORAL at 01:14

## 2018-08-08 RX ADMIN — LOSARTAN POTASSIUM AND HYDROCHLOROTHIAZIDE 1 TABLET: 100; 25 TABLET, FILM COATED ORAL at 11:49

## 2018-08-08 RX ADMIN — TAZOBACTAM SODIUM AND PIPERACILLIN SODIUM 3.38 G: 375; 3 INJECTION, SOLUTION INTRAVENOUS at 05:27

## 2018-08-08 RX ADMIN — PANTOPRAZOLE SODIUM 40 MG: 40 TABLET, DELAYED RELEASE ORAL at 13:06

## 2018-08-08 RX ADMIN — POLYETHYLENE GLYCOL (3350) 17 G: 17 POWDER, FOR SOLUTION ORAL at 08:25

## 2018-08-08 RX ADMIN — ERTAPENEM SODIUM 1 G: 1 INJECTION, POWDER, LYOPHILIZED, FOR SOLUTION INTRAMUSCULAR; INTRAVENOUS at 11:49

## 2018-08-08 RX ADMIN — SODIUM CHLORIDE 75 ML/HR: 9 INJECTION, SOLUTION INTRAVENOUS at 19:45

## 2018-08-08 RX ADMIN — POLYETHYLENE GLYCOL (3350) 17 G: 17 POWDER, FOR SOLUTION ORAL at 20:21

## 2018-08-08 RX ADMIN — SODIUM CHLORIDE 100 ML/HR: 9 INJECTION, SOLUTION INTRAVENOUS at 05:27

## 2018-08-08 RX ADMIN — DIPHENHYDRAMINE HYDROCHLORIDE 50 MG: 50 CAPSULE ORAL at 22:56

## 2018-08-08 RX ADMIN — ERTAPENEM SODIUM 1 G: 1 INJECTION, POWDER, LYOPHILIZED, FOR SOLUTION INTRAMUSCULAR; INTRAVENOUS at 11:48

## 2018-08-08 RX ADMIN — LEVOTHYROXINE SODIUM 175 MCG: 25 TABLET ORAL at 10:33

## 2018-08-08 NOTE — PROGRESS NOTES
[facility] Medicine Services  PROGRESS NOTE    Patient Name: Demetria Paris  : 1955  MRN: [id number]    Date of Admission: 2018  Length of Stay: 2  Primary Care Physician: Abraham Sousa MD    Subjective     CC: f/u rectal abscess     HPI:  Feeling well today. Tolerating PO intake but hasn't eaten much and no bowel movement since admission. Anxious to go home but understands need for ongoing monitoring. Wants to shower and asking why she hasn't been getting her home meds.     Review of Systems  Gen- No fevers, chills  CV- No chest pain, palpitations  Resp- No cough, dyspnea  GI- No N/V/D, abd pain  Otherwise ROS is negative except as mentioned in the HPI.    Objective     Vital Signs:   Temp:  [98.1 °F (36.7 °C)-101 °F (38.3 °C)] 98.1 °F (36.7 °C)  Heart Rate:  [70-89] 70  Resp:  [15-16] 16  BP: (119-155)/(59-84) 155/80        Physical Exam:  Constitutional: No acute distress, awake, alert  HENT: NCAT, mucous membranes moist  Respiratory: Clear to auscultation bilaterally, respiratory effort normal   Cardiovascular: RRR, no murmurs, rubs, or gallops, palpable pedal pulses bilaterally  Gastrointestinal: Positive bowel sounds, soft, nontender, nondistended  Musculoskeletal: No bilateral ankle edema  Psychiatric: Appropriate affect, cooperative  Neurologic: Oriented x 3, strength symmetric in all extremities, Cranial Nerves grossly intact to confrontation, speech clear  Skin: No rashes    Results Reviewed:  I have personally reviewed current lab, radiology, and data and agree.      Results from last 7 days  Lab Units 18  0340 18  04118  2222   WBC 10*3/mm3 8.72 10.53 13.07*   HEMOGLOBIN g/dL 11.5 12.4 13.4   HEMATOCRIT % 35.3 37.7 40.2   PLATELETS 10*3/mm3 238 252 276   INR   --   --  0.93       Results from last 7 days  Lab Units 18  0340 18  0417 18  2222   SODIUM mmol/L 146 140 141   POTASSIUM mmol/L 3.9 3.5 3.5   CHLORIDE mmol/L 110*  103 102   CO2 mmol/L 26.0 29.0 26.0   BUN mg/dL 9 18 16   CREATININE mg/dL 0.56* 0.71 0.79   GLUCOSE mg/dL 124* 115* 131*   CALCIUM mg/dL 9.1 9.1 9.9   ALT (SGPT) U/L  --  14 16   AST (SGOT) U/L  --  14 17     Estimated Creatinine Clearance: 102.3 mL/min (A) (by C-G formula based on SCr of 0.56 mg/dL (L)).  No results found for: BNP    Microbiology Results Abnormal     Procedure Component Value - Date/Time    Blood Culture - Blood, [[id number]]  (Normal) Collected:  08/06/18 2221    Lab Status:  Preliminary result Specimen:  Blood from Arm, Left Updated:  08/07/18 2246     Blood Culture No growth at 24 hours    Blood Culture - Blood, [[id number]]  (Normal) Collected:  08/06/18 2221    Lab Status:  Preliminary result Specimen:  Blood from Arm, Right Updated:  08/07/18 2246     Blood Culture No growth at 24 hours        Imaging Results (last 24 hours)     ** No results found for the last 24 hours. **        I have reviewed the medications.    Assessment / Plan     Hospital Problem List     * (Principal)Proctitis    Diverticulosis of large intestine without hemorrhage    Overview Addendum 8/6/2018 12:33 PM by Abraham Sousa MD     Descending and sigmoid on 2/18 colonoscopy         Rectal abscess    RLQ abdominal pain        Brief Hospital Course to date:  Demetria Paris is a 62 y.o. female with PMH of gastric bypass, diverticulosis and GERD presents with acute abd pain that started four days prior to admission, CT A/P ordered by her PCP showed severe proctitis with suspected 2.6cm intramural abscess, but could not rule out neoplasm. Admitted to our service.     Plan:  - Appreciate CRS evaluation. Dr. Mart has seen the patient and feels this is likely multifocal phlegmon/early abscess, most suggestive of complications from diverticulitis. Fluid collection not amenable to drainage. Hopeful surgery can be avoided.   - Appreciate ID evaluation. Mr. Zamorano converting to Invanz IV with plans for outpatient IV antibiotics    - BP up, resume home antihypertensives  - Resume synthroid, PPI   - Decrease IV fluids   - Regular diet     DVT Prophylaxis: mechanical  CODE STATUS:   Code Status and Medical Interventions:   Ordered at: 08/06/18 2208     Level Of Support Discussed With:    Patient     Code Status:    CPR     Medical Interventions (Level of Support Prior to Arrest):    Full     Disposition: I expect the patient to be discharged home tomorrow on IV abx     Electronically signed by Teresa Gonzalez PA-C, 08/08/18, 11:38 AM.

## 2018-08-08 NOTE — PROGRESS NOTES
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  Upon review by Dr. Mart/Jamison, the CT scan was more consistent with distal sigmoid diverticulitis associated with multifocal phlegmon/abscess with largest collection less than 2 cm in diameter and not amenable to percutaneous drainage.  Empiric Zosyn initiated     8/8/18 abdominal pain less intense, low abdomen, worse with palpation, better since admission and better with pain meds.  Pain 2-3 out of 10 at present.  No hematochezia melena or hematemesis.  No vomiting or diarrhea.     No headache photophobia or neck stiffness.  No shortness of breath cough or hemoptysis. No dysuria hematuria or pyuria.  No air in her urine and no fecaluria.    ROS:      8/8/18 No f/c/s. No n/v/d. No rash. No new ADR to Abx.     PE:   /80 (BP Location: Left arm, Patient Position: Lying)   Pulse 70   Temp 98.1 °F (36.7 °C) (Oral)   Resp 16   Ht 165.1 cm (65\")   Wt 72 kg (158 lb 12.8 oz)   LMP  (LMP Unknown)   SpO2 99%   BMI 26.43 kg/m²     GENERAL: Awake and alert, in no acute distress.   HEENT:  No conjunctival injection. No icterus. Oropharynx clear without " evidence of thrush or exudate.     HEART: RRR; No murmur, rubs, gallops.   LUNGS: Clear to auscultation bilaterally without wheezing, rales, rhonchi. Normal respiratory effort. Nonlabored. No dullness.  ABDOMEN: Soft, mildly tender, nondistended. Positive bowel sounds. No rebound or guarding. NO mass or HSM.  EXT:  No cyanosis, clubbing or edema. No cord.  : Genitalia generally unremarkable.  Without Finney catheter.  SKIN: Warm and dry without cutaneous eruptions on Inspection/palpation.      Laboratory Data      Results from last 7 days  Lab Units 08/08/18  0340 08/07/18  0417 08/06/18  2222   WBC 10*3/mm3 8.72 10.53 13.07*   HEMOGLOBIN g/dL 11.5 12.4 13.4   HEMATOCRIT % 35.3 37.7 40.2   PLATELETS 10*3/mm3 238 252 276       Results from last 7 days  Lab Units 08/08/18  0340   SODIUM mmol/L 146   POTASSIUM mmol/L 3.9   CHLORIDE mmol/L 110*   CO2 mmol/L 26.0   BUN mg/dL 9   CREATININE mg/dL 0.56*   GLUCOSE mg/dL 124*   CALCIUM mg/dL 9.1       Results from last 7 days  Lab Units 08/07/18  0417   ALK PHOS U/L 79   BILIRUBIN mg/dL 0.6   ALT (SGPT) U/L 14   AST (SGOT) U/L 14               Estimated Creatinine Clearance: 102.3 mL/min (A) (by C-G formula based on SCr of 0.56 mg/dL (L)).      Microbiology:      Radiology:  Imaging Results (last 72 hours)     ** No results found for the last 72 hours. **            Impression:   --Acute abdominal pain with abnormal imaging raising concern for acute distal sigmoid diverticulitis compounded by multifocal phlegmon/abscess but small in size and not amenable to percutaneous drainage per radiology/CRS; no history of MDR organisms and currently no diarrhea or other specific exposure.  White blood cell count improved with Zosyn and no progressive symptoms at present, transitioned to empiric Invanz to help facilitate outpatient treatment.  Patient understands antibiotics are not a guarantee for cure with risk for persistent/progression and potential need for surgery in the future.       --History gastric bypass     --History diverticulosis    PLAN:   --IV Invanz     --I discussed potential risks and benefits of the prescribed antibiotics that include, but are not limited to, solid organ toxicity, renal toxicity, CDiff, cytopenias, hypersensitivity,  etc.. Patient/Family voice understanding and agree to proceed.      --Check/review labs cultures and scans     --Monitor IV and IV antibiotic with risk for systemic complication and potential drug interaction     --History per  and nursing staff     --Discussed with microbiology     --Discussed with Dr. Mart/Ellis     --Highly complex set of issues with high risk for further serious morbidity and other serious sequela     --I anticipate her starting on IV Invanz 1 g IV daily in office today after discharge.    Prince Zamorano MD  8/8/2018

## 2018-08-08 NOTE — PLAN OF CARE
Problem: Patient Care Overview  Goal: Plan of Care Review  Outcome: Ongoing (interventions implemented as appropriate)   08/08/18 0408   Coping/Psychosocial   Plan of Care Reviewed With patient   Plan of Care Review   Progress improving   OTHER   Outcome Summary Improved WBC's. C/O tolerable pain - no narcs administered. Ambulating safelty. Vitals stable. Anticipating D/C today       Problem: Infection, Risk/Actual (Adult)  Goal: Infection Prevention/Resolution  Outcome: Ongoing (interventions implemented as appropriate)   08/08/18 0408   Infection, Risk/Actual (Adult)   Infection Prevention/Resolution making progress toward outcome       Problem: Fall Risk (Adult)  Goal: Identify Related Risk Factors and Signs and Symptoms  Outcome: Ongoing (interventions implemented as appropriate)   08/08/18 0408   Fall Risk (Adult)   Related Risk Factors (Fall Risk) environment unfamiliar   Signs and Symptoms (Fall Risk) presence of risk factors     Goal: Absence of Fall  Outcome: Ongoing (interventions implemented as appropriate)   08/08/18 0408   Fall Risk (Adult)   Absence of Fall making progress toward outcome       Problem: Pain, Acute (Adult)  Goal: Identify Related Risk Factors and Signs and Symptoms  Outcome: Ongoing (interventions implemented as appropriate)   08/08/18 0408   Pain, Acute (Adult)   Related Risk Factors (Acute Pain) infection   Signs and Symptoms (Acute Pain) verbalization of pain descriptors     Goal: Acceptable Pain Control/Comfort Level  Outcome: Ongoing (interventions implemented as appropriate)   08/08/18 0408   Pain, Acute (Adult)   Acceptable Pain Control/Comfort Level making progress toward outcome

## 2018-08-08 NOTE — PLAN OF CARE
Problem: Patient Care Overview  Goal: Plan of Care Review  Outcome: Ongoing (interventions implemented as appropriate)   08/07/18 0408 08/08/18 0823 08/08/18 1622   Coping/Psychosocial   Plan of Care Reviewed With --  patient --    Coping/Psychosocial   Patient Agreement with Plan of Care agrees --  --    OTHER   Outcome Summary --  --  VSS, pt denies pain or discomfort. has ambulated in halls. States she is feeling much better and tolerating diet. Will continue to monitor. Possibly home tomorrow.       Problem: Infection, Risk/Actual (Adult)  Goal: Identify Related Risk Factors and Signs and Symptoms  Outcome: Outcome(s) achieved Date Met: 08/08/18    Goal: Infection Prevention/Resolution  Outcome: Ongoing (interventions implemented as appropriate)      Problem: Fall Risk (Adult)  Goal: Identify Related Risk Factors and Signs and Symptoms  Outcome: Outcome(s) achieved Date Met: 08/08/18    Goal: Absence of Fall  Outcome: Ongoing (interventions implemented as appropriate)      Problem: Pain, Acute (Adult)  Goal: Identify Related Risk Factors and Signs and Symptoms  Outcome: Outcome(s) achieved Date Met: 08/08/18    Goal: Acceptable Pain Control/Comfort Level  Outcome: Ongoing (interventions implemented as appropriate)

## 2018-08-09 VITALS
WEIGHT: 158.8 LBS | RESPIRATION RATE: 16 BRPM | TEMPERATURE: 98.5 F | OXYGEN SATURATION: 97 % | BODY MASS INDEX: 26.46 KG/M2 | HEART RATE: 67 BPM | DIASTOLIC BLOOD PRESSURE: 76 MMHG | SYSTOLIC BLOOD PRESSURE: 133 MMHG | HEIGHT: 65 IN

## 2018-08-09 LAB — CREAT BLDA-MCNC: 0.8 MG/DL (ref 0.6–1.3)

## 2018-08-09 PROCEDURE — 25010000002 ERTAPENEM 1 GM/100ML SOLUTION: Performed by: INTERNAL MEDICINE

## 2018-08-09 PROCEDURE — 99239 HOSP IP/OBS DSCHRG MGMT >30: CPT | Performed by: PHYSICIAN ASSISTANT

## 2018-08-09 RX ORDER — SACCHAROMYCES BOULARDII 250 MG
250 CAPSULE ORAL 2 TIMES DAILY
Qty: 60 CAPSULE | Refills: 1 | Status: SHIPPED | OUTPATIENT
Start: 2018-08-09 | End: 2019-08-01

## 2018-08-09 RX ORDER — ACETAMINOPHEN 325 MG/1
650 TABLET ORAL EVERY 6 HOURS PRN
Start: 2018-08-09

## 2018-08-09 RX ADMIN — ACETAMINOPHEN 650 MG: 325 TABLET, FILM COATED ORAL at 08:19

## 2018-08-09 RX ADMIN — BISOPROLOL FUMARATE AND HYDROCHLOROTHIAZIDE 1 TABLET: 5; 6.25 TABLET ORAL at 08:18

## 2018-08-09 RX ADMIN — PANTOPRAZOLE SODIUM 40 MG: 40 TABLET, DELAYED RELEASE ORAL at 08:17

## 2018-08-09 RX ADMIN — ERTAPENEM SODIUM 1 G: 1 INJECTION, POWDER, LYOPHILIZED, FOR SOLUTION INTRAMUSCULAR; INTRAVENOUS at 11:18

## 2018-08-09 RX ADMIN — ASPIRIN 81 MG: 81 TABLET, COATED ORAL at 08:17

## 2018-08-09 RX ADMIN — LOSARTAN POTASSIUM AND HYDROCHLOROTHIAZIDE 1 TABLET: 100; 25 TABLET, FILM COATED ORAL at 08:18

## 2018-08-09 RX ADMIN — LEVOTHYROXINE SODIUM 175 MCG: 25 TABLET ORAL at 06:11

## 2018-08-09 RX ADMIN — POLYETHYLENE GLYCOL (3350) 17 G: 17 POWDER, FOR SOLUTION ORAL at 08:17

## 2018-08-09 NOTE — DISCHARGE INSTR - OTHER ORDERS
8/10 at 11 am Appointment at [facility] [address] [place] [address] [address]. Arrive at 1045.

## 2018-08-09 NOTE — PROGRESS NOTES
"[facility] ([facility])    Less suprapubic pain  Tolerating diet    Vital Signs:  Blood pressure 154/76, pulse 66, temperature 97.9 °F (36.6 °C), temperature source Oral, resp. rate 14, height 165.1 cm (65\"), weight 72 kg (158 lb 12.8 oz), SpO2 98 %, currently breastfeeding.    Labs past 24 hours:  Lab Results (last 24 hours)     Procedure Component Value Units Date/Time    Basic Metabolic Panel [[id number]]  (Abnormal) Collected:  08/08/18 0340    Specimen:  Blood Updated:  08/08/18 0601     Glucose 124 (H) mg/dL      BUN 9 mg/dL      Creatinine 0.56 (L) mg/dL      Sodium 146 mmol/L      Potassium 3.9 mmol/L      Chloride 110 (H) mmol/L      CO2 26.0 mmol/L      Calcium 9.1 mg/dL      eGFR Non African Amer 109 mL/min/1.73      BUN/Creatinine Ratio 16.1     Anion Gap 10.0 mmol/L     Narrative:       [employer] Guidelines    Stage     Description        GFR  1         Normal or High     90+  2         Mild decrease      60-89  3         Moderate decrease  30-59  4         Severe decrease    15-29  5         Kidney failure     <15    CBC (No Diff) [[id number]]  (Normal) Collected:  08/08/18 0340    Specimen:  Blood Updated:  08/08/18 0509     WBC 8.72 10*3/mm3      RBC 4.00 10*6/mm3      Hemoglobin 11.5 g/dL      Hematocrit 35.3 %      MCV 88.3 fL      MCH 28.8 pg      MCHC 32.6 g/dL      RDW 12.7 %      RDW-SD 41.1 fl      MPV 10.6 fL      Platelets 238 10*3/mm3     Blood Culture - Blood, [[id number]]  (Normal) Collected:  08/06/18 2221    Specimen:  Blood from Arm, Left Updated:  08/07/18 2246     Blood Culture No growth at 24 hours    Blood Culture - Blood, [[id number]]  (Normal) Collected:  08/06/18 2221    Specimen:  Blood from Arm, Right Updated:  08/07/18 2246     Blood Culture No growth at 24 hours              PHYSICAL EXAM-  Comfortable  cv- rsr  Chest- clear, =  Abd- soft, mild distention, only mild suprapubic pain and pressure today      Assessment and " Plan:  Complex multifocal phlegmon and abscess formation with largest collection about 2 cm in the rectosigmoid without free perforation.  Profound pain with passage of gas and stool upon initial presentation now with significant improvement over the last 24 hours  Discussed with Dr. Zamorano earlier today.  We have coordinated plans for transitioning IV antibiotic therapy to the outpatient setting with plans for interval outpatient office follow-up.  We have discussed anticipated recommendations for resection, hopefully, electively once the acute inflammation and multifocal collections of infection are improved.  We have discussed timeframe.  Proceeding early enough to prevent recurrent complications but after enough time to allow optimization of recurrent difficulties.  We have discussed low anterior resection which may be of increased complexity given the low nature of the infection and inflammation extending down into the mid rectum which could be readily appreciated on digital exam on Tuesday.    I would like to see Mrs. Paris in 1 week.      Jose Antonio Mart MD  08/08/18  8:11 PM

## 2018-08-09 NOTE — DISCHARGE SUMMARY
[facility] Medicine Services  DISCHARGE SUMMARY    Patient Name: Demetria Paris  : 1955  MRN: [id number]    Date of Admission: 2018  Date of Discharge: 2018  Primary Care Physician: Abraham Sousa MD    Consults     Date and Time Order Name Status Description    2018 0801 Inpatient Infectious Diseases Consult Completed     2018 0030 Inpatient Colorectal Surgery Consult Completed         Hospital Course     Presenting Problem:   Proctitis [K62.89]  Proctitis [K62.89]    Active Hospital Problems    Diagnosis Date Noted   • **Rectal abscess [K61.1] 2018   • RLQ abdominal pain [R10.31] 2018   • Diverticulosis of large intestine without hemorrhage [K57.30] 2018   • Hypertension [I10] 2016   • Hypothyroidism [E03.9] 2016   • GERD without esophagitis [K21.9] 2016      Resolved Hospital Problems    Diagnosis Date Noted Date Resolved   No resolved problems to display.      Hospital Course:  Demetria Paris is a 62 y.o. female with PMH significant for HTN, GERD, hypothyroidism and diveritculosis. She was directly admitted to [facility] from PCP's office on 2018 due to a four day history of lower quadrant abdominal pain and fever. Abdominal pain significantly worse with BM or flatus. CT abdomen/pelvis showed severe proctitis with a suspected 2.6cm intramural abscess. Colorectal surgery and infectious disease teams were consulted.     Dr. Zamorano of [facility] followed the patient and she was treated with IV Zosyn. Antibiotics transitioned to Invanz at discharge and MS. Paris will continue outpatient IV antibiotics at discharge.     Dr. Mart of CRS service evaluated Ms. Paris. He felt that her findings were most consistent with complications from diverticulitis with multifocal phlegmon and abscess. Abscess was not amenable to drainage. Dr. Mart anticipates need for elective low anterior resection in the future.     Ms.  Wellington is improved and will discharge home on 8/9/2018.   Follow up with Dr. Mart in 1 week  First Invanz infusion with [facility] on 8/10 at 11am     Day of Discharge     HPI:   Doing well, eating better and bowels are moving. Still having diarrhea but seems to be slowing down some. She is anxious for discharge. No chest pain or dyspnea. Denies nausea/vomiting.     Review of Systems  Gen- No fevers, chills  CV- No chest pain, palpitations  Resp- No cough, dyspnea  GI- No N/V/D, abd pain    Otherwise ROS is negative except as mentioned in the HPI.    Vital Signs:   Temp:  [97.9 °F (36.6 °C)-98.5 °F (36.9 °C)] 98.5 °F (36.9 °C)  Heart Rate:  [66-67] 67  Resp:  [14-16] 16  BP: (116-154)/(75-76) 133/76     Physical Exam:  Constitutional: No acute distress, awake, alert  HENT: NCAT, mucous membranes moist  Respiratory: Clear to auscultation bilaterally, respiratory effort normal   Cardiovascular: RRR, no murmurs, rubs, or gallops, palpable pedal pulses bilaterally  Gastrointestinal: Positive bowel sounds, soft, nontender, nondistended  Musculoskeletal: No bilateral ankle edema  Psychiatric: Appropriate affect, cooperative  Neurologic: Oriented x 3, strength symmetric in all extremities, Cranial Nerves grossly intact to confrontation, speech clear  Skin: No rashes    Pertinent  and/or Most Recent Results       Results from last 7 days  Lab Units 08/08/18  0340 08/07/18  0417 08/06/18  2222 08/06/18  1913   WBC 10*3/mm3 8.72 10.53 13.07*  --    HEMOGLOBIN g/dL 11.5 12.4 13.4  --    HEMATOCRIT % 35.3 37.7 40.2  --    PLATELETS 10*3/mm3 238 252 276  --    SODIUM mmol/L 146 140 141  --    POTASSIUM mmol/L 3.9 3.5 3.5  --    CHLORIDE mmol/L 110* 103 102  --    CO2 mmol/L 26.0 29.0 26.0  --    BUN mg/dL 9 18 16  --    CREATININE mg/dL 0.56* 0.71 0.79 0.80   GLUCOSE mg/dL 124* 115* 131*  --    CALCIUM mg/dL 9.1 9.1 9.9  --        Results from last 7 days  Lab Units 08/07/18  0417 08/06/18  2222   BILIRUBIN mg/dL 0.6 0.6   ALK PHOS  U/L 79 87   ALT (SGPT) U/L 14 16   AST (SGOT) U/L 14 17   PROTIME Seconds  --  9.8   INR   --  0.93   APTT seconds  --  31.3*       Results from last 7 days  Lab Units 08/07/18  0417   HEMOGLOBIN A1C % 6.00*     Brief Urine Lab Results  (Last result in the past 365 days)      Color   Clarity   Blood   Leuk Est   Nitrite   Protein   CREAT   Urine HCG        08/06/18 2100 Yellow Clear Negative Negative Negative Negative             Microbiology Results Abnormal     Procedure Component Value - Date/Time    Blood Culture - Blood, [[id number]]  (Normal) Collected:  08/06/18 2221    Lab Status:  Preliminary result Specimen:  Blood from Arm, Left Updated:  08/08/18 2245     Blood Culture No growth at 2 days    Blood Culture - Blood, [[id number]]  (Normal) Collected:  08/06/18 2221    Lab Status:  Preliminary result Specimen:  Blood from Arm, Right Updated:  08/08/18 2245     Blood Culture No growth at 2 days        Imaging Results (all)     None         Order Current Status    Blood Culture - Blood, Preliminary result    Blood Culture - Blood, Preliminary result        Discharge Details        Discharge Medications      New Medications      Instructions Start Date   acetaminophen 325 MG tablet  Commonly known as:  TYLENOL  Replaces:  acetaminophen 650 MG 8 hr tablet   650 mg, Oral, Every 6 Hours PRN      ertapenem 1 gm/100ml solution IV  Commonly known as:  INVanz   1 g, Intravenous, Every 24 Hours      polyethylene glycol pack packet  Commonly known as:  MIRALAX   17 g, Oral, 2 Times Daily PRN      saccharomyces boulardii 250 MG capsule  Commonly known as:  FLORASTOR   250 mg, Oral, 2 Times Daily         Continue These Medications      Instructions Start Date   aspirin 81 MG EC tablet   81 mg, Oral, Daily      bisoprolol-hydrochlorothiazide 5-6.25 MG per tablet  Commonly known as:  ZIAC   1 tablet, Oral, Daily      calcium carbonate 600 MG tablet  Commonly known as:  OS-HARPER   600 mg, Oral, Nightly      diclofenac 1.3 %  patch patch  Commonly known as:  FLECTOR   1 patch, Topical, 2 Times Daily      estradiol 0.1 MG/24HR patch  Commonly known as:  CLIMARA   0.5 patches, Weekly      ibuprofen 200 MG tablet  Commonly known as:  ADVIL,MOTRIN   600 mg, Oral, Nightly PRN      levothyroxine 175 MCG tablet  Commonly known as:  SYNTHROID, LEVOTHROID   TAKE ONE TABLET BY MOUTH ONCE DAILY      losartan-hydrochlorothiazide 100-25 MG per tablet  Commonly known as:  HYZAAR   1 tablet, Oral, Daily      MULTIVITAMIN ADULT PO   1 tablet, Oral, Daily      omeprazole 40 MG capsule  Commonly known as:  priLOSEC   40 mg, Oral, Daily      pravastatin 40 MG tablet  Commonly known as:  PRAVACHOL   40 mg, Oral, Daily      traMADol 50 MG tablet  Commonly known as:  ULTRAM   50 mg, Oral, Every 6 Hours PRN         Stop These Medications    acetaminophen 650 MG 8 hr tablet  Commonly known as:  TYLENOL  Replaced by:  acetaminophen 325 MG tablet          Discharge Disposition:  Home or Self Care    Discharge Diet:  Diet Instructions     Advance Diet As Tolerated           Discharge Activity:   Activity Instructions     Activity as Tolerated           Code Status/Level of Support:  Code Status and Medical Interventions:   Ordered at: 08/06/18 2208     Level Of Support Discussed With:    Patient     Code Status:    CPR     Medical Interventions (Level of Support Prior to Arrest):    Full     Future Appointments  Date Time Provider Department Center   8/21/2018 11:30 AM Abraham Sousa MD [facility] None     Additional Instructions for the Follow-ups that You Need to Schedule     Discharge Follow-up with Specified Provider: Follow up with Dr. Mart in 2 weeks    As directed      To:  Follow up with Dr. Mart in 2 weeks             Time Spent on Discharge:  35 minutes    Electronically signed by Teresa Gonzalez PA-C, 08/09/18, 11:31 AM.

## 2018-08-09 NOTE — PROGRESS NOTES
Case Management Discharge Note    Final Note: Plan is home with family and receive out patient IV antibiotics at [facility], starting to tomorrow, Friday, 8/10 at 11 am. Pt is aware she is to arrive at 1045. Family will transport.     Destination     No service has been selected for the patient.      Durable Medical Equipment     No service has been selected for the patient.      Dialysis/Infusion     No service has been selected for the patient.      Home Medical Care     No service has been selected for the patient.      Social Care     No service has been selected for the patient.             Final Discharge Disposition Code: 01 - home or self-care

## 2018-08-09 NOTE — PAYOR COMM NOTE
"Demetria Paris (62 y.o. Female)     Date of Birth Social Security Number Address Home Phone MRN    1955  [id number] [place] [address] [phone] [id number]    Catholic Marital Status          Other        Admission Date Admission Type Admitting Provider Attending Provider Department, Room/Bed    18 Elective Denise Corral MD Howard, Gabriela Kirk, MD [facility] 5H, S580/1    Discharge Date Discharge Disposition Discharge Destination         Home or Self Care              Attending Provider:  Denise Corral MD    Allergies:  No Known Allergies    Isolation:  None   Infection:  None   Code Status:  CPR    Ht:  165.1 cm (65\")   Wt:  72 kg (158 lb 12.8 oz)    Admission Cmt:  None   Principal Problem:  Rectal abscess [K61.1]                 Active Insurance as of 2018     Primary Coverage     Payor Plan Insurance Group Employer/Plan Group    [facility] BLUE [facility] [id number]     Payor Plan Address Payor Plan Phone Number Effective From Effective To    [address]  2016     [facility] [id number]       Subscriber Name Subscriber Birth Date Member ID       DEMETRIA PARIS 1955 [id number]                 Emergency Contacts      (Rel.) Home Phone Work Phone Mobile Phone    Hardy Paris (Spouse) -- -- [phone]               Discharge Summary      Teresa Gonzalez PA-C at 2018 11:30 AM              [facility] Services  DISCHARGE SUMMARY    Patient Name: Demetria Paris  : 1955  MRN: [id number]    Date of Admission: 2018  Date of Discharge: 2018  Primary Care Physician: Abraham Sousa MD    Consults     Date and Time Order Name Status Description    2018 0801 Inpatient Infectious Diseases Consult Completed     2018 0030 Inpatient Colorectal Surgery Consult Completed         Hospital Course     Presenting Problem:   Proctitis [K62.89]  Proctitis [K62.89]    Active " Hospital Problems    Diagnosis Date Noted   • **Rectal abscess [K61.1] 08/06/2018   • RLQ abdominal pain [R10.31] 08/06/2018   • Diverticulosis of large intestine without hemorrhage [K57.30] 02/22/2018   • Hypertension [I10] 07/29/2016   • Hypothyroidism [E03.9] 07/29/2016   • GERD without esophagitis [K21.9] 07/29/2016      Resolved Hospital Problems    Diagnosis Date Noted Date Resolved   No resolved problems to display.      Hospital Course:  Demetria Paris is a 62 y.o. female with PMH significant for HTN, GERD, hypothyroidism and diveritculosis. She was directly admitted to [facility] from PCP's office on 8/6/2018 due to a four day history of lower quadrant abdominal pain and fever. Abdominal pain significantly worse with BM or flatus. CT abdomen/pelvis showed severe proctitis with a suspected 2.6cm intramural abscess. Colorectal surgery and infectious disease teams were consulted.     Dr. Zamorano of [facility] followed the patient and she was treated with IV Zosyn. Antibiotics transitioned to Invanz at discharge and MS. Paris will continue outpatient IV antibiotics at discharge.     Dr. Mart of CRS service evaluated Ms. Paris. He felt that her findings were most consistent with complications from diverticulitis with multifocal phlegmon and abscess. Abscess was not amenable to drainage. Dr. Mart anticipates need for elective low anterior resection in the future.     Ms. Paris is improved and will discharge home on 8/9/2018.   Follow up with Dr. Mart in 1 week  First Invanz infusion with [facility] on 8/10 at 11am     Day of Discharge     HPI:   Doing well, eating better and bowels are moving. Still having diarrhea but seems to be slowing down some. She is anxious for discharge. No chest pain or dyspnea. Denies nausea/vomiting.     Review of Systems  Gen- No fevers, chills  CV- No chest pain, palpitations  Resp- No cough, dyspnea  GI- No N/V/D, abd pain    Otherwise ROS is negative except as mentioned in  the HPI.    Vital Signs:   Temp:  [97.9 °F (36.6 °C)-98.5 °F (36.9 °C)] 98.5 °F (36.9 °C)  Heart Rate:  [66-67] 67  Resp:  [14-16] 16  BP: (116-154)/(75-76) 133/76     Physical Exam:  Constitutional: No acute distress, awake, alert  HENT: NCAT, mucous membranes moist  Respiratory: Clear to auscultation bilaterally, respiratory effort normal   Cardiovascular: RRR, no murmurs, rubs, or gallops, palpable pedal pulses bilaterally  Gastrointestinal: Positive bowel sounds, soft, nontender, nondistended  Musculoskeletal: No bilateral ankle edema  Psychiatric: Appropriate affect, cooperative  Neurologic: Oriented x 3, strength symmetric in all extremities, Cranial Nerves grossly intact to confrontation, speech clear  Skin: No rashes    Pertinent  and/or Most Recent Results       Results from last 7 days  Lab Units 08/08/18  0340 08/07/18  0417 08/06/18  2222 08/06/18  1913   WBC 10*3/mm3 8.72 10.53 13.07*  --    HEMOGLOBIN g/dL 11.5 12.4 13.4  --    HEMATOCRIT % 35.3 37.7 40.2  --    PLATELETS 10*3/mm3 238 252 276  --    SODIUM mmol/L 146 140 141  --    POTASSIUM mmol/L 3.9 3.5 3.5  --    CHLORIDE mmol/L 110* 103 102  --    CO2 mmol/L 26.0 29.0 26.0  --    BUN mg/dL 9 18 16  --    CREATININE mg/dL 0.56* 0.71 0.79 0.80   GLUCOSE mg/dL 124* 115* 131*  --    CALCIUM mg/dL 9.1 9.1 9.9  --        Results from last 7 days  Lab Units 08/07/18 0417 08/06/18  2222   BILIRUBIN mg/dL 0.6 0.6   ALK PHOS U/L 79 87   ALT (SGPT) U/L 14 16   AST (SGOT) U/L 14 17   PROTIME Seconds  --  9.8   INR   --  0.93   APTT seconds  --  31.3*       Results from last 7 days  Lab Units 08/07/18 0417   HEMOGLOBIN A1C % 6.00*     Brief Urine Lab Results  (Last result in the past 365 days)      Color   Clarity   Blood   Leuk Est   Nitrite   Protein   CREAT   Urine HCG        08/06/18 2100 Yellow Clear Negative Negative Negative Negative             Microbiology Results Abnormal     Procedure Component Value - Date/Time    Blood Culture - Blood,  [[id number]]  (Normal) Collected:  08/06/18 2221    Lab Status:  Preliminary result Specimen:  Blood from Arm, Left Updated:  08/08/18 2245     Blood Culture No growth at 2 days    Blood Culture - Blood, [[id number]]  (Normal) Collected:  08/06/18 2221    Lab Status:  Preliminary result Specimen:  Blood from Arm, Right Updated:  08/08/18 2245     Blood Culture No growth at 2 days        Imaging Results (all)     None         Order Current Status    Blood Culture - Blood, Preliminary result    Blood Culture - Blood, Preliminary result        Discharge Details        Discharge Medications      New Medications      Instructions Start Date   acetaminophen 325 MG tablet  Commonly known as:  TYLENOL  Replaces:  acetaminophen 650 MG 8 hr tablet   650 mg, Oral, Every 6 Hours PRN      ertapenem 1 gm/100ml solution IV  Commonly known as:  INVanz   1 g, Intravenous, Every 24 Hours      polyethylene glycol pack packet  Commonly known as:  MIRALAX   17 g, Oral, 2 Times Daily PRN      saccharomyces boulardii 250 MG capsule  Commonly known as:  FLORASTOR   250 mg, Oral, 2 Times Daily         Continue These Medications      Instructions Start Date   aspirin 81 MG EC tablet   81 mg, Oral, Daily      bisoprolol-hydrochlorothiazide 5-6.25 MG per tablet  Commonly known as:  ZIAC   1 tablet, Oral, Daily      calcium carbonate 600 MG tablet  Commonly known as:  OS-HARPER   600 mg, Oral, Nightly      diclofenac 1.3 % patch patch  Commonly known as:  FLECTOR   1 patch, Topical, 2 Times Daily      estradiol 0.1 MG/24HR patch  Commonly known as:  CLIMARA   0.5 patches, Weekly      ibuprofen 200 MG tablet  Commonly known as:  ADVIL,MOTRIN   600 mg, Oral, Nightly PRN      levothyroxine 175 MCG tablet  Commonly known as:  SYNTHROID, LEVOTHROID   TAKE ONE TABLET BY MOUTH ONCE DAILY      losartan-hydrochlorothiazide 100-25 MG per tablet  Commonly known as:  HYZAAR   1 tablet, Oral, Daily      MULTIVITAMIN ADULT PO   1 tablet, Oral, Daily       omeprazole 40 MG capsule  Commonly known as:  priLOSEC   40 mg, Oral, Daily      pravastatin 40 MG tablet  Commonly known as:  PRAVACHOL   40 mg, Oral, Daily      traMADol 50 MG tablet  Commonly known as:  ULTRAM   50 mg, Oral, Every 6 Hours PRN         Stop These Medications    acetaminophen 650 MG 8 hr tablet  Commonly known as:  TYLENOL  Replaced by:  acetaminophen 325 MG tablet          Discharge Disposition:  Home or Self Care    Discharge Diet:  Diet Instructions     Advance Diet As Tolerated           Discharge Activity:   Activity Instructions     Activity as Tolerated           Code Status/Level of Support:  Code Status and Medical Interventions:   Ordered at: 08/06/18 2208     Level Of Support Discussed With:    Patient     Code Status:    CPR     Medical Interventions (Level of Support Prior to Arrest):    Full     Future Appointments  Date Time Provider Department Center   8/21/2018 11:30 AM Abraham Sousa MD [facility] None     Additional Instructions for the Follow-ups that You Need to Schedule     Discharge Follow-up with Specified Provider: Follow up with Dr. Mart in 2 weeks    As directed      To:  Follow up with Dr. Mart in 2 weeks             Time Spent on Discharge:  35 minutes    Electronically signed by Teresa Gonzalez PA-C, 08/09/18, 11:31 AM.        Electronically signed by Denise Corral MD at 8/9/2018  1:46 PM

## 2018-08-09 NOTE — PLAN OF CARE
Problem: Infection, Risk/Actual (Adult)  Goal: Infection Prevention/Resolution  Outcome: Ongoing (interventions implemented as appropriate)   08/09/18 0548   Infection, Risk/Actual (Adult)   Infection Prevention/Resolution making progress toward outcome       Problem: Fall Risk (Adult)  Goal: Absence of Fall  Outcome: Ongoing (interventions implemented as appropriate)   08/09/18 0548   Fall Risk (Adult)   Absence of Fall making progress toward outcome       Problem: Pain, Acute (Adult)  Goal: Acceptable Pain Control/Comfort Level  Outcome: Ongoing (interventions implemented as appropriate)   08/09/18 0548   Pain, Acute (Adult)   Acceptable Pain Control/Comfort Level making progress toward outcome       Problem: Patient Care Overview  Goal: Plan of Care Review  Outcome: Ongoing (interventions implemented as appropriate)   08/09/18 0548   Coping/Psychosocial   Plan of Care Reviewed With patient   Plan of Care Review   Progress improving   OTHER   Outcome Summary Pt. slept most of the night, VSS, no c/o pain, & will continue to monitor.

## 2018-08-10 ENCOUNTER — TRANSITIONAL CARE MANAGEMENT TELEPHONE ENCOUNTER (OUTPATIENT)
Dept: INTERNAL MEDICINE | Facility: CLINIC | Age: 63
End: 2018-08-10

## 2018-08-11 LAB
BACTERIA SPEC AEROBE CULT: NORMAL
BACTERIA SPEC AEROBE CULT: NORMAL

## 2018-08-13 ENCOUNTER — APPOINTMENT (OUTPATIENT)
Dept: LAB | Facility: HOSPITAL | Age: 63
End: 2018-08-13

## 2018-08-13 ENCOUNTER — TRANSCRIBE ORDERS (OUTPATIENT)
Dept: LAB | Facility: HOSPITAL | Age: 63
End: 2018-08-13

## 2018-08-13 DIAGNOSIS — K57.20 PERFORATED DIVERTICULUM OF LARGE INTESTINE: Primary | ICD-10-CM

## 2018-08-13 LAB
ALBUMIN SERPL-MCNC: 3.93 G/DL (ref 3.2–4.8)
ALBUMIN/GLOB SERPL: 1.6 G/DL (ref 1.5–2.5)
ALP SERPL-CCNC: 73 U/L (ref 25–100)
ALT SERPL W P-5'-P-CCNC: 19 U/L (ref 7–40)
ANION GAP SERPL CALCULATED.3IONS-SCNC: 6 MMOL/L (ref 3–11)
AST SERPL-CCNC: 19 U/L (ref 0–33)
BASOPHILS # BLD AUTO: 0.02 10*3/MM3 (ref 0–0.2)
BASOPHILS NFR BLD AUTO: 0.3 % (ref 0–1)
BILIRUB SERPL-MCNC: 0.2 MG/DL (ref 0.3–1.2)
BUN BLD-MCNC: 19 MG/DL (ref 9–23)
BUN/CREAT SERPL: 24.4 (ref 7–25)
CALCIUM SPEC-SCNC: 9.6 MG/DL (ref 8.7–10.4)
CHLORIDE SERPL-SCNC: 103 MMOL/L (ref 99–109)
CO2 SERPL-SCNC: 32 MMOL/L (ref 20–31)
CREAT BLD-MCNC: 0.78 MG/DL (ref 0.6–1.3)
CRP SERPL-MCNC: 1.14 MG/DL (ref 0–1)
DEPRECATED RDW RBC AUTO: 40.7 FL (ref 37–54)
EOSINOPHIL # BLD AUTO: 0.12 10*3/MM3 (ref 0–0.3)
EOSINOPHIL NFR BLD AUTO: 1.8 % (ref 0–3)
ERYTHROCYTE [DISTWIDTH] IN BLOOD BY AUTOMATED COUNT: 12.7 % (ref 11.3–14.5)
ERYTHROCYTE [SEDIMENTATION RATE] IN BLOOD: 33 MM/HR (ref 0–30)
GFR SERPL CREATININE-BSD FRML MDRD: 75 ML/MIN/1.73
GLOBULIN UR ELPH-MCNC: 2.5 GM/DL
GLUCOSE BLD-MCNC: 106 MG/DL (ref 70–100)
HCT VFR BLD AUTO: 41.4 % (ref 34.5–44)
HGB BLD-MCNC: 13.4 G/DL (ref 11.5–15.5)
IMM GRANULOCYTES # BLD: 0.02 10*3/MM3 (ref 0–0.03)
IMM GRANULOCYTES NFR BLD: 0.3 % (ref 0–0.6)
LYMPHOCYTES # BLD AUTO: 2.24 10*3/MM3 (ref 0.6–4.8)
LYMPHOCYTES NFR BLD AUTO: 33.1 % (ref 24–44)
MCH RBC QN AUTO: 28.8 PG (ref 27–31)
MCHC RBC AUTO-ENTMCNC: 32.4 G/DL (ref 32–36)
MCV RBC AUTO: 88.8 FL (ref 80–99)
MONOCYTES # BLD AUTO: 0.53 10*3/MM3 (ref 0–1)
MONOCYTES NFR BLD AUTO: 7.8 % (ref 0–12)
NEUTROPHILS # BLD AUTO: 3.84 10*3/MM3 (ref 1.5–8.3)
NEUTROPHILS NFR BLD AUTO: 56.7 % (ref 41–71)
PLATELET # BLD AUTO: 338 10*3/MM3 (ref 150–450)
PMV BLD AUTO: 9.3 FL (ref 6–12)
POTASSIUM BLD-SCNC: 4.2 MMOL/L (ref 3.5–5.5)
PROT SERPL-MCNC: 6.4 G/DL (ref 5.7–8.2)
RBC # BLD AUTO: 4.66 10*6/MM3 (ref 3.89–5.14)
SODIUM BLD-SCNC: 141 MMOL/L (ref 132–146)
WBC NRBC COR # BLD: 6.77 10*3/MM3 (ref 3.5–10.8)

## 2018-08-13 PROCEDURE — 85025 COMPLETE CBC W/AUTO DIFF WBC: CPT | Performed by: INTERNAL MEDICINE

## 2018-08-13 PROCEDURE — 86140 C-REACTIVE PROTEIN: CPT | Performed by: INTERNAL MEDICINE

## 2018-08-13 PROCEDURE — 80053 COMPREHEN METABOLIC PANEL: CPT | Performed by: INTERNAL MEDICINE

## 2018-08-13 PROCEDURE — 36415 COLL VENOUS BLD VENIPUNCTURE: CPT | Performed by: INTERNAL MEDICINE

## 2018-08-16 ENCOUNTER — OFFICE VISIT (OUTPATIENT)
Dept: INTERNAL MEDICINE | Facility: CLINIC | Age: 63
End: 2018-08-16

## 2018-08-16 VITALS
WEIGHT: 161 LBS | HEIGHT: 65 IN | SYSTOLIC BLOOD PRESSURE: 130 MMHG | DIASTOLIC BLOOD PRESSURE: 70 MMHG | HEART RATE: 68 BPM | BODY MASS INDEX: 26.82 KG/M2

## 2018-08-16 DIAGNOSIS — R73.02 GLUCOSE INTOLERANCE (IMPAIRED GLUCOSE TOLERANCE): ICD-10-CM

## 2018-08-16 DIAGNOSIS — I10 ESSENTIAL HYPERTENSION: Primary | ICD-10-CM

## 2018-08-16 DIAGNOSIS — E78.5 ELEVATED LIPIDS: ICD-10-CM

## 2018-08-16 DIAGNOSIS — M25.50 ARTHRALGIA, UNSPECIFIED JOINT: ICD-10-CM

## 2018-08-16 DIAGNOSIS — K61.1 RECTAL ABSCESS: ICD-10-CM

## 2018-08-16 DIAGNOSIS — M25.511 ACUTE PAIN OF RIGHT SHOULDER: ICD-10-CM

## 2018-08-16 DIAGNOSIS — K21.9 GERD WITHOUT ESOPHAGITIS: ICD-10-CM

## 2018-08-16 DIAGNOSIS — G43.109 MIGRAINE WITH AURA AND WITHOUT STATUS MIGRAINOSUS, NOT INTRACTABLE: ICD-10-CM

## 2018-08-16 DIAGNOSIS — K57.30 DIVERTICULOSIS OF LARGE INTESTINE WITHOUT HEMORRHAGE: ICD-10-CM

## 2018-08-16 DIAGNOSIS — E03.8 OTHER SPECIFIED HYPOTHYROIDISM: ICD-10-CM

## 2018-08-16 PROBLEM — R10.31 RLQ ABDOMINAL PAIN: Status: RESOLVED | Noted: 2018-08-06 | Resolved: 2018-08-16

## 2018-08-16 PROCEDURE — 99495 TRANSJ CARE MGMT MOD F2F 14D: CPT | Performed by: INTERNAL MEDICINE

## 2018-08-16 NOTE — PROGRESS NOTES
Patient is a 62 y.o. female who is here for a follow up of recent hospital stay for rectal abscess.  Chief Complaint   Patient presents with   • Follow-up     VANESSA rectal abscess         HPI:    Here for f/u of recent diverticular abscess.  Now has IV Invanz.  No fever or chills.  No diarrhea.  No abdominal cramping.  Appetite is good.  Under stress.      Current outpatient and discharge medications have been reconciled for the patient.  Reviewed by: Abraham Sousa MD    History:    Patient Active Problem List   Diagnosis   • Elevated lipids   • GERD without esophagitis   • Hypertension   • Hypothyroidism   • Glucose intolerance (impaired glucose tolerance)   • Polycythemia   • Migraine with aura   • Joint pain   • Acute pain of right shoulder   • Pre-op examination   • Diverticulosis of large intestine without hemorrhage   • Rectal abscess       Past Medical History:   Diagnosis Date   • Chemical exposure     phenteramine   • Diverticulosis    • Fibroid, uterine    • Joint pain    • Migraine with aura    • Needle stick injury        Past Surgical History:   Procedure Laterality Date   • ABDOMINAL HYSTERECTOMY Bilateral 2007    Removal Of Both Ovaries   • BREAST BIOPSY Left    • BREAST BIOPSY Right    • BREAST EXCISIONAL BIOPSY     • COLONOSCOPY  02/14/2018   • GASTRIC BYPASS     • INCISIONAL BREAST BIOPSY Left 1998   • OOPHORECTOMY     • TOTAL KNEE ARTHROPLASTY Right    • TUBAL ABDOMINAL LIGATION  1980       Current Outpatient Prescriptions on File Prior to Visit   Medication Sig   • acetaminophen (TYLENOL) 325 MG tablet Take 2 tablets by mouth Every 6 (Six) Hours As Needed for Mild Pain .   • aspirin 81 MG EC tablet Take 81 mg by mouth Daily.   • bisoprolol-hydrochlorothiazide (ZIAC) 5-6.25 MG per tablet Take 1 tablet by mouth Daily.   • calcium carbonate (OS-HARPER) 600 MG tablet Take 600 mg by mouth Every Night.   • diclofenac (FLECTOR) 1.3 % patch patch Apply 1 patch topically 2 (Two) Times a Day.   • estradiol  (CLIMARA) 0.1 MG/24HR patch 0.5 patches 1 (One) Time Per Week.   • ibuprofen (ADVIL,MOTRIN) 200 MG tablet Take 600 mg by mouth At Night As Needed for Mild Pain .   • levothyroxine (SYNTHROID, LEVOTHROID) 175 MCG tablet TAKE ONE TABLET BY MOUTH ONCE DAILY   • losartan-hydrochlorothiazide (HYZAAR) 100-25 MG per tablet Take 1 tablet by mouth Daily.   • Multiple Vitamins-Minerals (MULTIVITAMIN ADULT PO) Take 1 tablet by mouth Daily.   • omeprazole (priLOSEC) 40 MG capsule Take 1 capsule by mouth Daily.   • pravastatin (PRAVACHOL) 40 MG tablet Take 1 tablet by mouth Daily.   • saccharomyces boulardii (FLORASTOR) 250 MG capsule Take 1 capsule by mouth 2 (Two) Times a Day.   • traMADol (ULTRAM) 50 MG tablet Take 1 tablet by mouth Every 6 (Six) Hours As Needed for Moderate Pain .   • [DISCONTINUED] polyethylene glycol (MIRALAX) pack packet Take 17 g by mouth 2 (Two) Times a Day As Needed (constipation).     No current facility-administered medications on file prior to visit.        Family History   Problem Relation Age of Onset   • Heart disease Other    • Diabetes Other    • Stroke Other    • Breast cancer Other    • Breast cancer Paternal Aunt 50   • Diabetes Maternal Grandmother    • Stroke Maternal Grandfather    • Heart disease Paternal Grandfather        Social History     Social History   • Marital status:      Spouse name: N/A   • Number of children: N/A   • Years of education: N/A     Occupational History   • Not on file.     Social History Main Topics   • Smoking status: Former Smoker   • Smokeless tobacco: Not on file   • Alcohol use Not on file   • Drug use: No   • Sexual activity: Defer     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          Review of Systems   Constitutional: Negative for diaphoresis, fatigue and unexpected weight change.   HENT: Negative for congestion, ear pain, facial swelling, hearing loss, nosebleeds, postnasal drip,  "sinus pressure and sore throat.    Eyes: Negative for pain, discharge and itching.   Respiratory: Negative for cough, chest tightness, shortness of breath and wheezing.    Cardiovascular: Negative for chest pain, palpitations and leg swelling.   Gastrointestinal: Negative for abdominal distention, blood in stool, diarrhea, nausea and vomiting.        2/18 colonoscopy without polyps, next 2028   Endocrine: Negative for polydipsia and polyuria.   Genitourinary: Negative for difficulty urinating, dysuria, frequency and hematuria.        2/18 mammogram   Musculoskeletal: Positive for arthralgias. Negative for back pain, gait problem, joint swelling, myalgias and neck pain.   Skin: Negative for rash and wound.   Neurological: Negative for dizziness, syncope, weakness and headaches.   Psychiatric/Behavioral: Negative for dysphoric mood and sleep disturbance. The patient is not nervous/anxious.        /70   Pulse 68   Ht 165.1 cm (65\")   Wt 73 kg (161 lb)   LMP  (LMP Unknown)   BMI 26.79 kg/m²       Physical Exam   Constitutional: She is oriented to person, place, and time. She appears well-developed and well-nourished.   HENT:   Head: Normocephalic and atraumatic.   Right Ear: External ear normal.   Left Ear: External ear normal.   Mouth/Throat: Oropharynx is clear and moist.   Eyes: Conjunctivae and EOM are normal.   Neck: Normal range of motion. Neck supple.   Cardiovascular: Normal rate, regular rhythm and normal heart sounds.    Pulmonary/Chest: Effort normal and breath sounds normal.   Abdominal: Soft. Bowel sounds are normal.   Musculoskeletal: Normal range of motion.   Lymphadenopathy:     She has no cervical adenopathy.   Neurological: She is alert and oriented to person, place, and time.   Skin: Skin is warm and dry.   Psychiatric: She has a normal mood and affect. Her behavior is normal. Thought content normal.       Procedure:    This patient care was coordinated using transitional care management " strategy. I have reviewed The discharge records are available and reviewed    Discussion/Summary:    hyperlipidemia-labs reviewed, counseled on diet  htn-advised low NA and exercise  hypothryoid-lab noted  gerd-stable  Diverticular abscess-f/u ID and CRS  high risk meds- labs noted      labs noted and dw patient    Current Outpatient Prescriptions:   •  acetaminophen (TYLENOL) 325 MG tablet, Take 2 tablets by mouth Every 6 (Six) Hours As Needed for Mild Pain ., Disp: , Rfl:   •  aspirin 81 MG EC tablet, Take 81 mg by mouth Daily., Disp: , Rfl:   •  bisoprolol-hydrochlorothiazide (ZIAC) 5-6.25 MG per tablet, Take 1 tablet by mouth Daily., Disp: 90 tablet, Rfl: 3  •  calcium carbonate (OS-HARPER) 600 MG tablet, Take 600 mg by mouth Every Night., Disp: , Rfl:   •  diclofenac (FLECTOR) 1.3 % patch patch, Apply 1 patch topically 2 (Two) Times a Day., Disp: 60 patch, Rfl: 2  •  estradiol (CLIMARA) 0.1 MG/24HR patch, 0.5 patches 1 (One) Time Per Week., Disp: , Rfl:   •  ibuprofen (ADVIL,MOTRIN) 200 MG tablet, Take 600 mg by mouth At Night As Needed for Mild Pain ., Disp: , Rfl:   •  levothyroxine (SYNTHROID, LEVOTHROID) 175 MCG tablet, TAKE ONE TABLET BY MOUTH ONCE DAILY, Disp: 90 tablet, Rfl: 1  •  losartan-hydrochlorothiazide (HYZAAR) 100-25 MG per tablet, Take 1 tablet by mouth Daily., Disp: 90 tablet, Rfl: 3  •  Multiple Vitamins-Minerals (MULTIVITAMIN ADULT PO), Take 1 tablet by mouth Daily., Disp: , Rfl:   •  omeprazole (priLOSEC) 40 MG capsule, Take 1 capsule by mouth Daily., Disp: 90 capsule, Rfl: 3  •  pravastatin (PRAVACHOL) 40 MG tablet, Take 1 tablet by mouth Daily., Disp: 90 tablet, Rfl: 3  •  saccharomyces boulardii (FLORASTOR) 250 MG capsule, Take 1 capsule by mouth 2 (Two) Times a Day., Disp: 60 capsule, Rfl: 1  •  traMADol (ULTRAM) 50 MG tablet, Take 1 tablet by mouth Every 6 (Six) Hours As Needed for Moderate Pain ., Disp: 120 tablet, Rfl: 2        Demetria was seen today for follow-up.    Diagnoses and all  orders for this visit:    Essential hypertension    Migraine with aura and without status migrainosus, not intractable    Diverticulosis of large intestine without hemorrhage    GERD without esophagitis    Rectal abscess    Glucose intolerance (impaired glucose tolerance)    Other specified hypothyroidism    Acute pain of right shoulder    Arthralgia, unspecified joint    Elevated lipids

## 2018-08-20 ENCOUNTER — TRANSCRIBE ORDERS (OUTPATIENT)
Dept: LAB | Facility: HOSPITAL | Age: 63
End: 2018-08-20

## 2018-08-20 ENCOUNTER — LAB (OUTPATIENT)
Dept: LAB | Facility: HOSPITAL | Age: 63
End: 2018-08-20

## 2018-08-20 DIAGNOSIS — K57.20 PERFORATED DIVERTICULUM OF LARGE INTESTINE: ICD-10-CM

## 2018-08-20 DIAGNOSIS — K57.20 PERFORATED DIVERTICULUM OF LARGE INTESTINE: Primary | ICD-10-CM

## 2018-08-20 LAB
ALBUMIN SERPL-MCNC: 4.06 G/DL (ref 3.2–4.8)
ALBUMIN/GLOB SERPL: 1.7 G/DL (ref 1.5–2.5)
ALP SERPL-CCNC: 90 U/L (ref 25–100)
ALT SERPL W P-5'-P-CCNC: 18 U/L (ref 7–40)
ANION GAP SERPL CALCULATED.3IONS-SCNC: 7 MMOL/L (ref 3–11)
AST SERPL-CCNC: 18 U/L (ref 0–33)
BASOPHILS # BLD AUTO: 0.03 10*3/MM3 (ref 0–0.2)
BASOPHILS NFR BLD AUTO: 0.3 % (ref 0–1)
BILIRUB SERPL-MCNC: 0.3 MG/DL (ref 0.3–1.2)
BUN BLD-MCNC: 21 MG/DL (ref 9–23)
BUN/CREAT SERPL: 28 (ref 7–25)
CALCIUM SPEC-SCNC: 9.7 MG/DL (ref 8.7–10.4)
CHLORIDE SERPL-SCNC: 103 MMOL/L (ref 99–109)
CO2 SERPL-SCNC: 31 MMOL/L (ref 20–31)
CREAT BLD-MCNC: 0.75 MG/DL (ref 0.6–1.3)
CRP SERPL-MCNC: 0.46 MG/DL (ref 0–1)
DEPRECATED RDW RBC AUTO: 42 FL (ref 37–54)
EOSINOPHIL # BLD AUTO: 0.08 10*3/MM3 (ref 0–0.3)
EOSINOPHIL NFR BLD AUTO: 0.9 % (ref 0–3)
ERYTHROCYTE [DISTWIDTH] IN BLOOD BY AUTOMATED COUNT: 13.2 % (ref 11.3–14.5)
ERYTHROCYTE [SEDIMENTATION RATE] IN BLOOD: 14 MM/HR (ref 0–30)
GFR SERPL CREATININE-BSD FRML MDRD: 78 ML/MIN/1.73
GLOBULIN UR ELPH-MCNC: 2.3 GM/DL
GLUCOSE BLD-MCNC: 105 MG/DL (ref 70–100)
HCT VFR BLD AUTO: 40.4 % (ref 34.5–44)
HGB BLD-MCNC: 13.3 G/DL (ref 11.5–15.5)
IMM GRANULOCYTES # BLD: 0.02 10*3/MM3 (ref 0–0.03)
IMM GRANULOCYTES NFR BLD: 0.2 % (ref 0–0.6)
LYMPHOCYTES # BLD AUTO: 1.81 10*3/MM3 (ref 0.6–4.8)
LYMPHOCYTES NFR BLD AUTO: 20 % (ref 24–44)
MCH RBC QN AUTO: 28.7 PG (ref 27–31)
MCHC RBC AUTO-ENTMCNC: 32.9 G/DL (ref 32–36)
MCV RBC AUTO: 87.3 FL (ref 80–99)
MONOCYTES # BLD AUTO: 0.42 10*3/MM3 (ref 0–1)
MONOCYTES NFR BLD AUTO: 4.7 % (ref 0–12)
NEUTROPHILS # BLD AUTO: 6.69 10*3/MM3 (ref 1.5–8.3)
NEUTROPHILS NFR BLD AUTO: 74.1 % (ref 41–71)
PLATELET # BLD AUTO: 273 10*3/MM3 (ref 150–450)
PMV BLD AUTO: 10.3 FL (ref 6–12)
POTASSIUM BLD-SCNC: 4 MMOL/L (ref 3.5–5.5)
PROT SERPL-MCNC: 6.4 G/DL (ref 5.7–8.2)
RBC # BLD AUTO: 4.63 10*6/MM3 (ref 3.89–5.14)
SODIUM BLD-SCNC: 141 MMOL/L (ref 132–146)
WBC NRBC COR # BLD: 9.03 10*3/MM3 (ref 3.5–10.8)

## 2018-08-20 PROCEDURE — 85025 COMPLETE CBC W/AUTO DIFF WBC: CPT

## 2018-08-20 PROCEDURE — 36415 COLL VENOUS BLD VENIPUNCTURE: CPT

## 2018-08-20 PROCEDURE — 80053 COMPREHEN METABOLIC PANEL: CPT

## 2018-08-20 PROCEDURE — 86140 C-REACTIVE PROTEIN: CPT

## 2018-08-21 ENCOUNTER — TRANSCRIBE ORDERS (OUTPATIENT)
Dept: ADMINISTRATIVE | Facility: HOSPITAL | Age: 63
End: 2018-08-21

## 2018-08-21 DIAGNOSIS — K57.20 DIVERTICULITIS OF LARGE INTESTINE WITH PERFORATION AND ABSCESS WITHOUT BLEEDING: Primary | ICD-10-CM

## 2018-08-22 ENCOUNTER — HOSPITAL ENCOUNTER (OUTPATIENT)
Dept: CT IMAGING | Facility: HOSPITAL | Age: 63
Discharge: HOME | End: 2018-08-22
Attending: INTERNAL MEDICINE | Admitting: INTERNAL MEDICINE

## 2018-08-22 DIAGNOSIS — K57.20 DIVERTICULITIS OF LARGE INTESTINE WITH PERFORATION AND ABSCESS WITHOUT BLEEDING: ICD-10-CM

## 2018-08-22 PROCEDURE — 25010000002 IOPAMIDOL 61 % SOLUTION: Performed by: INTERNAL MEDICINE

## 2018-08-22 PROCEDURE — 74177 CT ABD & PELVIS W/CONTRAST: CPT

## 2018-08-22 RX ADMIN — BARIUM SULFATE 450 ML: 21 SUSPENSION ORAL at 08:30

## 2018-08-22 RX ADMIN — IOPAMIDOL 80 ML: 612 INJECTION, SOLUTION INTRAVENOUS at 09:45

## 2018-09-17 DIAGNOSIS — M15.9 PRIMARY OSTEOARTHRITIS INVOLVING MULTIPLE JOINTS: ICD-10-CM

## 2018-09-17 RX ORDER — TRAMADOL HYDROCHLORIDE 50 MG/1
50 TABLET ORAL EVERY 6 HOURS PRN
Qty: 120 TABLET | Refills: 2 | Status: SHIPPED | OUTPATIENT
Start: 2018-09-17 | End: 2018-11-28 | Stop reason: SDUPTHER

## 2018-09-21 DIAGNOSIS — I10 ESSENTIAL HYPERTENSION: ICD-10-CM

## 2018-09-21 RX ORDER — LOSARTAN POTASSIUM AND HYDROCHLOROTHIAZIDE 25; 100 MG/1; MG/1
1 TABLET ORAL DAILY
Qty: 90 TABLET | Refills: 3 | Status: SHIPPED | OUTPATIENT
Start: 2018-09-21 | End: 2019-09-24 | Stop reason: SDUPTHER

## 2018-11-05 DIAGNOSIS — K21.9 GERD WITHOUT ESOPHAGITIS: ICD-10-CM

## 2018-11-05 DIAGNOSIS — E03.8 OTHER SPECIFIED HYPOTHYROIDISM: ICD-10-CM

## 2018-11-05 RX ORDER — LEVOTHYROXINE SODIUM 175 UG/1
175 TABLET ORAL DAILY
Qty: 90 TABLET | Refills: 1 | Status: SHIPPED | OUTPATIENT
Start: 2018-11-05 | End: 2019-04-01 | Stop reason: SDUPTHER

## 2018-11-05 RX ORDER — OMEPRAZOLE 40 MG/1
40 CAPSULE, DELAYED RELEASE ORAL DAILY
Qty: 90 CAPSULE | Refills: 3 | Status: SHIPPED | OUTPATIENT
Start: 2018-11-05 | End: 2019-11-18 | Stop reason: SDUPTHER

## 2018-11-06 DIAGNOSIS — E78.5 ELEVATED LIPIDS: ICD-10-CM

## 2018-11-06 RX ORDER — PRAVASTATIN SODIUM 40 MG
40 TABLET ORAL DAILY
Qty: 90 TABLET | Refills: 3 | Status: SHIPPED | OUTPATIENT
Start: 2018-11-06 | End: 2019-08-01

## 2018-11-28 ENCOUNTER — OFFICE VISIT (OUTPATIENT)
Dept: INTERNAL MEDICINE | Facility: CLINIC | Age: 63
End: 2018-11-28

## 2018-11-28 VITALS
BODY MASS INDEX: 27.32 KG/M2 | DIASTOLIC BLOOD PRESSURE: 70 MMHG | HEIGHT: 65 IN | SYSTOLIC BLOOD PRESSURE: 140 MMHG | HEART RATE: 68 BPM | WEIGHT: 164 LBS

## 2018-11-28 DIAGNOSIS — G43.109 MIGRAINE WITH AURA AND WITHOUT STATUS MIGRAINOSUS, NOT INTRACTABLE: Primary | ICD-10-CM

## 2018-11-28 DIAGNOSIS — K57.30 DIVERTICULOSIS OF LARGE INTESTINE WITHOUT HEMORRHAGE: ICD-10-CM

## 2018-11-28 DIAGNOSIS — R73.02 GLUCOSE INTOLERANCE (IMPAIRED GLUCOSE TOLERANCE): ICD-10-CM

## 2018-11-28 DIAGNOSIS — E03.8 OTHER SPECIFIED HYPOTHYROIDISM: ICD-10-CM

## 2018-11-28 DIAGNOSIS — K21.9 GERD WITHOUT ESOPHAGITIS: ICD-10-CM

## 2018-11-28 DIAGNOSIS — I10 ESSENTIAL HYPERTENSION: ICD-10-CM

## 2018-11-28 DIAGNOSIS — M15.9 PRIMARY OSTEOARTHRITIS INVOLVING MULTIPLE JOINTS: ICD-10-CM

## 2018-11-28 DIAGNOSIS — E78.5 ELEVATED LIPIDS: ICD-10-CM

## 2018-11-28 PROBLEM — K61.1 RECTAL ABSCESS: Status: RESOLVED | Noted: 2018-08-06 | Resolved: 2018-11-28

## 2018-11-28 PROCEDURE — 99214 OFFICE O/P EST MOD 30 MIN: CPT | Performed by: INTERNAL MEDICINE

## 2018-11-28 RX ORDER — TRAMADOL HYDROCHLORIDE 50 MG/1
50 TABLET ORAL EVERY 6 HOURS PRN
Qty: 120 TABLET | Refills: 2 | Status: SHIPPED | OUTPATIENT
Start: 2018-11-28 | End: 2019-03-19 | Stop reason: SDUPTHER

## 2018-11-28 NOTE — PROGRESS NOTES
Patient is a 63 y.o. female who is here for a follow up of chronic conditions.  Chief Complaint   Patient presents with   • Hypertension   • Hypothyroidism         HPI:    Here for f/u.  No further abdominal pains.  No fever or chills.  Increasing fiber.  No diarrhea or constipation.  Attempting to drink more fluids.  GERD is controlled.  No dizziness or lightheadedness.      History:    Patient Active Problem List   Diagnosis   • Elevated lipids   • GERD without esophagitis   • Hypertension   • Hypothyroidism   • Glucose intolerance (impaired glucose tolerance)   • Polycythemia   • Migraine with aura   • Joint pain   • Acute pain of right shoulder   • Pre-op examination   • Diverticulosis of large intestine without hemorrhage       Past Medical History:   Diagnosis Date   • Chemical exposure     phenteramine   • Diverticulosis    • Fibroid, uterine    • Joint pain    • Migraine with aura    • Needle stick injury        Past Surgical History:   Procedure Laterality Date   • ABDOMINAL HYSTERECTOMY Bilateral 2007    Removal Of Both Ovaries   • BREAST BIOPSY Left    • BREAST BIOPSY Right    • BREAST EXCISIONAL BIOPSY     • COLONOSCOPY  02/14/2018   • GASTRIC BYPASS     • INCISIONAL BREAST BIOPSY Left 1998   • OOPHORECTOMY     • TOTAL KNEE ARTHROPLASTY Right    • TUBAL ABDOMINAL LIGATION  1980       Current Outpatient Medications on File Prior to Visit   Medication Sig   • acetaminophen (TYLENOL) 325 MG tablet Take 2 tablets by mouth Every 6 (Six) Hours As Needed for Mild Pain .   • aspirin 81 MG EC tablet Take 81 mg by mouth Daily.   • bisoprolol-hydrochlorothiazide (ZIAC) 5-6.25 MG per tablet Take 1 tablet by mouth Daily.   • calcium carbonate (OS-HARPER) 600 MG tablet Take 600 mg by mouth Every Night.   • diclofenac (FLECTOR) 1.3 % patch patch Apply 1 patch topically 2 (Two) Times a Day.   • estradiol (CLIMARA) 0.1 MG/24HR patch 0.5 patches 1 (One) Time Per Week.   • ibuprofen (ADVIL,MOTRIN) 200 MG tablet Take 600 mg by  mouth At Night As Needed for Mild Pain .   • levothyroxine (SYNTHROID, LEVOTHROID) 175 MCG tablet Take 1 tablet by mouth Daily.   • losartan-hydrochlorothiazide (HYZAAR) 100-25 MG per tablet Take 1 tablet by mouth Daily.   • Multiple Vitamins-Minerals (MULTIVITAMIN ADULT PO) Take 1 tablet by mouth Daily.   • omeprazole (priLOSEC) 40 MG capsule Take 1 capsule by mouth Daily.   • pravastatin (PRAVACHOL) 40 MG tablet Take 1 tablet by mouth Daily.   • saccharomyces boulardii (FLORASTOR) 250 MG capsule Take 1 capsule by mouth 2 (Two) Times a Day.   • [DISCONTINUED] traMADol (ULTRAM) 50 MG tablet Take 1 tablet by mouth Every 6 (Six) Hours As Needed for Moderate Pain .     No current facility-administered medications on file prior to visit.        Family History   Problem Relation Age of Onset   • Heart disease Other    • Diabetes Other    • Stroke Other    • Breast cancer Other    • Breast cancer Paternal Aunt 50   • Diabetes Maternal Grandmother    • Stroke Maternal Grandfather    • Heart disease Paternal Grandfather        Social History     Socioeconomic History   • Marital status:      Spouse name: Not on file   • Number of children: Not on file   • Years of education: Not on file   • Highest education level: Not on file   Social Needs   • Financial resource strain: Not on file   • Food insecurity - worry: Not on file   • Food insecurity - inability: Not on file   • Transportation needs - medical: Not on file   • Transportation needs - non-medical: Not on file   Occupational History   • Not on file   Tobacco Use   • Smoking status: Former Smoker   Substance and Sexual Activity   • Alcohol use: Not on file   • Drug use: No   • Sexual activity: Defer   Other Topics Concern   • Not on file   Social History Narrative    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          Review of Systems   Constitutional: Negative for diaphoresis, fatigue and unexpected weight change.   HENT: Negative for congestion, ear pain, facial  "swelling, hearing loss, nosebleeds, postnasal drip, sinus pressure and sore throat.    Eyes: Negative for pain, discharge and itching.   Respiratory: Negative for cough, chest tightness, shortness of breath and wheezing.    Cardiovascular: Negative for chest pain, palpitations and leg swelling.   Gastrointestinal: Negative for abdominal distention, blood in stool, diarrhea, nausea and vomiting.        2/18 colonoscopy without polyps, next 2028   Endocrine: Negative for polydipsia and polyuria.   Genitourinary: Negative for difficulty urinating, dysuria, frequency and hematuria.        2/18 mammogram   Musculoskeletal: Positive for arthralgias. Negative for back pain, gait problem, joint swelling, myalgias and neck pain.   Skin: Negative for rash and wound.   Neurological: Negative for dizziness, syncope, weakness and headaches.   Psychiatric/Behavioral: Negative for dysphoric mood and sleep disturbance. The patient is not nervous/anxious.        /70   Pulse 68   Ht 165.1 cm (65\")   Wt 74.4 kg (164 lb)   LMP  (LMP Unknown)   BMI 27.29 kg/m²       Physical Exam   Constitutional: She is oriented to person, place, and time. She appears well-developed and well-nourished.   HENT:   Head: Normocephalic and atraumatic.   Right Ear: External ear normal.   Left Ear: External ear normal.   Mouth/Throat: Oropharynx is clear and moist.   Eyes: Conjunctivae and EOM are normal.   Neck: Normal range of motion. Neck supple.   Cardiovascular: Normal rate, regular rhythm and normal heart sounds.   Pulmonary/Chest: Effort normal and breath sounds normal.   Abdominal: Soft. Bowel sounds are normal.   Musculoskeletal: Normal range of motion.   Lymphadenopathy:     She has no cervical adenopathy.   Neurological: She is alert and oriented to person, place, and time.   Skin: Skin is warm and dry.   Psychiatric: She has a normal mood and affect. Her behavior is normal. Thought content normal. "       Procedure:      Discussion/Summary:    hyperlipidemia-labs reviewed, counseled on diet  htn-advised low NA and exercise  hypothryoid-lab on rtc  gerd-stable  Diverticular abscess-f/u CRS  high risk meds- labs noted      labs noted and dw patient, advised flu shot and Hep A        Current Outpatient Medications:   •  acetaminophen (TYLENOL) 325 MG tablet, Take 2 tablets by mouth Every 6 (Six) Hours As Needed for Mild Pain ., Disp: , Rfl:   •  aspirin 81 MG EC tablet, Take 81 mg by mouth Daily., Disp: , Rfl:   •  bisoprolol-hydrochlorothiazide (ZIAC) 5-6.25 MG per tablet, Take 1 tablet by mouth Daily., Disp: 90 tablet, Rfl: 3  •  calcium carbonate (OS-HARPER) 600 MG tablet, Take 600 mg by mouth Every Night., Disp: , Rfl:   •  diclofenac (FLECTOR) 1.3 % patch patch, Apply 1 patch topically 2 (Two) Times a Day., Disp: 60 patch, Rfl: 2  •  estradiol (CLIMARA) 0.1 MG/24HR patch, 0.5 patches 1 (One) Time Per Week., Disp: , Rfl:   •  ibuprofen (ADVIL,MOTRIN) 200 MG tablet, Take 600 mg by mouth At Night As Needed for Mild Pain ., Disp: , Rfl:   •  levothyroxine (SYNTHROID, LEVOTHROID) 175 MCG tablet, Take 1 tablet by mouth Daily., Disp: 90 tablet, Rfl: 1  •  losartan-hydrochlorothiazide (HYZAAR) 100-25 MG per tablet, Take 1 tablet by mouth Daily., Disp: 90 tablet, Rfl: 3  •  Multiple Vitamins-Minerals (MULTIVITAMIN ADULT PO), Take 1 tablet by mouth Daily., Disp: , Rfl:   •  omeprazole (priLOSEC) 40 MG capsule, Take 1 capsule by mouth Daily., Disp: 90 capsule, Rfl: 3  •  pravastatin (PRAVACHOL) 40 MG tablet, Take 1 tablet by mouth Daily., Disp: 90 tablet, Rfl: 3  •  saccharomyces boulardii (FLORASTOR) 250 MG capsule, Take 1 capsule by mouth 2 (Two) Times a Day., Disp: 60 capsule, Rfl: 1  •  traMADol (ULTRAM) 50 MG tablet, Take 1 tablet by mouth Every 6 (Six) Hours As Needed for Moderate Pain ., Disp: 120 tablet, Rfl: 2        Demetria was seen today for hypertension and hypothyroidism.    Diagnoses and all orders for this  visit:    Migraine with aura and without status migrainosus, not intractable    Essential hypertension    GERD without esophagitis    Diverticulosis of large intestine without hemorrhage    Other specified hypothyroidism    Glucose intolerance (impaired glucose tolerance)    Elevated lipids    Primary osteoarthritis involving multiple joints  -     traMADol (ULTRAM) 50 MG tablet; Take 1 tablet by mouth Every 6 (Six) Hours As Needed for Moderate Pain .

## 2018-12-20 ENCOUNTER — TRANSCRIBE ORDERS (OUTPATIENT)
Dept: ADMINISTRATIVE | Facility: HOSPITAL | Age: 63
End: 2018-12-20

## 2018-12-20 DIAGNOSIS — Z12.31 VISIT FOR SCREENING MAMMOGRAM: Primary | ICD-10-CM

## 2019-01-16 DIAGNOSIS — I10 ESSENTIAL HYPERTENSION: ICD-10-CM

## 2019-01-16 RX ORDER — BISOPROLOL FUMARATE AND HYDROCHLOROTHIAZIDE 5; 6.25 MG/1; MG/1
1 TABLET ORAL DAILY
Qty: 90 TABLET | Refills: 3 | Status: SHIPPED | OUTPATIENT
Start: 2019-01-16 | End: 2019-07-19 | Stop reason: SDUPTHER

## 2019-03-04 ENCOUNTER — HOSPITAL ENCOUNTER (OUTPATIENT)
Dept: MAMMOGRAPHY | Facility: HOSPITAL | Age: 64
Discharge: HOME | End: 2019-03-04
Admitting: NURSE PRACTITIONER

## 2019-03-04 DIAGNOSIS — Z12.31 VISIT FOR SCREENING MAMMOGRAM: ICD-10-CM

## 2019-03-04 PROCEDURE — 77067 SCR MAMMO BI INCL CAD: CPT | Performed by: RADIOLOGY

## 2019-03-04 PROCEDURE — 77063 BREAST TOMOSYNTHESIS BI: CPT | Performed by: RADIOLOGY

## 2019-03-04 PROCEDURE — 77067 SCR MAMMO BI INCL CAD: CPT

## 2019-03-04 PROCEDURE — 77063 BREAST TOMOSYNTHESIS BI: CPT

## 2019-03-19 DIAGNOSIS — M15.9 PRIMARY OSTEOARTHRITIS INVOLVING MULTIPLE JOINTS: ICD-10-CM

## 2019-03-19 RX ORDER — TRAMADOL HYDROCHLORIDE 50 MG/1
50 TABLET ORAL EVERY 6 HOURS PRN
Qty: 120 TABLET | Refills: 2 | Status: SHIPPED | OUTPATIENT
Start: 2019-03-19 | End: 2019-08-21 | Stop reason: SDUPTHER

## 2019-04-01 ENCOUNTER — OFFICE VISIT (OUTPATIENT)
Dept: INTERNAL MEDICINE | Facility: CLINIC | Age: 64
End: 2019-04-01

## 2019-04-01 VITALS
HEART RATE: 68 BPM | HEIGHT: 65 IN | SYSTOLIC BLOOD PRESSURE: 130 MMHG | WEIGHT: 172 LBS | BODY MASS INDEX: 28.66 KG/M2 | DIASTOLIC BLOOD PRESSURE: 78 MMHG

## 2019-04-01 DIAGNOSIS — K21.9 GERD WITHOUT ESOPHAGITIS: ICD-10-CM

## 2019-04-01 DIAGNOSIS — R73.02 GLUCOSE INTOLERANCE (IMPAIRED GLUCOSE TOLERANCE): ICD-10-CM

## 2019-04-01 DIAGNOSIS — I10 ESSENTIAL HYPERTENSION: Primary | ICD-10-CM

## 2019-04-01 DIAGNOSIS — D75.1 POLYCYTHEMIA: ICD-10-CM

## 2019-04-01 DIAGNOSIS — E78.5 ELEVATED LIPIDS: ICD-10-CM

## 2019-04-01 DIAGNOSIS — F51.01 PRIMARY INSOMNIA: ICD-10-CM

## 2019-04-01 DIAGNOSIS — E03.8 OTHER SPECIFIED HYPOTHYROIDISM: ICD-10-CM

## 2019-04-01 DIAGNOSIS — K57.30 DIVERTICULOSIS OF LARGE INTESTINE WITHOUT HEMORRHAGE: ICD-10-CM

## 2019-04-01 LAB
ALBUMIN SERPL-MCNC: 4.14 G/DL (ref 3.2–4.8)
ALBUMIN/GLOB SERPL: 2.2 G/DL (ref 1.5–2.5)
ALP SERPL-CCNC: 72 U/L (ref 25–100)
ALT SERPL W P-5'-P-CCNC: 21 U/L (ref 7–40)
ANION GAP SERPL CALCULATED.3IONS-SCNC: 4 MMOL/L (ref 3–11)
ARTICHOKE IGE QN: 97 MG/DL (ref 0–130)
AST SERPL-CCNC: 18 U/L (ref 0–33)
BILIRUB SERPL-MCNC: 0.6 MG/DL (ref 0.3–1.2)
BUN BLD-MCNC: 27 MG/DL (ref 9–23)
BUN/CREAT SERPL: 32.9 (ref 7–25)
CALCIUM SPEC-SCNC: 10.3 MG/DL (ref 8.7–10.4)
CHLORIDE SERPL-SCNC: 106 MMOL/L (ref 99–109)
CHOLEST SERPL-MCNC: 181 MG/DL (ref 0–200)
CO2 SERPL-SCNC: 32 MMOL/L (ref 20–31)
CREAT BLD-MCNC: 0.82 MG/DL (ref 0.6–1.3)
DEPRECATED RDW RBC AUTO: 45.1 FL (ref 37–54)
ERYTHROCYTE [DISTWIDTH] IN BLOOD BY AUTOMATED COUNT: 13.9 % (ref 11.3–14.5)
GFR SERPL CREATININE-BSD FRML MDRD: 70 ML/MIN/1.73
GLOBULIN UR ELPH-MCNC: 1.9 GM/DL
GLUCOSE BLD-MCNC: 122 MG/DL (ref 70–100)
HBA1C MFR BLD: 5.8 % (ref 4.8–5.6)
HCT VFR BLD AUTO: 41.7 % (ref 34.5–44)
HDLC SERPL-MCNC: 64 MG/DL (ref 40–60)
HGB BLD-MCNC: 13.4 G/DL (ref 11.5–15.5)
IRON 24H UR-MRATE: 101 MCG/DL (ref 50–175)
MCH RBC QN AUTO: 28.4 PG (ref 27–31)
MCHC RBC AUTO-ENTMCNC: 32.1 G/DL (ref 32–36)
MCV RBC AUTO: 88.3 FL (ref 80–99)
PLATELET # BLD AUTO: 233 10*3/MM3 (ref 150–450)
PMV BLD AUTO: 10.8 FL (ref 6–12)
POTASSIUM BLD-SCNC: 3.9 MMOL/L (ref 3.5–5.5)
PROT SERPL-MCNC: 6 G/DL (ref 5.7–8.2)
RBC # BLD AUTO: 4.72 10*6/MM3 (ref 3.89–5.14)
SODIUM BLD-SCNC: 142 MMOL/L (ref 132–146)
TRIGL SERPL-MCNC: 134 MG/DL (ref 0–150)
TSH SERPL DL<=0.05 MIU/L-ACNC: 0.07 MIU/ML (ref 0.35–5.35)
VIT B12 BLD-MCNC: >2000 PG/ML (ref 211–911)
WBC NRBC COR # BLD: 7.31 10*3/MM3 (ref 3.5–10.8)

## 2019-04-01 PROCEDURE — 80053 COMPREHEN METABOLIC PANEL: CPT | Performed by: INTERNAL MEDICINE

## 2019-04-01 PROCEDURE — 84443 ASSAY THYROID STIM HORMONE: CPT | Performed by: INTERNAL MEDICINE

## 2019-04-01 PROCEDURE — 99214 OFFICE O/P EST MOD 30 MIN: CPT | Performed by: INTERNAL MEDICINE

## 2019-04-01 PROCEDURE — 83540 ASSAY OF IRON: CPT | Performed by: INTERNAL MEDICINE

## 2019-04-01 PROCEDURE — 82607 VITAMIN B-12: CPT | Performed by: INTERNAL MEDICINE

## 2019-04-01 PROCEDURE — 80061 LIPID PANEL: CPT | Performed by: INTERNAL MEDICINE

## 2019-04-01 PROCEDURE — 83036 HEMOGLOBIN GLYCOSYLATED A1C: CPT | Performed by: INTERNAL MEDICINE

## 2019-04-01 PROCEDURE — 85027 COMPLETE CBC AUTOMATED: CPT | Performed by: INTERNAL MEDICINE

## 2019-04-01 RX ORDER — LEVOTHYROXINE SODIUM 0.15 MG/1
150 TABLET ORAL DAILY
Qty: 90 TABLET | Refills: 1 | Status: SHIPPED | OUTPATIENT
Start: 2019-04-01 | End: 2019-10-18 | Stop reason: SDUPTHER

## 2019-04-01 RX ORDER — TRAZODONE HYDROCHLORIDE 50 MG/1
50 TABLET ORAL NIGHTLY PRN
Qty: 30 TABLET | Refills: 2 | Status: SHIPPED | OUTPATIENT
Start: 2019-04-01 | End: 2019-07-23 | Stop reason: SDUPTHER

## 2019-04-01 NOTE — PROGRESS NOTES
Patient is a 63 y.o. female who is here for a follow up of chronic conditions.  Chief Complaint   Patient presents with   • Hypertension   • Hypothyroidism   • Hyperlipidemia   • Pain         HPI:    Here for f/u.  Difficulty sleeping.  No nocturia with Ziac.  Energy level is ok.  No dizziness or lightheadedness.  No abdominal pains.  No constipation.    History:    Patient Active Problem List   Diagnosis   • Elevated lipids   • GERD without esophagitis   • Hypertension   • Hypothyroidism   • Glucose intolerance (impaired glucose tolerance)   • Polycythemia   • Migraine with aura   • Joint pain   • Acute pain of right shoulder   • Pre-op examination   • Diverticulosis of large intestine without hemorrhage   • Primary insomnia       Past Medical History:   Diagnosis Date   • Chemical exposure     phenteramine   • Diverticulosis    • Fibroid, uterine    • Joint pain    • Migraine with aura    • Needle stick injury        Past Surgical History:   Procedure Laterality Date   • ABDOMINAL HYSTERECTOMY Bilateral 2007    Removal Of Both Ovaries   • BREAST BIOPSY Left    • BREAST BIOPSY Right    • BREAST EXCISIONAL BIOPSY     • COLONOSCOPY  02/14/2018   • GASTRIC BYPASS     • INCISIONAL BREAST BIOPSY Left 1998   • OOPHORECTOMY     • TOTAL KNEE ARTHROPLASTY Right    • TUBAL ABDOMINAL LIGATION  1980       Current Outpatient Medications on File Prior to Visit   Medication Sig   • acetaminophen (TYLENOL) 325 MG tablet Take 2 tablets by mouth Every 6 (Six) Hours As Needed for Mild Pain .   • aspirin 81 MG EC tablet Take 81 mg by mouth Daily.   • bisoprolol-hydrochlorothiazide (ZIAC) 5-6.25 MG per tablet Take 1 tablet by mouth Daily.   • calcium carbonate (OS-HARPER) 600 MG tablet Take 600 mg by mouth Every Night.   • diclofenac (FLECTOR) 1.3 % patch patch Apply 1 patch topically 2 (Two) Times a Day.   • estradiol (CLIMARA) 0.1 MG/24HR patch 0.5 patches 1 (One) Time Per Week.   • ibuprofen (ADVIL,MOTRIN) 200 MG tablet Take 600 mg by  mouth At Night As Needed for Mild Pain .   • losartan-hydrochlorothiazide (HYZAAR) 100-25 MG per tablet Take 1 tablet by mouth Daily.   • Multiple Vitamins-Minerals (MULTIVITAMIN ADULT PO) Take 1 tablet by mouth Daily.   • omeprazole (priLOSEC) 40 MG capsule Take 1 capsule by mouth Daily.   • pravastatin (PRAVACHOL) 40 MG tablet Take 1 tablet by mouth Daily.   • saccharomyces boulardii (FLORASTOR) 250 MG capsule Take 1 capsule by mouth 2 (Two) Times a Day.   • traMADol (ULTRAM) 50 MG tablet Take 1 tablet by mouth Every 6 (Six) Hours As Needed for Moderate Pain .   • [DISCONTINUED] levothyroxine (SYNTHROID, LEVOTHROID) 175 MCG tablet Take 1 tablet by mouth Daily.     No current facility-administered medications on file prior to visit.        Family History   Problem Relation Age of Onset   • Heart disease Other    • Diabetes Other    • Stroke Other    • Breast cancer Other    • Breast cancer Paternal Aunt 50   • Diabetes Maternal Grandmother    • Stroke Maternal Grandfather    • Heart disease Paternal Grandfather        Social History     Socioeconomic History   • Marital status:      Spouse name: Not on file   • Number of children: Not on file   • Years of education: Not on file   • Highest education level: Not on file   Tobacco Use   • Smoking status: Former Smoker   Substance and Sexual Activity   • Drug use: No   • Sexual activity: Defer   Social History Narrative    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          Review of Systems   Constitutional: Negative for diaphoresis, fatigue and unexpected weight change.   HENT: Negative for congestion, ear pain, facial swelling, hearing loss, nosebleeds, postnasal drip, sinus pressure and sore throat.    Eyes: Negative for pain, discharge and itching.   Respiratory: Negative for cough, chest tightness, shortness of breath and wheezing.    Cardiovascular: Negative for chest pain, palpitations and leg swelling.   Gastrointestinal: Negative for abdominal distention,  "blood in stool, diarrhea, nausea and vomiting.        2/18 colonoscopy without polyps, next 2028   Endocrine: Negative for polydipsia and polyuria.   Genitourinary: Negative for difficulty urinating, dysuria, frequency and hematuria.        3/19 mammogram   Musculoskeletal: Positive for arthralgias. Negative for back pain, gait problem, joint swelling, myalgias and neck pain.   Skin: Negative for rash and wound.   Neurological: Negative for dizziness, syncope, weakness and headaches.   Psychiatric/Behavioral: Negative for dysphoric mood and sleep disturbance. The patient is not nervous/anxious.        /78   Pulse 68   Ht 165.1 cm (65\")   Wt 78 kg (172 lb)   LMP  (LMP Unknown)   Breastfeeding? No   BMI 28.62 kg/m²       Physical Exam   Constitutional: She is oriented to person, place, and time. She appears well-developed and well-nourished.   HENT:   Head: Normocephalic and atraumatic.   Right Ear: External ear normal.   Left Ear: External ear normal.   Mouth/Throat: Oropharynx is clear and moist.   Eyes: Conjunctivae and EOM are normal.   Neck: Normal range of motion. Neck supple.   Cardiovascular: Normal rate, regular rhythm and normal heart sounds.   Pulmonary/Chest: Effort normal and breath sounds normal.   Abdominal: Soft. Bowel sounds are normal.   Musculoskeletal: Normal range of motion.   Lymphadenopathy:     She has no cervical adenopathy.   Neurological: She is alert and oriented to person, place, and time.   Skin: Skin is warm and dry.   Psychiatric: She has a normal mood and affect. Her behavior is normal. Thought content normal.       Procedure:      Discussion/Summary:    hyperlipidemia-labs today, counseled on diet  htn-advised low NA and exercise  hypothryoid-lab today  gerd-stable  Diverticular abscess-f/u CRS  Insomnia-rx trazodone prn  high risk meds- labs today      labs noted and dw patient        Current Outpatient Medications:   •  acetaminophen (TYLENOL) 325 MG tablet, Take 2 " tablets by mouth Every 6 (Six) Hours As Needed for Mild Pain ., Disp: , Rfl:   •  aspirin 81 MG EC tablet, Take 81 mg by mouth Daily., Disp: , Rfl:   •  bisoprolol-hydrochlorothiazide (ZIAC) 5-6.25 MG per tablet, Take 1 tablet by mouth Daily., Disp: 90 tablet, Rfl: 3  •  calcium carbonate (OS-HARPER) 600 MG tablet, Take 600 mg by mouth Every Night., Disp: , Rfl:   •  diclofenac (FLECTOR) 1.3 % patch patch, Apply 1 patch topically 2 (Two) Times a Day., Disp: 60 patch, Rfl: 2  •  estradiol (CLIMARA) 0.1 MG/24HR patch, 0.5 patches 1 (One) Time Per Week., Disp: , Rfl:   •  ibuprofen (ADVIL,MOTRIN) 200 MG tablet, Take 600 mg by mouth At Night As Needed for Mild Pain ., Disp: , Rfl:   •  levothyroxine (SYNTHROID, LEVOTHROID) 150 MCG tablet, Take 1 tablet by mouth Daily., Disp: 90 tablet, Rfl: 1  •  losartan-hydrochlorothiazide (HYZAAR) 100-25 MG per tablet, Take 1 tablet by mouth Daily., Disp: 90 tablet, Rfl: 3  •  Multiple Vitamins-Minerals (MULTIVITAMIN ADULT PO), Take 1 tablet by mouth Daily., Disp: , Rfl:   •  omeprazole (priLOSEC) 40 MG capsule, Take 1 capsule by mouth Daily., Disp: 90 capsule, Rfl: 3  •  pravastatin (PRAVACHOL) 40 MG tablet, Take 1 tablet by mouth Daily., Disp: 90 tablet, Rfl: 3  •  saccharomyces boulardii (FLORASTOR) 250 MG capsule, Take 1 capsule by mouth 2 (Two) Times a Day., Disp: 60 capsule, Rfl: 1  •  traMADol (ULTRAM) 50 MG tablet, Take 1 tablet by mouth Every 6 (Six) Hours As Needed for Moderate Pain ., Disp: 120 tablet, Rfl: 2  •  traZODone (DESYREL) 50 MG tablet, Take 1 tablet by mouth At Night As Needed for Sleep., Disp: 30 tablet, Rfl: 2        Demetria was seen today for hypertension, hypothyroidism, hyperlipidemia and pain.    Diagnoses and all orders for this visit:    Essential hypertension    GERD without esophagitis    Diverticulosis of large intestine without hemorrhage    Other specified hypothyroidism  -     TSH  -     levothyroxine (SYNTHROID, LEVOTHROID) 150 MCG tablet; Take 1  tablet by mouth Daily.    Glucose intolerance (impaired glucose tolerance)  -     Hemoglobin A1c    Polycythemia  -     CBC (No Diff)  -     Vitamin B12  -     Iron    Elevated lipids  -     Comprehensive Metabolic Panel  -     Lipid Panel    Primary insomnia  -     traZODone (DESYREL) 50 MG tablet; Take 1 tablet by mouth At Night As Needed for Sleep.

## 2019-07-19 DIAGNOSIS — I10 ESSENTIAL HYPERTENSION: ICD-10-CM

## 2019-07-19 RX ORDER — BISOPROLOL FUMARATE AND HYDROCHLOROTHIAZIDE 5; 6.25 MG/1; MG/1
1 TABLET ORAL DAILY
Qty: 90 TABLET | Refills: 3 | Status: SHIPPED | OUTPATIENT
Start: 2019-07-19 | End: 2020-07-13 | Stop reason: SDUPTHER

## 2019-07-23 DIAGNOSIS — F51.01 PRIMARY INSOMNIA: ICD-10-CM

## 2019-07-23 RX ORDER — TRAZODONE HYDROCHLORIDE 50 MG/1
50 TABLET ORAL NIGHTLY PRN
Qty: 30 TABLET | Refills: 2 | Status: SHIPPED | OUTPATIENT
Start: 2019-07-23 | End: 2019-10-18 | Stop reason: SDUPTHER

## 2019-08-01 ENCOUNTER — OFFICE VISIT (OUTPATIENT)
Dept: INTERNAL MEDICINE | Facility: CLINIC | Age: 64
End: 2019-08-01

## 2019-08-01 VITALS
SYSTOLIC BLOOD PRESSURE: 130 MMHG | HEIGHT: 65 IN | HEART RATE: 64 BPM | DIASTOLIC BLOOD PRESSURE: 72 MMHG | WEIGHT: 170 LBS | BODY MASS INDEX: 28.32 KG/M2

## 2019-08-01 DIAGNOSIS — I10 ESSENTIAL HYPERTENSION: Primary | ICD-10-CM

## 2019-08-01 DIAGNOSIS — K57.30 DIVERTICULOSIS OF LARGE INTESTINE WITHOUT HEMORRHAGE: ICD-10-CM

## 2019-08-01 DIAGNOSIS — E03.8 OTHER SPECIFIED HYPOTHYROIDISM: ICD-10-CM

## 2019-08-01 DIAGNOSIS — G43.109 MIGRAINE WITH AURA AND WITHOUT STATUS MIGRAINOSUS, NOT INTRACTABLE: ICD-10-CM

## 2019-08-01 DIAGNOSIS — R73.02 GLUCOSE INTOLERANCE (IMPAIRED GLUCOSE TOLERANCE): ICD-10-CM

## 2019-08-01 DIAGNOSIS — E78.5 ELEVATED LIPIDS: ICD-10-CM

## 2019-08-01 DIAGNOSIS — K21.9 GERD WITHOUT ESOPHAGITIS: ICD-10-CM

## 2019-08-01 DIAGNOSIS — F51.01 PRIMARY INSOMNIA: ICD-10-CM

## 2019-08-01 PROBLEM — M25.511 ACUTE PAIN OF RIGHT SHOULDER: Status: RESOLVED | Noted: 2017-03-07 | Resolved: 2019-08-01

## 2019-08-01 LAB
ANION GAP SERPL CALCULATED.3IONS-SCNC: 12.1 MMOL/L (ref 5–15)
BUN BLD-MCNC: 22 MG/DL (ref 8–23)
BUN/CREAT SERPL: 25 (ref 7–25)
CALCIUM SPEC-SCNC: 9.4 MG/DL (ref 8.6–10.5)
CHLORIDE SERPL-SCNC: 100 MMOL/L (ref 98–107)
CO2 SERPL-SCNC: 28.9 MMOL/L (ref 22–29)
CREAT BLD-MCNC: 0.88 MG/DL (ref 0.57–1)
GFR SERPL CREATININE-BSD FRML MDRD: 65 ML/MIN/1.73
GLUCOSE BLD-MCNC: 104 MG/DL (ref 65–99)
POTASSIUM BLD-SCNC: 4.1 MMOL/L (ref 3.5–5.2)
SODIUM BLD-SCNC: 141 MMOL/L (ref 136–145)
TSH SERPL DL<=0.05 MIU/L-ACNC: 2.09 MIU/ML (ref 0.27–4.2)

## 2019-08-01 PROCEDURE — 84443 ASSAY THYROID STIM HORMONE: CPT | Performed by: INTERNAL MEDICINE

## 2019-08-01 PROCEDURE — 80048 BASIC METABOLIC PNL TOTAL CA: CPT | Performed by: INTERNAL MEDICINE

## 2019-08-01 PROCEDURE — 99214 OFFICE O/P EST MOD 30 MIN: CPT | Performed by: INTERNAL MEDICINE

## 2019-08-01 NOTE — PROGRESS NOTES
Patient is a 63 y.o. female who is here for a follow up of chronic conditions.  Chief Complaint   Patient presents with   • Hypertension   • Hypothyroidism   • Hyperlipidemia   • Pain         HPI:    Here for f/u.  Doing ok.  No dizziness or lightheadedness.  No abdominal pains.  No fever or chills.  Energy level is good.  No HAs.  GERD is controlled.  Sleeping well.  No falls.  Compliant with fiber intake.     History:     Patient Active Problem List   Diagnosis   • Elevated lipids   • GERD without esophagitis   • Hypertension   • Hypothyroidism   • Glucose intolerance (impaired glucose tolerance)   • Polycythemia   • Migraine with aura   • Joint pain   • Pre-op examination   • Diverticulosis of large intestine without hemorrhage   • Primary insomnia       Past Medical History:   Diagnosis Date   • Chemical exposure     phenteramine   • Diverticulosis    • Fibroid, uterine    • Joint pain    • Migraine with aura    • Needle stick injury        Past Surgical History:   Procedure Laterality Date   • ABDOMINAL HYSTERECTOMY Bilateral 2007    Removal Of Both Ovaries   • BREAST BIOPSY Left    • BREAST BIOPSY Right    • BREAST EXCISIONAL BIOPSY     • COLONOSCOPY  02/14/2018   • GASTRIC BYPASS     • INCISIONAL BREAST BIOPSY Left 1998   • OOPHORECTOMY     • TOTAL KNEE ARTHROPLASTY Right    • TUBAL ABDOMINAL LIGATION  1980       Current Outpatient Medications on File Prior to Visit   Medication Sig   • acetaminophen (TYLENOL) 325 MG tablet Take 2 tablets by mouth Every 6 (Six) Hours As Needed for Mild Pain .   • aspirin 81 MG EC tablet Take 81 mg by mouth Daily.   • bisoprolol-hydrochlorothiazide (ZIAC) 5-6.25 MG per tablet Take 1 tablet by mouth Daily.   • calcium carbonate (OS-HARPER) 600 MG tablet Take 600 mg by mouth Every Night.   • diclofenac (FLECTOR) 1.3 % patch patch Apply 1 patch topically 2 (Two) Times a Day.   • estradiol (CLIMARA) 0.1 MG/24HR patch 0.5 patches 1 (One) Time Per Week.   • ibuprofen (ADVIL,MOTRIN) 200  MG tablet Take 600 mg by mouth At Night As Needed for Mild Pain .   • levothyroxine (SYNTHROID, LEVOTHROID) 150 MCG tablet Take 1 tablet by mouth Daily.   • losartan-hydrochlorothiazide (HYZAAR) 100-25 MG per tablet Take 1 tablet by mouth Daily.   • Multiple Vitamins-Minerals (MULTIVITAMIN ADULT PO) Take 1 tablet by mouth Daily.   • omeprazole (priLOSEC) 40 MG capsule Take 1 capsule by mouth Daily.   • traMADol (ULTRAM) 50 MG tablet Take 1 tablet by mouth Every 6 (Six) Hours As Needed for Moderate Pain .   • traZODone (DESYREL) 50 MG tablet Take 1 tablet by mouth At Night As Needed for Sleep.   • [DISCONTINUED] pravastatin (PRAVACHOL) 40 MG tablet Take 1 tablet by mouth Daily.   • [DISCONTINUED] saccharomyces boulardii (FLORASTOR) 250 MG capsule Take 1 capsule by mouth 2 (Two) Times a Day.     No current facility-administered medications on file prior to visit.        Family History   Problem Relation Age of Onset   • Heart disease Other    • Diabetes Other    • Stroke Other    • Breast cancer Other    • Breast cancer Paternal Aunt 50   • Diabetes Maternal Grandmother    • Stroke Maternal Grandfather    • Heart disease Paternal Grandfather        Social History     Socioeconomic History   • Marital status:      Spouse name: Not on file   • Number of children: Not on file   • Years of education: Not on file   • Highest education level: Not on file   Tobacco Use   • Smoking status: Former Smoker   Substance and Sexual Activity   • Drug use: No   • Sexual activity: Defer   Social History Narrative    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          Review of Systems   Constitutional: Negative for diaphoresis, fatigue and unexpected weight change.   HENT: Negative for congestion, ear pain, facial swelling, hearing loss, nosebleeds, postnasal drip, sinus pressure and sore throat.    Eyes: Negative for pain, discharge and itching.   Respiratory: Negative for cough, chest tightness, shortness of breath and wheezing.   "  Cardiovascular: Negative for chest pain, palpitations and leg swelling.   Gastrointestinal: Negative for abdominal distention, blood in stool, diarrhea, nausea and vomiting.        2/18 colonoscopy without polyps, next 2028   Endocrine: Negative for polydipsia and polyuria.   Genitourinary: Negative for difficulty urinating, dysuria, frequency and hematuria.        3/19 mammogram   Musculoskeletal: Positive for arthralgias. Negative for back pain, gait problem, joint swelling, myalgias and neck pain.   Skin: Negative for rash and wound.   Neurological: Negative for dizziness, syncope, weakness and headaches.   Psychiatric/Behavioral: Negative for dysphoric mood and sleep disturbance. The patient is not nervous/anxious.        /72   Pulse 64   Ht 165.1 cm (65\")   Wt 77.1 kg (170 lb)   LMP  (LMP Unknown)   BMI 28.29 kg/m²       Physical Exam   Constitutional: She is oriented to person, place, and time. She appears well-developed and well-nourished.   HENT:   Head: Normocephalic and atraumatic.   Right Ear: External ear normal.   Left Ear: External ear normal.   Mouth/Throat: Oropharynx is clear and moist.   Eyes: Conjunctivae and EOM are normal.   Neck: Normal range of motion. Neck supple.   Cardiovascular: Normal rate, regular rhythm and normal heart sounds.   Pulmonary/Chest: Effort normal and breath sounds normal.   Abdominal: Soft. Bowel sounds are normal.   Musculoskeletal: Normal range of motion.   Lymphadenopathy:     She has no cervical adenopathy.   Neurological: She is alert and oriented to person, place, and time.   Skin: Skin is warm and dry.   Psychiatric: She has a normal mood and affect. Her behavior is normal. Thought content normal.       Procedure:      Discussion/Summary:    hyperlipidemia-labs dw patient, counseled on diet  htn-advised low NA and exercise, goal of <130/80  hypothryoid-lab today on lower dose  gerd-stable  Diverticular abscess-resolved, advised adequate fiber " intake  Insomnia-stable  high risk meds- labs today      labs noted and dw patient           Current Outpatient Medications:   •  acetaminophen (TYLENOL) 325 MG tablet, Take 2 tablets by mouth Every 6 (Six) Hours As Needed for Mild Pain ., Disp: , Rfl:   •  aspirin 81 MG EC tablet, Take 81 mg by mouth Daily., Disp: , Rfl:   •  bisoprolol-hydrochlorothiazide (ZIAC) 5-6.25 MG per tablet, Take 1 tablet by mouth Daily., Disp: 90 tablet, Rfl: 3  •  calcium carbonate (OS-HARPER) 600 MG tablet, Take 600 mg by mouth Every Night., Disp: , Rfl:   •  diclofenac (FLECTOR) 1.3 % patch patch, Apply 1 patch topically 2 (Two) Times a Day., Disp: 60 patch, Rfl: 2  •  estradiol (CLIMARA) 0.1 MG/24HR patch, 0.5 patches 1 (One) Time Per Week., Disp: , Rfl:   •  ibuprofen (ADVIL,MOTRIN) 200 MG tablet, Take 600 mg by mouth At Night As Needed for Mild Pain ., Disp: , Rfl:   •  levothyroxine (SYNTHROID, LEVOTHROID) 150 MCG tablet, Take 1 tablet by mouth Daily., Disp: 90 tablet, Rfl: 1  •  losartan-hydrochlorothiazide (HYZAAR) 100-25 MG per tablet, Take 1 tablet by mouth Daily., Disp: 90 tablet, Rfl: 3  •  Multiple Vitamins-Minerals (MULTIVITAMIN ADULT PO), Take 1 tablet by mouth Daily., Disp: , Rfl:   •  omeprazole (priLOSEC) 40 MG capsule, Take 1 capsule by mouth Daily., Disp: 90 capsule, Rfl: 3  •  traMADol (ULTRAM) 50 MG tablet, Take 1 tablet by mouth Every 6 (Six) Hours As Needed for Moderate Pain ., Disp: 120 tablet, Rfl: 2  •  traZODone (DESYREL) 50 MG tablet, Take 1 tablet by mouth At Night As Needed for Sleep., Disp: 30 tablet, Rfl: 2        Demetria was seen today for hypertension, hypothyroidism, hyperlipidemia and pain.    Diagnoses and all orders for this visit:    Essential hypertension  -     Basic Metabolic Panel    Migraine with aura and without status migrainosus, not intractable    GERD without esophagitis    Diverticulosis of large intestine without hemorrhage    Other specified hypothyroidism  -     TSH    Glucose intolerance  (impaired glucose tolerance)    Primary insomnia    Elevated lipids

## 2019-08-05 DIAGNOSIS — R05.9 COUGH: Primary | ICD-10-CM

## 2019-08-21 DIAGNOSIS — M15.9 PRIMARY OSTEOARTHRITIS INVOLVING MULTIPLE JOINTS: ICD-10-CM

## 2019-08-21 RX ORDER — TRAMADOL HYDROCHLORIDE 50 MG/1
50 TABLET ORAL EVERY 6 HOURS PRN
Qty: 120 TABLET | Refills: 2 | Status: SHIPPED | OUTPATIENT
Start: 2019-08-21 | End: 2019-12-02 | Stop reason: SDUPTHER

## 2019-09-24 DIAGNOSIS — I10 ESSENTIAL HYPERTENSION: ICD-10-CM

## 2019-09-24 RX ORDER — LOSARTAN POTASSIUM AND HYDROCHLOROTHIAZIDE 25; 100 MG/1; MG/1
1 TABLET ORAL DAILY
Qty: 90 TABLET | Refills: 3 | Status: SHIPPED | OUTPATIENT
Start: 2019-09-24 | End: 2020-09-21 | Stop reason: SDUPTHER

## 2019-10-03 RX ORDER — NITROFURANTOIN 25; 75 MG/1; MG/1
100 CAPSULE ORAL 2 TIMES DAILY
Qty: 10 CAPSULE | Refills: 0 | Status: SHIPPED | OUTPATIENT
Start: 2019-10-03 | End: 2020-02-03

## 2019-10-18 DIAGNOSIS — E03.8 OTHER SPECIFIED HYPOTHYROIDISM: ICD-10-CM

## 2019-10-18 DIAGNOSIS — F51.01 PRIMARY INSOMNIA: ICD-10-CM

## 2019-10-18 RX ORDER — LEVOTHYROXINE SODIUM 0.15 MG/1
150 TABLET ORAL DAILY
Qty: 90 TABLET | Refills: 1 | Status: SHIPPED | OUTPATIENT
Start: 2019-10-18 | End: 2020-04-13 | Stop reason: SDUPTHER

## 2019-10-18 RX ORDER — TRAZODONE HYDROCHLORIDE 50 MG/1
50 TABLET ORAL NIGHTLY PRN
Qty: 30 TABLET | Refills: 2 | Status: SHIPPED | OUTPATIENT
Start: 2019-10-18 | End: 2020-01-15 | Stop reason: SDUPTHER

## 2019-11-18 DIAGNOSIS — R05.9 COUGH: Primary | ICD-10-CM

## 2019-11-18 DIAGNOSIS — K21.9 GERD WITHOUT ESOPHAGITIS: ICD-10-CM

## 2019-11-18 RX ORDER — PRAVASTATIN SODIUM 40 MG
40 TABLET ORAL NIGHTLY
Qty: 90 TABLET | Refills: 1 | Status: SHIPPED | OUTPATIENT
Start: 2019-11-18 | End: 2020-05-26 | Stop reason: SDUPTHER

## 2019-11-18 RX ORDER — PRAVASTATIN SODIUM 40 MG
1 TABLET ORAL NIGHTLY
COMMUNITY
End: 2019-11-18 | Stop reason: SDUPTHER

## 2019-11-18 RX ORDER — OMEPRAZOLE 40 MG/1
40 CAPSULE, DELAYED RELEASE ORAL DAILY
Qty: 90 CAPSULE | Refills: 3 | Status: SHIPPED | OUTPATIENT
Start: 2019-11-18 | End: 2020-11-07 | Stop reason: SDUPTHER

## 2019-11-26 ENCOUNTER — HOSPITAL ENCOUNTER (OUTPATIENT)
Dept: GENERAL RADIOLOGY | Facility: HOSPITAL | Age: 64
Discharge: HOME | End: 2019-11-26
Admitting: INTERNAL MEDICINE

## 2019-11-26 PROCEDURE — 71046 X-RAY EXAM CHEST 2 VIEWS: CPT

## 2019-12-02 DIAGNOSIS — M15.9 PRIMARY OSTEOARTHRITIS INVOLVING MULTIPLE JOINTS: ICD-10-CM

## 2019-12-02 RX ORDER — TRAMADOL HYDROCHLORIDE 50 MG/1
50 TABLET ORAL EVERY 6 HOURS PRN
Qty: 120 TABLET | Refills: 2 | Status: SHIPPED | OUTPATIENT
Start: 2019-12-02 | End: 2020-03-31 | Stop reason: SDUPTHER

## 2020-01-15 DIAGNOSIS — M79.642 PAIN IN BOTH HANDS: Primary | ICD-10-CM

## 2020-01-15 DIAGNOSIS — F51.01 PRIMARY INSOMNIA: ICD-10-CM

## 2020-01-15 DIAGNOSIS — M79.641 PAIN IN BOTH HANDS: Primary | ICD-10-CM

## 2020-01-15 RX ORDER — TRAZODONE HYDROCHLORIDE 50 MG/1
50 TABLET ORAL NIGHTLY PRN
Qty: 30 TABLET | Refills: 2 | Status: SHIPPED | OUTPATIENT
Start: 2020-01-15 | End: 2020-04-13 | Stop reason: SDUPTHER

## 2020-01-29 ENCOUNTER — HOSPITAL ENCOUNTER (OUTPATIENT)
Dept: GENERAL RADIOLOGY | Facility: HOSPITAL | Age: 65
Discharge: HOME | End: 2020-01-29
Admitting: INTERNAL MEDICINE

## 2020-01-29 PROCEDURE — 73130 X-RAY EXAM OF HAND: CPT

## 2020-02-03 ENCOUNTER — OFFICE VISIT (OUTPATIENT)
Dept: INTERNAL MEDICINE | Facility: CLINIC | Age: 65
End: 2020-02-03

## 2020-02-03 VITALS
HEART RATE: 68 BPM | SYSTOLIC BLOOD PRESSURE: 120 MMHG | BODY MASS INDEX: 28.32 KG/M2 | DIASTOLIC BLOOD PRESSURE: 72 MMHG | HEIGHT: 65 IN | WEIGHT: 170 LBS

## 2020-02-03 DIAGNOSIS — E78.5 ELEVATED LIPIDS: ICD-10-CM

## 2020-02-03 DIAGNOSIS — M19.91 PRIMARY OSTEOARTHRITIS, UNSPECIFIED SITE: ICD-10-CM

## 2020-02-03 DIAGNOSIS — E03.8 OTHER SPECIFIED HYPOTHYROIDISM: ICD-10-CM

## 2020-02-03 DIAGNOSIS — I10 ESSENTIAL HYPERTENSION: Primary | ICD-10-CM

## 2020-02-03 DIAGNOSIS — K21.9 GERD WITHOUT ESOPHAGITIS: ICD-10-CM

## 2020-02-03 DIAGNOSIS — R73.02 GLUCOSE INTOLERANCE (IMPAIRED GLUCOSE TOLERANCE): ICD-10-CM

## 2020-02-03 DIAGNOSIS — K57.30 DIVERTICULOSIS OF LARGE INTESTINE WITHOUT HEMORRHAGE: ICD-10-CM

## 2020-02-03 DIAGNOSIS — M72.0 DUPUYTREN'S CONTRACTURE OF HAND: ICD-10-CM

## 2020-02-03 LAB
ALBUMIN SERPL-MCNC: 4.5 G/DL (ref 3.5–5.2)
ALBUMIN/GLOB SERPL: 2 G/DL
ALP SERPL-CCNC: 68 U/L (ref 39–117)
ALT SERPL W P-5'-P-CCNC: 27 U/L (ref 1–33)
ANION GAP SERPL CALCULATED.3IONS-SCNC: 12.3 MMOL/L (ref 5–15)
AST SERPL-CCNC: 27 U/L (ref 1–32)
BILIRUB SERPL-MCNC: 0.5 MG/DL (ref 0.2–1.2)
BUN BLD-MCNC: 24 MG/DL (ref 8–23)
BUN/CREAT SERPL: 30.8 (ref 7–25)
CALCIUM SPEC-SCNC: 9.7 MG/DL (ref 8.6–10.5)
CHLORIDE SERPL-SCNC: 99 MMOL/L (ref 98–107)
CHOLEST SERPL-MCNC: 170 MG/DL (ref 0–200)
CO2 SERPL-SCNC: 29.7 MMOL/L (ref 22–29)
CREAT BLD-MCNC: 0.78 MG/DL (ref 0.57–1)
DEPRECATED RDW RBC AUTO: 43.9 FL (ref 37–54)
ERYTHROCYTE [DISTWIDTH] IN BLOOD BY AUTOMATED COUNT: 13.8 % (ref 12.3–15.4)
GFR SERPL CREATININE-BSD FRML MDRD: 74 ML/MIN/1.73
GLOBULIN UR ELPH-MCNC: 2.2 GM/DL
GLUCOSE BLD-MCNC: 111 MG/DL (ref 65–99)
HCT VFR BLD AUTO: 41.4 % (ref 34–46.6)
HDLC SERPL-MCNC: 69 MG/DL (ref 40–60)
HGB BLD-MCNC: 13.3 G/DL (ref 12–15.9)
LDLC SERPL CALC-MCNC: 76 MG/DL (ref 0–100)
LDLC/HDLC SERPL: 1.1 {RATIO}
MCH RBC QN AUTO: 27.9 PG (ref 26.6–33)
MCHC RBC AUTO-ENTMCNC: 32.1 G/DL (ref 31.5–35.7)
MCV RBC AUTO: 86.8 FL (ref 79–97)
PLATELET # BLD AUTO: 215 10*3/MM3 (ref 140–450)
PMV BLD AUTO: 10.2 FL (ref 6–12)
POTASSIUM BLD-SCNC: 3.7 MMOL/L (ref 3.5–5.2)
PROT SERPL-MCNC: 6.7 G/DL (ref 6–8.5)
RBC # BLD AUTO: 4.77 10*6/MM3 (ref 3.77–5.28)
SODIUM BLD-SCNC: 141 MMOL/L (ref 136–145)
TRIGL SERPL-MCNC: 125 MG/DL (ref 0–150)
TSH SERPL DL<=0.05 MIU/L-ACNC: 1.12 UIU/ML (ref 0.27–4.2)
VLDLC SERPL-MCNC: 25 MG/DL (ref 5–40)
WBC NRBC COR # BLD: 7.5 10*3/MM3 (ref 3.4–10.8)

## 2020-02-03 PROCEDURE — 80053 COMPREHEN METABOLIC PANEL: CPT | Performed by: INTERNAL MEDICINE

## 2020-02-03 PROCEDURE — 84443 ASSAY THYROID STIM HORMONE: CPT | Performed by: INTERNAL MEDICINE

## 2020-02-03 PROCEDURE — 99214 OFFICE O/P EST MOD 30 MIN: CPT | Performed by: INTERNAL MEDICINE

## 2020-02-03 PROCEDURE — 85027 COMPLETE CBC AUTOMATED: CPT | Performed by: INTERNAL MEDICINE

## 2020-02-03 PROCEDURE — 80061 LIPID PANEL: CPT | Performed by: INTERNAL MEDICINE

## 2020-02-03 PROCEDURE — 83036 HEMOGLOBIN GLYCOSYLATED A1C: CPT | Performed by: INTERNAL MEDICINE

## 2020-02-03 RX ORDER — CELECOXIB 200 MG/1
200 CAPSULE ORAL DAILY PRN
Qty: 30 CAPSULE | Refills: 5 | Status: SHIPPED | OUTPATIENT
Start: 2020-02-03 | End: 2020-08-10 | Stop reason: SDUPTHER

## 2020-02-03 NOTE — PROGRESS NOTES
Patient is a 64 y.o. female who is here for a follow up of hypertension,hypothyroidism and hyperlipidemia.  Chief Complaint   Patient presents with   • Hypertension   • Hypothyroidism   • Hyperlipidemia         HPI:    Here for mgmt of HTN/hypothyroid and hyperlipidemia.  Onset years.  BP has been up a bit due to increased NSAIDs use.  No dizziness or lightheadedness.  No HAs.  Energy level is good.  No temperature intolerance.  GERD is controlled with PPI.  No abdominal pains.      History:     Patient Active Problem List   Diagnosis   • Elevated lipids   • GERD without esophagitis   • Hypertension   • Hypothyroidism   • Glucose intolerance (impaired glucose tolerance)   • Polycythemia   • Migraine with aura   • Joint pain   • Pre-op examination   • Diverticulosis of large intestine without hemorrhage   • Primary insomnia   • Primary osteoarthritis   • Dupuytren's contracture of hand       Past Medical History:   Diagnosis Date   • Chemical exposure     phenteramine   • Diverticulosis    • Fibroid, uterine    • Joint pain    • Migraine with aura    • Needle stick injury        Past Surgical History:   Procedure Laterality Date   • ABDOMINAL HYSTERECTOMY Bilateral 2007    Removal Of Both Ovaries   • BREAST BIOPSY Left    • BREAST BIOPSY Right    • BREAST EXCISIONAL BIOPSY     • COLONOSCOPY  02/14/2018   • GASTRIC BYPASS     • INCISIONAL BREAST BIOPSY Left 1998   • OOPHORECTOMY     • TOTAL KNEE ARTHROPLASTY Right    • TUBAL ABDOMINAL LIGATION  1980       Current Outpatient Medications on File Prior to Visit   Medication Sig   • acetaminophen (TYLENOL) 325 MG tablet Take 2 tablets by mouth Every 6 (Six) Hours As Needed for Mild Pain .   • aspirin 81 MG EC tablet Take 81 mg by mouth Daily.   • bisoprolol-hydrochlorothiazide (ZIAC) 5-6.25 MG per tablet Take 1 tablet by mouth Daily.   • calcium carbonate (OS-HARPER) 600 MG tablet Take 600 mg by mouth Every Night.   • diclofenac (FLECTOR) 1.3 % patch patch Apply 1 patch  topically 2 (Two) Times a Day.   • estradiol (CLIMARA) 0.1 MG/24HR patch 0.5 patches 1 (One) Time Per Week.   • levothyroxine (SYNTHROID, LEVOTHROID) 150 MCG tablet Take 1 tablet by mouth Daily.   • losartan-hydrochlorothiazide (HYZAAR) 100-25 MG per tablet Take 1 tablet by mouth Daily.   • Multiple Vitamins-Minerals (MULTIVITAMIN ADULT PO) Take 1 tablet by mouth Daily.   • omeprazole (priLOSEC) 40 MG capsule Take 1 capsule by mouth Daily.   • pravastatin (PRAVACHOL) 40 MG tablet Take 1 tablet by mouth Every Night.   • traMADol (ULTRAM) 50 MG tablet Take 1 tablet by mouth Every 6 (Six) Hours As Needed for Moderate Pain .   • traZODone (DESYREL) 50 MG tablet Take 1 tablet by mouth At Night As Needed for Sleep.     No current facility-administered medications on file prior to visit.        Family History   Problem Relation Age of Onset   • Heart disease Other    • Diabetes Other    • Stroke Other    • Breast cancer Other    • Breast cancer Paternal Aunt 50   • Diabetes Maternal Grandmother    • Stroke Maternal Grandfather    • Heart disease Paternal Grandfather        Social History     Socioeconomic History   • Marital status:      Spouse name: Not on file   • Number of children: Not on file   • Years of education: Not on file   • Highest education level: Not on file   Tobacco Use   • Smoking status: Former Smoker   Substance and Sexual Activity   • Drug use: No   • Sexual activity: Defer   Social History Narrative    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          Review of Systems   Constitutional: Negative for diaphoresis, fatigue and unexpected weight change.   HENT: Negative for congestion, ear pain, facial swelling, hearing loss, nosebleeds, postnasal drip, sinus pressure and sore throat.    Eyes: Negative for pain, discharge and itching.   Respiratory: Negative for cough, chest tightness, shortness of breath and wheezing.    Cardiovascular: Negative for chest pain, palpitations and leg swelling.  "  Gastrointestinal: Negative for abdominal distention, blood in stool, diarrhea, nausea and vomiting.        2/18 colonoscopy without polyps, next 2028   Endocrine: Negative for polydipsia and polyuria.   Genitourinary: Negative for difficulty urinating, dysuria, frequency and hematuria.        3/19 mammogram   Musculoskeletal: Positive for arthralgias. Negative for back pain, gait problem, joint swelling, myalgias and neck pain.   Skin: Negative for rash and wound.   Neurological: Negative for dizziness, syncope, weakness and headaches.   Psychiatric/Behavioral: Negative for dysphoric mood and sleep disturbance. The patient is not nervous/anxious.        /72   Pulse 68   Ht 165.1 cm (65\")   Wt 77.1 kg (170 lb)   LMP  (LMP Unknown)   BMI 28.29 kg/m²       Physical Exam   Constitutional: She is oriented to person, place, and time. She appears well-developed and well-nourished.   HENT:   Head: Normocephalic and atraumatic.   Right Ear: External ear normal.   Left Ear: External ear normal.   Mouth/Throat: Oropharynx is clear and moist.   Eyes: Conjunctivae and EOM are normal.   Neck: Normal range of motion. Neck supple.   Cardiovascular: Normal rate, regular rhythm and normal heart sounds.   Pulmonary/Chest: Effort normal and breath sounds normal.   Abdominal: Soft. Bowel sounds are normal.   Musculoskeletal: Normal range of motion. She exhibits deformity (bilateral D contracture).   Lymphadenopathy:     She has no cervical adenopathy.   Neurological: She is alert and oriented to person, place, and time.   Skin: Skin is warm and dry.   Psychiatric: She has a normal mood and affect. Her behavior is normal. Thought content normal.       Procedure:      Discussion/Summary:    hyperlipidemia-labs today at goal, counseled on diet  htn-advised low NA and exercise, goal of <130/80, stable  hypothryoid-lab today at goal  gerd-stable on omeprazole  Diverticular abscess-resolved, advised adequate fiber " intake  Insomnia-stable on trazodone  DJD/Dupuytren contracture-Celebrex prn and ortho when ready      2/3 labs noted and dw patient    Current Outpatient Medications:   •  acetaminophen (TYLENOL) 325 MG tablet, Take 2 tablets by mouth Every 6 (Six) Hours As Needed for Mild Pain ., Disp: , Rfl:   •  aspirin 81 MG EC tablet, Take 81 mg by mouth Daily., Disp: , Rfl:   •  bisoprolol-hydrochlorothiazide (ZIAC) 5-6.25 MG per tablet, Take 1 tablet by mouth Daily., Disp: 90 tablet, Rfl: 3  •  calcium carbonate (OS-HARPER) 600 MG tablet, Take 600 mg by mouth Every Night., Disp: , Rfl:   •  diclofenac (FLECTOR) 1.3 % patch patch, Apply 1 patch topically 2 (Two) Times a Day., Disp: 60 patch, Rfl: 2  •  estradiol (CLIMARA) 0.1 MG/24HR patch, 0.5 patches 1 (One) Time Per Week., Disp: , Rfl:   •  levothyroxine (SYNTHROID, LEVOTHROID) 150 MCG tablet, Take 1 tablet by mouth Daily., Disp: 90 tablet, Rfl: 1  •  losartan-hydrochlorothiazide (HYZAAR) 100-25 MG per tablet, Take 1 tablet by mouth Daily., Disp: 90 tablet, Rfl: 3  •  Multiple Vitamins-Minerals (MULTIVITAMIN ADULT PO), Take 1 tablet by mouth Daily., Disp: , Rfl:   •  omeprazole (priLOSEC) 40 MG capsule, Take 1 capsule by mouth Daily., Disp: 90 capsule, Rfl: 3  •  pravastatin (PRAVACHOL) 40 MG tablet, Take 1 tablet by mouth Every Night., Disp: 90 tablet, Rfl: 1  •  traMADol (ULTRAM) 50 MG tablet, Take 1 tablet by mouth Every 6 (Six) Hours As Needed for Moderate Pain ., Disp: 120 tablet, Rfl: 2  •  traZODone (DESYREL) 50 MG tablet, Take 1 tablet by mouth At Night As Needed for Sleep., Disp: 30 tablet, Rfl: 2  •  celecoxib (CELEBREX) 200 MG capsule, Take 1 capsule by mouth Daily As Needed for Moderate Pain ., Disp: 30 capsule, Rfl: 5        Demetria was seen today for hypertension, hypothyroidism and hyperlipidemia.    Diagnoses and all orders for this visit:    Essential hypertension  -     CBC (No Diff)    GERD without esophagitis    Diverticulosis of large intestine without  hemorrhage    Other specified hypothyroidism  -     TSH    Glucose intolerance (impaired glucose tolerance)  -     Hemoglobin A1c    Elevated lipids  -     Comprehensive Metabolic Panel  -     Lipid Panel    Primary osteoarthritis, unspecified site  -     celecoxib (CELEBREX) 200 MG capsule; Take 1 capsule by mouth Daily As Needed for Moderate Pain .    Dupuytren's contracture of hand  -     celecoxib (CELEBREX) 200 MG capsule; Take 1 capsule by mouth Daily As Needed for Moderate Pain .

## 2020-02-04 ENCOUNTER — PATIENT MESSAGE (OUTPATIENT)
Dept: INTERNAL MEDICINE | Facility: CLINIC | Age: 65
End: 2020-02-04

## 2020-02-04 LAB — HBA1C MFR BLD: 5.82 % (ref 4.8–5.6)

## 2020-02-11 ENCOUNTER — TRANSCRIBE ORDERS (OUTPATIENT)
Dept: ADMINISTRATIVE | Facility: HOSPITAL | Age: 65
End: 2020-02-11

## 2020-02-11 DIAGNOSIS — Z12.31 VISIT FOR SCREENING MAMMOGRAM: Primary | ICD-10-CM

## 2020-03-31 DIAGNOSIS — M15.9 PRIMARY OSTEOARTHRITIS INVOLVING MULTIPLE JOINTS: ICD-10-CM

## 2020-03-31 RX ORDER — TRAMADOL HYDROCHLORIDE 50 MG/1
50 TABLET ORAL EVERY 6 HOURS PRN
Qty: 120 TABLET | Refills: 2 | Status: SHIPPED | OUTPATIENT
Start: 2020-03-31 | End: 2020-07-13 | Stop reason: SDUPTHER

## 2020-04-13 DIAGNOSIS — E03.8 OTHER SPECIFIED HYPOTHYROIDISM: ICD-10-CM

## 2020-04-13 DIAGNOSIS — F51.01 PRIMARY INSOMNIA: ICD-10-CM

## 2020-04-13 RX ORDER — TRAZODONE HYDROCHLORIDE 50 MG/1
50 TABLET ORAL NIGHTLY PRN
Qty: 30 TABLET | Refills: 2 | Status: SHIPPED | OUTPATIENT
Start: 2020-04-13 | End: 2020-07-13 | Stop reason: SDUPTHER

## 2020-04-13 RX ORDER — LEVOTHYROXINE SODIUM 0.15 MG/1
150 TABLET ORAL DAILY
Qty: 90 TABLET | Refills: 1 | Status: SHIPPED | OUTPATIENT
Start: 2020-04-13 | End: 2020-10-20 | Stop reason: SDUPTHER

## 2020-04-22 ENCOUNTER — APPOINTMENT (OUTPATIENT)
Dept: MAMMOGRAPHY | Facility: HOSPITAL | Age: 65
End: 2020-04-22

## 2020-05-27 RX ORDER — PRAVASTATIN SODIUM 40 MG
40 TABLET ORAL NIGHTLY
Qty: 90 TABLET | Refills: 1 | Status: SHIPPED | OUTPATIENT
Start: 2020-05-27 | End: 2020-11-22 | Stop reason: SDUPTHER

## 2020-07-01 ENCOUNTER — HOSPITAL ENCOUNTER (OUTPATIENT)
Dept: MAMMOGRAPHY | Facility: HOSPITAL | Age: 65
Discharge: HOME | End: 2020-07-01
Admitting: NURSE PRACTITIONER

## 2020-07-01 DIAGNOSIS — Z12.31 VISIT FOR SCREENING MAMMOGRAM: ICD-10-CM

## 2020-07-01 PROCEDURE — 77067 SCR MAMMO BI INCL CAD: CPT

## 2020-07-01 PROCEDURE — 77067 SCR MAMMO BI INCL CAD: CPT | Performed by: RADIOLOGY

## 2020-07-01 PROCEDURE — 77063 BREAST TOMOSYNTHESIS BI: CPT | Performed by: RADIOLOGY

## 2020-07-01 PROCEDURE — 77063 BREAST TOMOSYNTHESIS BI: CPT

## 2020-07-13 DIAGNOSIS — F51.01 PRIMARY INSOMNIA: ICD-10-CM

## 2020-07-13 DIAGNOSIS — I10 ESSENTIAL HYPERTENSION: ICD-10-CM

## 2020-07-13 DIAGNOSIS — M15.9 PRIMARY OSTEOARTHRITIS INVOLVING MULTIPLE JOINTS: ICD-10-CM

## 2020-07-13 RX ORDER — BISOPROLOL FUMARATE AND HYDROCHLOROTHIAZIDE 5; 6.25 MG/1; MG/1
1 TABLET ORAL DAILY
Qty: 90 TABLET | Refills: 3 | Status: SHIPPED | OUTPATIENT
Start: 2020-07-13 | End: 2021-07-18 | Stop reason: SDUPTHER

## 2020-07-13 RX ORDER — TRAMADOL HYDROCHLORIDE 50 MG/1
50 TABLET ORAL EVERY 6 HOURS PRN
Qty: 120 TABLET | Refills: 2 | Status: SHIPPED | OUTPATIENT
Start: 2020-07-13 | End: 2020-10-22 | Stop reason: SDUPTHER

## 2020-07-13 RX ORDER — TRAZODONE HYDROCHLORIDE 50 MG/1
50 TABLET ORAL NIGHTLY PRN
Qty: 30 TABLET | Refills: 2 | Status: SHIPPED | OUTPATIENT
Start: 2020-07-13 | End: 2020-10-14 | Stop reason: SDUPTHER

## 2020-08-04 ENCOUNTER — OFFICE VISIT (OUTPATIENT)
Dept: INTERNAL MEDICINE | Facility: CLINIC | Age: 65
End: 2020-08-04

## 2020-08-04 VITALS
TEMPERATURE: 97.5 F | DIASTOLIC BLOOD PRESSURE: 76 MMHG | HEIGHT: 65 IN | WEIGHT: 171 LBS | BODY MASS INDEX: 28.49 KG/M2 | SYSTOLIC BLOOD PRESSURE: 132 MMHG | HEART RATE: 68 BPM

## 2020-08-04 DIAGNOSIS — Z98.84 HISTORY OF GASTRIC BYPASS: ICD-10-CM

## 2020-08-04 DIAGNOSIS — R73.02 GLUCOSE INTOLERANCE (IMPAIRED GLUCOSE TOLERANCE): ICD-10-CM

## 2020-08-04 DIAGNOSIS — K21.9 GERD WITHOUT ESOPHAGITIS: ICD-10-CM

## 2020-08-04 DIAGNOSIS — E78.5 ELEVATED LIPIDS: ICD-10-CM

## 2020-08-04 DIAGNOSIS — M19.91 PRIMARY OSTEOARTHRITIS, UNSPECIFIED SITE: ICD-10-CM

## 2020-08-04 DIAGNOSIS — F51.01 PRIMARY INSOMNIA: ICD-10-CM

## 2020-08-04 DIAGNOSIS — K57.30 DIVERTICULOSIS OF LARGE INTESTINE WITHOUT HEMORRHAGE: ICD-10-CM

## 2020-08-04 DIAGNOSIS — E55.9 VITAMIN D DEFICIENCY: ICD-10-CM

## 2020-08-04 DIAGNOSIS — E03.8 OTHER SPECIFIED HYPOTHYROIDISM: ICD-10-CM

## 2020-08-04 DIAGNOSIS — I10 ESSENTIAL HYPERTENSION: Primary | ICD-10-CM

## 2020-08-04 PROCEDURE — 99214 OFFICE O/P EST MOD 30 MIN: CPT | Performed by: INTERNAL MEDICINE

## 2020-08-04 NOTE — PROGRESS NOTES
Patient is a 64 y.o. female who is here for a follow up of hypertension.  Chief Complaint   Patient presents with   • Hypertension         HPI:    Here for mgmt of HTN and hyperlipidemia and hypothyroid.  Onset years.  Has her normal aches and pains.  Improved with Celebrex.  No abdominal pains.  BP has been good.  No dizziness or lightheadedness.  No HAs.  Energy level is good.  No abdominal pains.      History:     Patient Active Problem List   Diagnosis   • Elevated lipids   • GERD without esophagitis   • Hypertension   • Hypothyroidism   • Glucose intolerance (impaired glucose tolerance)   • Polycythemia   • Migraine with aura   • Joint pain   • Pre-op examination   • Diverticulosis of large intestine without hemorrhage   • Primary insomnia   • Primary osteoarthritis   • Dupuytren's contracture of hand       Past Medical History:   Diagnosis Date   • Chemical exposure     phenteramine   • Diverticulosis    • Fibroid, uterine    • Joint pain    • Migraine with aura    • Needle stick injury        Past Surgical History:   Procedure Laterality Date   • ABDOMINAL HYSTERECTOMY Bilateral 2007    Removal Of Both Ovaries   • BREAST BIOPSY Left    • BREAST BIOPSY Right    • BREAST EXCISIONAL BIOPSY     • COLONOSCOPY  02/14/2018   • GASTRIC BYPASS     • INCISIONAL BREAST BIOPSY Left 1998   • OOPHORECTOMY     • TOTAL KNEE ARTHROPLASTY Right    • TUBAL ABDOMINAL LIGATION  1980       Current Outpatient Medications on File Prior to Visit   Medication Sig   • acetaminophen (TYLENOL) 325 MG tablet Take 2 tablets by mouth Every 6 (Six) Hours As Needed for Mild Pain .   • aspirin 81 MG EC tablet Take 81 mg by mouth Daily.   • bisoprolol-hydrochlorothiazide (ZIAC) 5-6.25 MG per tablet Take 1 tablet by mouth Daily.   • calcium carbonate (OS-HARPER) 600 MG tablet Take 600 mg by mouth Every Night.   • celecoxib (CELEBREX) 200 MG capsule Take 1 capsule by mouth Daily As Needed for Moderate Pain .   • diclofenac (FLECTOR) 1.3 % patch  patch Apply 1 patch topically 2 (Two) Times a Day.   • estradiol (CLIMARA) 0.1 MG/24HR patch 0.5 patches 1 (One) Time Per Week.   • levothyroxine (SYNTHROID, LEVOTHROID) 150 MCG tablet Take 1 tablet by mouth Daily.   • losartan-hydrochlorothiazide (HYZAAR) 100-25 MG per tablet Take 1 tablet by mouth Daily.   • Multiple Vitamins-Minerals (MULTIVITAMIN ADULT PO) Take 1 tablet by mouth Daily.   • omeprazole (priLOSEC) 40 MG capsule Take 1 capsule by mouth Daily.   • pravastatin (PRAVACHOL) 40 MG tablet Take 1 tablet by mouth Every Night.   • traMADol (ULTRAM) 50 MG tablet Take 1 tablet by mouth Every 6 (Six) Hours As Needed for Moderate Pain .   • traZODone (DESYREL) 50 MG tablet Take 1 tablet by mouth At Night As Needed for Sleep.     No current facility-administered medications on file prior to visit.        Family History   Problem Relation Age of Onset   • Heart disease Other    • Diabetes Other    • Stroke Other    • Breast cancer Other    • Breast cancer Paternal Aunt 50   • Diabetes Maternal Grandmother    • Stroke Maternal Grandfather    • Heart disease Paternal Grandfather        Social History     Socioeconomic History   • Marital status:      Spouse name: Not on file   • Number of children: Not on file   • Years of education: Not on file   • Highest education level: Not on file   Tobacco Use   • Smoking status: Former Smoker   Substance and Sexual Activity   • Drug use: No   • Sexual activity: Defer   Social History Narrative    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          Review of Systems   Constitutional: Negative for diaphoresis, fatigue and unexpected weight change.   HENT: Negative for congestion, ear pain, facial swelling, hearing loss, nosebleeds, postnasal drip, sinus pressure and sore throat.    Eyes: Negative for pain, discharge and itching.   Respiratory: Negative for cough, chest tightness, shortness of breath and wheezing.    Cardiovascular: Negative for chest pain, palpitations and  "leg swelling.   Gastrointestinal: Negative for abdominal distention, blood in stool, diarrhea, nausea and vomiting.        2/18 colonoscopy without polyps, next 2028   Endocrine: Negative for polydipsia and polyuria.   Genitourinary: Negative for difficulty urinating, dysuria, frequency and hematuria.        7/20 mammogram   Musculoskeletal: Positive for arthralgias. Negative for back pain, gait problem, joint swelling, myalgias and neck pain.   Skin: Negative for rash and wound.   Neurological: Negative for dizziness, syncope, weakness and headaches.   Psychiatric/Behavioral: Negative for dysphoric mood and sleep disturbance. The patient is not nervous/anxious.        /76   Pulse 68   Temp 97.5 °F (36.4 °C) (Infrared)   Ht 165.1 cm (65\")   Wt 77.6 kg (171 lb)   LMP  (LMP Unknown)   BMI 28.46 kg/m²       Physical Exam   Constitutional: She is oriented to person, place, and time. She appears well-developed and well-nourished.   HENT:   Head: Normocephalic and atraumatic.   Right Ear: External ear normal.   Left Ear: External ear normal.   Mouth/Throat: Oropharynx is clear and moist.   Eyes: Conjunctivae and EOM are normal.   Neck: Normal range of motion. Neck supple.   Cardiovascular: Normal rate, regular rhythm and normal heart sounds.   Pulmonary/Chest: Effort normal and breath sounds normal.   Abdominal: Soft. Bowel sounds are normal.   Musculoskeletal: Normal range of motion. She exhibits deformity (bilateral D contracture).   Lymphadenopathy:     She has no cervical adenopathy.   Neurological: She is alert and oriented to person, place, and time.   Skin: Skin is warm and dry.   Psychiatric: She has a normal mood and affect. Her behavior is normal. Thought content normal.       Procedure:      Discussion/Summary:    hyperlipidemia-labs soon, counseled on diet  htn-advised low NA and exercise, goal of <130/80, controlled  hypothryoid-lab soon  gerd-controlled on omeprazole  Diverticular dz-advised " adequate fiber intake, asymptomatic  Insomnia-controlled on trazodone  DJD/Dupuytren contracture-Celebrex prn and ortho when ready      2/3 labs noted and dw patient    Advance Care Planning   ACP discussion was held with the patient during this visit. Patient does not have an advance directive, information provided.      Current Outpatient Medications:   •  acetaminophen (TYLENOL) 325 MG tablet, Take 2 tablets by mouth Every 6 (Six) Hours As Needed for Mild Pain ., Disp: , Rfl:   •  aspirin 81 MG EC tablet, Take 81 mg by mouth Daily., Disp: , Rfl:   •  bisoprolol-hydrochlorothiazide (ZIAC) 5-6.25 MG per tablet, Take 1 tablet by mouth Daily., Disp: 90 tablet, Rfl: 3  •  calcium carbonate (OS-HARPER) 600 MG tablet, Take 600 mg by mouth Every Night., Disp: , Rfl:   •  celecoxib (CELEBREX) 200 MG capsule, Take 1 capsule by mouth Daily As Needed for Moderate Pain ., Disp: 30 capsule, Rfl: 5  •  diclofenac (FLECTOR) 1.3 % patch patch, Apply 1 patch topically 2 (Two) Times a Day., Disp: 60 patch, Rfl: 2  •  estradiol (CLIMARA) 0.1 MG/24HR patch, 0.5 patches 1 (One) Time Per Week., Disp: , Rfl:   •  levothyroxine (SYNTHROID, LEVOTHROID) 150 MCG tablet, Take 1 tablet by mouth Daily., Disp: 90 tablet, Rfl: 1  •  losartan-hydrochlorothiazide (HYZAAR) 100-25 MG per tablet, Take 1 tablet by mouth Daily., Disp: 90 tablet, Rfl: 3  •  Multiple Vitamins-Minerals (MULTIVITAMIN ADULT PO), Take 1 tablet by mouth Daily., Disp: , Rfl:   •  omeprazole (priLOSEC) 40 MG capsule, Take 1 capsule by mouth Daily., Disp: 90 capsule, Rfl: 3  •  pravastatin (PRAVACHOL) 40 MG tablet, Take 1 tablet by mouth Every Night., Disp: 90 tablet, Rfl: 1  •  traMADol (ULTRAM) 50 MG tablet, Take 1 tablet by mouth Every 6 (Six) Hours As Needed for Moderate Pain ., Disp: 120 tablet, Rfl: 2  •  traZODone (DESYREL) 50 MG tablet, Take 1 tablet by mouth At Night As Needed for Sleep., Disp: 30 tablet, Rfl: 2        Demetria was seen today for hypertension.    Diagnoses  and all orders for this visit:    Essential hypertension  -     CBC (No Diff)    GERD without esophagitis    Diverticulosis of large intestine without hemorrhage    Glucose intolerance (impaired glucose tolerance)    Other specified hypothyroidism  -     TSH    Primary osteoarthritis, unspecified site    Primary insomnia    Elevated lipids  -     Comprehensive Metabolic Panel  -     Lipid Panel    History of gastric bypass  -     Vitamin D 25 Hydroxy  -     Vitamin B12    Vitamin D deficiency  -     Vitamin D 25 Hydroxy

## 2020-08-10 DIAGNOSIS — M72.0 DUPUYTREN'S CONTRACTURE OF HAND: ICD-10-CM

## 2020-08-10 DIAGNOSIS — M19.91 PRIMARY OSTEOARTHRITIS, UNSPECIFIED SITE: ICD-10-CM

## 2020-08-10 RX ORDER — CELECOXIB 200 MG/1
200 CAPSULE ORAL DAILY PRN
Qty: 30 CAPSULE | Refills: 5 | Status: SHIPPED | OUTPATIENT
Start: 2020-08-10 | End: 2021-04-01

## 2020-09-21 DIAGNOSIS — I10 ESSENTIAL HYPERTENSION: ICD-10-CM

## 2020-09-21 RX ORDER — LOSARTAN POTASSIUM AND HYDROCHLOROTHIAZIDE 25; 100 MG/1; MG/1
1 TABLET ORAL DAILY
Qty: 90 TABLET | Refills: 3 | Status: SHIPPED | OUTPATIENT
Start: 2020-09-21 | End: 2021-09-12 | Stop reason: SDUPTHER

## 2020-10-06 DIAGNOSIS — M25.552 LEFT HIP PAIN: Primary | ICD-10-CM

## 2020-10-14 DIAGNOSIS — F51.01 PRIMARY INSOMNIA: ICD-10-CM

## 2020-10-14 RX ORDER — TRAZODONE HYDROCHLORIDE 50 MG/1
50 TABLET ORAL NIGHTLY PRN
Qty: 90 TABLET | Refills: 3 | Status: SHIPPED | OUTPATIENT
Start: 2020-10-14 | End: 2021-10-11 | Stop reason: SDUPTHER

## 2020-10-20 DIAGNOSIS — E03.8 OTHER SPECIFIED HYPOTHYROIDISM: ICD-10-CM

## 2020-10-21 RX ORDER — LEVOTHYROXINE SODIUM 0.15 MG/1
150 TABLET ORAL DAILY
Qty: 90 TABLET | Refills: 3 | Status: SHIPPED | OUTPATIENT
Start: 2020-10-21 | End: 2021-04-02 | Stop reason: SDUPTHER

## 2020-10-22 DIAGNOSIS — M15.9 PRIMARY OSTEOARTHRITIS INVOLVING MULTIPLE JOINTS: ICD-10-CM

## 2020-10-22 RX ORDER — TRAMADOL HYDROCHLORIDE 50 MG/1
50 TABLET ORAL EVERY 6 HOURS PRN
Qty: 120 TABLET | Refills: 2 | Status: SHIPPED | OUTPATIENT
Start: 2020-10-22 | End: 2021-02-19 | Stop reason: SDUPTHER

## 2020-10-28 ENCOUNTER — LAB (OUTPATIENT)
Dept: LAB | Facility: HOSPITAL | Age: 65
End: 2020-10-28

## 2020-10-28 ENCOUNTER — TREATMENT (OUTPATIENT)
Dept: PHYSICAL THERAPY | Facility: CLINIC | Age: 65
End: 2020-10-28

## 2020-10-28 DIAGNOSIS — M25.559 PAIN, HIP: Primary | ICD-10-CM

## 2020-10-28 LAB
25(OH)D3 SERPL-MCNC: 79.8 NG/ML (ref 30–100)
ALBUMIN SERPL-MCNC: 4.7 G/DL (ref 3.5–5.2)
ALBUMIN/GLOB SERPL: 2 G/DL
ALP SERPL-CCNC: 77 U/L (ref 39–117)
ALT SERPL W P-5'-P-CCNC: 39 U/L (ref 1–33)
ANION GAP SERPL CALCULATED.3IONS-SCNC: 7.7 MMOL/L (ref 5–15)
AST SERPL-CCNC: 37 U/L (ref 1–32)
BILIRUB SERPL-MCNC: 0.6 MG/DL (ref 0–1.2)
BUN SERPL-MCNC: 17 MG/DL (ref 8–23)
BUN/CREAT SERPL: 21.8 (ref 7–25)
CALCIUM SPEC-SCNC: 10.6 MG/DL (ref 8.6–10.5)
CHLORIDE SERPL-SCNC: 101 MMOL/L (ref 98–107)
CHOLEST SERPL-MCNC: 169 MG/DL (ref 0–200)
CO2 SERPL-SCNC: 32.3 MMOL/L (ref 22–29)
CREAT SERPL-MCNC: 0.78 MG/DL (ref 0.57–1)
DEPRECATED RDW RBC AUTO: 46.6 FL (ref 37–54)
ERYTHROCYTE [DISTWIDTH] IN BLOOD BY AUTOMATED COUNT: 13.8 % (ref 12.3–15.4)
GFR SERPL CREATININE-BSD FRML MDRD: 74 ML/MIN/1.73
GLOBULIN UR ELPH-MCNC: 2.4 GM/DL
GLUCOSE SERPL-MCNC: 115 MG/DL (ref 65–99)
HCT VFR BLD AUTO: 47.2 % (ref 34–46.6)
HDLC SERPL-MCNC: 82 MG/DL (ref 40–60)
HGB BLD-MCNC: 15.5 G/DL (ref 12–15.9)
LDLC SERPL CALC-MCNC: 71 MG/DL (ref 0–100)
LDLC/HDLC SERPL: 0.85 {RATIO}
MCH RBC QN AUTO: 29.8 PG (ref 26.6–33)
MCHC RBC AUTO-ENTMCNC: 32.8 G/DL (ref 31.5–35.7)
MCV RBC AUTO: 90.8 FL (ref 79–97)
PLATELET # BLD AUTO: 202 10*3/MM3 (ref 140–450)
PMV BLD AUTO: 10 FL (ref 6–12)
POTASSIUM SERPL-SCNC: 3.8 MMOL/L (ref 3.5–5.2)
PROT SERPL-MCNC: 7.1 G/DL (ref 6–8.5)
RBC # BLD AUTO: 5.2 10*6/MM3 (ref 3.77–5.28)
SODIUM SERPL-SCNC: 141 MMOL/L (ref 136–145)
TRIGL SERPL-MCNC: 87 MG/DL (ref 0–150)
TSH SERPL DL<=0.05 MIU/L-ACNC: 3.04 UIU/ML (ref 0.27–4.2)
VIT B12 BLD-MCNC: 524 PG/ML (ref 211–946)
VLDLC SERPL-MCNC: 16 MG/DL (ref 5–40)
WBC # BLD AUTO: 5.79 10*3/MM3 (ref 3.4–10.8)

## 2020-10-28 PROCEDURE — 80053 COMPREHEN METABOLIC PANEL: CPT | Performed by: INTERNAL MEDICINE

## 2020-10-28 PROCEDURE — 85027 COMPLETE CBC AUTOMATED: CPT | Performed by: INTERNAL MEDICINE

## 2020-10-28 PROCEDURE — 80061 LIPID PANEL: CPT | Performed by: INTERNAL MEDICINE

## 2020-10-28 PROCEDURE — 97161 PT EVAL LOW COMPLEX 20 MIN: CPT | Performed by: PHYSICAL THERAPIST

## 2020-10-28 PROCEDURE — 97110 THERAPEUTIC EXERCISES: CPT | Performed by: PHYSICAL THERAPIST

## 2020-10-28 PROCEDURE — 82306 VITAMIN D 25 HYDROXY: CPT | Performed by: INTERNAL MEDICINE

## 2020-10-28 PROCEDURE — 84443 ASSAY THYROID STIM HORMONE: CPT | Performed by: INTERNAL MEDICINE

## 2020-10-28 PROCEDURE — 82607 VITAMIN B-12: CPT | Performed by: INTERNAL MEDICINE

## 2020-10-28 PROCEDURE — 83036 HEMOGLOBIN GLYCOSYLATED A1C: CPT | Performed by: INTERNAL MEDICINE

## 2020-10-28 PROCEDURE — 36415 COLL VENOUS BLD VENIPUNCTURE: CPT | Performed by: INTERNAL MEDICINE

## 2020-10-28 NOTE — PROGRESS NOTES
Physical Therapy Initial Evaluation and Plan of Care      Patient: Demetria Paris   : 1955  Diagnosis/ICD-10 Code:  The encounter diagnosis was Pain, hip.   Referring practitioner: Abraham Sousa MD  Date of Initial Visit: Type: THERAPY  Noted: 10/28/2020  Today's Date: 10/28/2020  Patient seen for 1 sessions         Demetria Paris reports:  L hip pain of 3 months duration  Subjective Questionnaire: LEFS: 62/80    Subjective Evaluation    History of Present Illness  Onset date: about 3 months ago.  Mechanism of injury: unknown    Subjective comment: L low back, hip and sometimes LLE pain to ankle  Patient Occupation: retired nurse Pain  Current pain ratin  At best pain ratin  At worst pain ratin  Location: left low back and L hip, sometimes to ankle  Quality: dull ache  Relieving factors: medications (biofreeze)  Aggravating factors: sleeping (sitting)  Progression: improved    Social Support  Lives in: multiple-level home    Hand dominance: right    Diagnostic Tests  No diagnostic tests performed    Treatments  No previous or current treatments  Patient Goals  Patient goals for therapy: decreased pain           Treatment  Exercise 1  Exercise Name 1: Initial HEP education provided in clinic today         Functional Testing  Functional Tests Options: Lower Extremity Functional Index   Objective        Special Questions      Additional Special Questions  Not comfortable on either side to sleep.      Postural Observations  Seated posture: fair  Standing posture: fair  Correction of posture: makes symptoms better        Neurological Testing     Sensation     Lumbar   Left   Intact: light touch    Right   Intact: light touch    Comments   Right light touch: except near R TKA incision    Reflexes   Left   Patellar (L4): normal (2+)  Achilles (S1): normal (2+)    Right   Patellar (L4): normal (2+)  Achilles (S1): normal (2+)    Active Range of Motion   Left Hip   Normal active range of  motion    Right Hip   Normal active range of motion    Additional Active Range of Motion Details  Flexion: WNL, no change in symptoms  Extension: left LS pain, no hip pain    Strength/Myotome Testing     Lumbar   Left   Normal strength    Right   Normal strength    Left Hip   Planes of Motion   Abduction: 3+    Tests       Thoracic   Negative slump.     Lumbar     Left   Negative passive SLR.     Right   Negative passive SLR.     Left Hip   Negative OMID, femoral nerve tension and scour.           Assessment & Plan     Assessment  Impairments: activity intolerance, impaired physical strength, lacks appropriate home exercise program and pain with function  Assessment details: Clinical presentation is significant for L hip abductor weakness and likely lumbar derangement.  Pt responded well to initial exercises in clinic and is motivated to progress.  Prognosis: good  Functional Limitations: sleeping, uncomfortable because of pain and sitting  Goals  Plan Goals: 3 weeks  Pt. demonstrates independence and compliance with initial HEP.  Pt. reports reduction in pain intensity to no worse than 3/10 on NPRS.  L hip abductor strength shows improvement over baseline measure.  Pt demonstrates understanding of correct sitting posture and importance of frequent postural correction.    6 weeks  Pt. demonstrates independence in advanced HEP for ongoing improvement.  AROM of hips and lumbar spine is sufficient for performance of daily activities without increase in pain.  L hip strength is improved to 4/5 or better to allow participation in desired activities.  Functional outcome measure is improved to 70/80 points.          Plan  Therapy options: will be seen for skilled physical therapy services  Planned modality interventions: cryotherapy  Planned therapy interventions: abdominal trunk stabilization, manual therapy, postural training, strengthening, stretching, therapeutic activities, joint mobilization, home exercise program,  flexibility and body mechanics training  Frequency: 1x week  Duration in visits: 6  Treatment plan discussed with: patient  Plan details: PT weekly per POC, addressing pain and noted deficits.        Timed:  Manual Therapy:         mins  17909;  Therapeutic Exercise:    10     mins  53850;     Neuromuscular Angela:        mins  60011;    Therapeutic Activity:          mins  06757;     Gait Training:           mins  59787;     Ultrasound:          mins  18003;    Electrical Stimulation:         mins  64671 ( );    Untimed:  Electrical Stimulation:         mins  20412 ( );  Mechanical Traction:         mins  91780;     Timed Treatment:   10   mins   Total Treatment:     50   mins    PT SIGNATURE: Mi Luna, PT   DATE TREATMENT INITIATED: 10/28/2020    Initial Certification  Certification Period: 1/26/2021  I certify that the therapy services are furnished while this patient is under my care.  The services outlined above are required by this patient, and will be reviewed every 90 days.     PHYSICIAN: Abraham Sousa MD      DATE:     Please sign and return via fax to [phone].. Thank you, [facility].

## 2020-10-29 DIAGNOSIS — R73.09 ABNORMAL GLUCOSE: Primary | ICD-10-CM

## 2020-10-29 LAB — HBA1C MFR BLD: 5.9 % (ref 4.8–5.6)

## 2020-11-07 DIAGNOSIS — K21.9 GERD WITHOUT ESOPHAGITIS: ICD-10-CM

## 2020-11-09 RX ORDER — OMEPRAZOLE 40 MG/1
40 CAPSULE, DELAYED RELEASE ORAL DAILY
Qty: 90 CAPSULE | Refills: 3 | Status: SHIPPED | OUTPATIENT
Start: 2020-11-09 | End: 2021-11-29 | Stop reason: SDUPTHER

## 2020-11-11 ENCOUNTER — TREATMENT (OUTPATIENT)
Dept: PHYSICAL THERAPY | Facility: CLINIC | Age: 65
End: 2020-11-11

## 2020-11-11 DIAGNOSIS — M25.559 PAIN, HIP: Primary | ICD-10-CM

## 2020-11-11 PROCEDURE — 97140 MANUAL THERAPY 1/> REGIONS: CPT | Performed by: PHYSICAL THERAPIST

## 2020-11-11 PROCEDURE — 97110 THERAPEUTIC EXERCISES: CPT | Performed by: PHYSICAL THERAPIST

## 2020-11-11 NOTE — PROGRESS NOTES
Physical Therapy Daily Progress Note  VISIT: 2      Demetria Paris reports: very mild L hip pain today and less frequent episodes on L hip pain since her initial visit.  She has been doing HEP and feels that it is helpful.    Subjective     Treatment  Pre-treatment pain:  2  Post-treatment pain:  2  Exercise 1  Exercise Name 1: HEP review and progression  Exercise 2  Exercise Name 2: L sideplanks  Exercise 3  Exercise Name 3: sidestepping with band  Equipment/Resistance 3: red band  Exercise 4  Exercise Name 4: treadmill warm up at beginning of session  Time 4: 5'    Manual Rx 1  Manual Rx 1 Location: L hip  Manual Rx 1 Type: belt assisted leteral femoral glide and ER MWM        Objective     Assessment & Plan     Assessment  Assessment details: Pt is compliant with HEP and notices decreasing frequency of symptoms.  She demonstrated understanding of new exercises in clinic today.    Plan  Plan details: Follow up next week.  Determine need for ongoing treatment vs transition to independent HEP.               Timed:  Manual Therapy:    8     mins  21377;  Therapeutic Exercise:    20     mins  69142;     Neuromuscular Angela:        mins  50237;    Therapeutic Activity:          mins  90431;     Gait Training:           mins  60919;     Ultrasound:          mins  19116;    Electrical Stimulation:         mins  15982 ( );    Untimed:  Electrical Stimulation:         mins  24359 ( );  Mechanical Traction:         mins  62008;     Timed Treatment:   28   mins   Total Treatment:     28   mins      Mi Luna, PT  Physical Therapist

## 2020-11-18 ENCOUNTER — TREATMENT (OUTPATIENT)
Dept: PHYSICAL THERAPY | Facility: CLINIC | Age: 65
End: 2020-11-18

## 2020-11-18 DIAGNOSIS — M25.559 PAIN, HIP: Primary | ICD-10-CM

## 2020-11-18 PROCEDURE — 97110 THERAPEUTIC EXERCISES: CPT | Performed by: PHYSICAL THERAPIST

## 2020-11-18 NOTE — PROGRESS NOTES
Physical Therapy Daily Progress Note  VISIT: 3      Demetria Paris reports: flare up in symptoms while vacuuming at work on Monday evening.  She had played 18 holes of golf on the previous 2 days and felt fine.  Home exercises are going well.  She did switch to modified side plank instead of full plank.  She indicates left sided low back pain and points to LS/SI area as location of current pain.    Subjective     Treatment  Pre-treatment pain:  4  Post-treatment pain:  3  Exercise 1  Exercise Name 1: negative SI provocation tests  Exercise 2  Exercise Name 2: prone press ups-relieving to symptoms  Exercise 3  Exercise Name 3: bird dogs at wall  Exercise 4  Exercise Name 4: bridging  Exercise 5  Exercise Name 5: treadmill walk   Equipment/Resistance 5: 2 mph  Exercise 6  Exercise Name 6: stairs-mild left buttock discomfort on ascent  Exercise 7  Exercise Name 7: advice regarding performing lumbar extension before and after vacuuming             Objective          Tests     Left Pelvic Girdle/Sacrum   Negative: sacrum compression, gapping and sacral spring.         Assessment & Plan     Assessment  Assessment details: Negative SI provocation tests.  Tenderness with lumbar PA glide.  Relief of symptoms with prone press ups.  Pt has L hip weakness and lumbar derangement.    Plan  Plan details: Continue PT.  Progress exercises as symptoms response indicates.               Timed:  Manual Therapy:         mins  31822;  Therapeutic Exercise:    28     mins  54007;     Neuromuscular Angela:        mins  12466;    Therapeutic Activity:          mins  30294;     Gait Training:           mins  80460;     Ultrasound:          mins  94172;    Electrical Stimulation:         mins  32250 ( );    Untimed:  Electrical Stimulation:         mins  52809 ( );  Mechanical Traction:         mins  41063;     Timed Treatment:   28   mins   Total Treatment:     28   mins      Mi Luna, PT  Physical  Therapist

## 2020-11-23 RX ORDER — PRAVASTATIN SODIUM 40 MG
40 TABLET ORAL NIGHTLY
Qty: 90 TABLET | Refills: 3 | Status: SHIPPED | OUTPATIENT
Start: 2020-11-23 | End: 2021-11-29 | Stop reason: SDUPTHER

## 2020-12-09 ENCOUNTER — TREATMENT (OUTPATIENT)
Dept: PHYSICAL THERAPY | Facility: CLINIC | Age: 65
End: 2020-12-09

## 2020-12-09 DIAGNOSIS — M25.559 PAIN, HIP: Primary | ICD-10-CM

## 2020-12-09 PROCEDURE — 97140 MANUAL THERAPY 1/> REGIONS: CPT | Performed by: PHYSICAL THERAPIST

## 2020-12-09 PROCEDURE — 97110 THERAPEUTIC EXERCISES: CPT | Performed by: PHYSICAL THERAPIST

## 2020-12-09 NOTE — PROGRESS NOTES
Physical Therapy Daily Progress Note    Patient: Demetria Paris   : 1955  Diagnosis/ICD-10 Code:  The encounter diagnosis was Pain, hip.   Referring practitioner: No ref. provider found  Date of Initial Visit: Type: THERAPY  Noted: 10/28/2020  Today's Date: 2020  Visit:  4     Demetria Paris reports: moderate L hip pain upon arrival today.  Pain has not been completely gone in the past week.  She wonders if it is the colder weather.      Subjective     Treatment  Pre-treatment pain:  4  Post-treatment pain:  0  Exercise 1  Exercise Name 1: Reassessment completed  Exercise 2  Exercise Name 2: prone press ups  Exercise 3  Exercise Name 3: prone press ups with overpressure  Exercise 4  Exercise Name 4: pt education in HEP with helper to provide overpressure with strap    Manual Rx 1  Manual Rx 1 Location: lumbar spine  Manual Rx 1 Type: PA mobilization to bilateral and left unilateral lower lumbar segments, overpressure to prone press ups        Objective          Strength/Myotome Testing     Left Hip   Planes of Motion   Abduction: 3+        Assessment & Plan     Assessment  Assessment details: Left hip pain completely resolved with performance of press ups with overpressure in clinic today.  Pt indicates understanding that hip pain originates in low back.  She verbalizes understanding that sitting posture, frequent postural correction, and prescribed lumbar exercises are important to resolving symptoms.    Plan  Plan details: Continue PT.  Reinforce self-management strategies and progress as indicated.        Plan Goals: 3 weeks  Pt. demonstrates independence and compliance with initial HEP-met.  Pt. reports reduction in pain intensity to no worse than 3/10 on NPRS-not met.  L hip abductor strength shows improvement over baseline measure-not met.  Pt demonstrates understanding of correct sitting posture and importance of frequent postural correction-progressing.    6 weeks  Pt. demonstrates  independence in advanced HEP for ongoing improvement-ongoing.  AROM of hips and lumbar spine is sufficient for performance of daily activities without increase in pain-progressing.  L hip strength is improved to 4/5 or better to allow participation in desired activities-ongoing.  Functional outcome measure is improved to 70/80 points-pending.        Timed:  Manual Therapy:    10     mins  89546;  Therapeutic Exercise:    20     mins  23352;     Neuromuscular Angela:        mins  04901;    Therapeutic Activity:          mins  02136;     Gait Training:           mins  70377;     Ultrasound:          mins  05251;    Electrical Stimulation:         mins  20786 ( );    Untimed:  Electrical Stimulation:         mins  71323 ( );  Mechanical Traction:         mins  10966;     Timed Treatment:   30   mins   Total Treatment:     30   mins      Mi Luna PT  Physical Therapist

## 2020-12-16 ENCOUNTER — TREATMENT (OUTPATIENT)
Dept: PHYSICAL THERAPY | Facility: CLINIC | Age: 65
End: 2020-12-16

## 2020-12-16 DIAGNOSIS — M25.559 PAIN, HIP: Primary | ICD-10-CM

## 2020-12-16 PROCEDURE — 97110 THERAPEUTIC EXERCISES: CPT | Performed by: PHYSICAL THERAPIST

## 2020-12-16 PROCEDURE — 97140 MANUAL THERAPY 1/> REGIONS: CPT | Performed by: PHYSICAL THERAPIST

## 2020-12-16 PROCEDURE — 97530 THERAPEUTIC ACTIVITIES: CPT | Performed by: PHYSICAL THERAPIST

## 2020-12-16 NOTE — PROGRESS NOTES
Physical Therapy Daily Progress Note  VISIT: 5      Demetria Paris reports: mild to moderate low back pain today.  She had a couple of bad days last week.  She felt well when she left PT last Wednesday.  She had discomfort on Thursday, and even more on Friday.  She awoke with pain and went to play golf.  It took 9 holes to loosen up her back.    Subjective     Treatment  Pre-treatment pain:  4  Post-treatment pain:  3  Exercise 1  Exercise Name 1: Education regarding sleeping posture using night roll  Exercise 2  Exercise Name 2: prone press ups  Exercise 3  Exercise Name 3: prone press ups with overpressure    Manual Rx 1  Manual Rx 1 Location: lumbar spine  Manual Rx 1 Type: PA mobilization to bilateral lower lumbar segments, rotation mobilization, overpressure to prone press ups        Objective     Assessment & Plan     Assessment  Assessment details: Symptoms respond consistently to extension exercises.  Pt may need to increase frequency of performance at home, and have  assist with overpressure.    Plan  Plan details: Continue PT.  Progress force as required to achieve more lasting relief of pain.               Timed:  Manual Therapy:    10     mins  37640;  Therapeutic Exercise:    20     mins  95575;     Neuromuscular Angela:        mins  48855;    Therapeutic Activity:     10     mins  49936;     Gait Training:           mins  40383;     Ultrasound:          mins  88863;    Electrical Stimulation:         mins  38344 ( );    Untimed:  Electrical Stimulation:         mins  05303 ( );  Mechanical Traction:         mins  46447;     Timed Treatment:   40   mins   Total Treatment:     40   mins      Mi Luna, PT  Physical Therapist

## 2021-01-01 NOTE — PROGRESS NOTES
Answers for HPI/ROS submitted by the patient on 8/18/2017   Hypertension  Chronicity: chronic  Onset: more than 1 year ago  Progression since onset: waxing and waning  Condition status: controlled  anxiety: No  blurred vision: No  chest pain: No  headaches: No  malaise/fatigue: Yes  neck pain: No  orthopnea: No  palpitations: No  peripheral edema: No  PND: No  shortness of breath: No  sweats: No  Agents associated with hypertension: thyroid hormones  CAD risks: dyslipidemia, family history, obesity  Compliance problems: exercise     CASIMIRO SALINAS

## 2021-02-19 DIAGNOSIS — M15.9 PRIMARY OSTEOARTHRITIS INVOLVING MULTIPLE JOINTS: ICD-10-CM

## 2021-02-19 RX ORDER — TRAMADOL HYDROCHLORIDE 50 MG/1
50 TABLET ORAL EVERY 6 HOURS PRN
Qty: 120 TABLET | Refills: 2 | Status: SHIPPED | OUTPATIENT
Start: 2021-02-19 | End: 2021-07-18 | Stop reason: SDUPTHER

## 2021-04-01 ENCOUNTER — LAB (OUTPATIENT)
Dept: LAB | Facility: HOSPITAL | Age: 66
End: 2021-04-01

## 2021-04-01 ENCOUNTER — OFFICE VISIT (OUTPATIENT)
Dept: INTERNAL MEDICINE | Facility: CLINIC | Age: 66
End: 2021-04-01

## 2021-04-01 VITALS
SYSTOLIC BLOOD PRESSURE: 140 MMHG | DIASTOLIC BLOOD PRESSURE: 70 MMHG | WEIGHT: 179 LBS | BODY MASS INDEX: 29.82 KG/M2 | HEIGHT: 65 IN | HEART RATE: 64 BPM | TEMPERATURE: 96.8 F

## 2021-04-01 DIAGNOSIS — R73.02 GLUCOSE INTOLERANCE (IMPAIRED GLUCOSE TOLERANCE): ICD-10-CM

## 2021-04-01 DIAGNOSIS — E55.9 VITAMIN D DEFICIENCY: ICD-10-CM

## 2021-04-01 DIAGNOSIS — E03.8 OTHER SPECIFIED HYPOTHYROIDISM: ICD-10-CM

## 2021-04-01 DIAGNOSIS — K21.9 GERD WITHOUT ESOPHAGITIS: ICD-10-CM

## 2021-04-01 DIAGNOSIS — I10 ESSENTIAL HYPERTENSION: Primary | ICD-10-CM

## 2021-04-01 DIAGNOSIS — K57.30 DIVERTICULOSIS OF LARGE INTESTINE WITHOUT HEMORRHAGE: ICD-10-CM

## 2021-04-01 DIAGNOSIS — F51.01 PRIMARY INSOMNIA: ICD-10-CM

## 2021-04-01 DIAGNOSIS — E78.5 ELEVATED LIPIDS: ICD-10-CM

## 2021-04-01 LAB
25(OH)D3 SERPL-MCNC: 81.6 NG/ML
ALBUMIN SERPL-MCNC: 4.1 G/DL (ref 3.5–5.2)
ALBUMIN/GLOB SERPL: 1.7 G/DL
ALP SERPL-CCNC: 77 U/L (ref 39–117)
ALT SERPL W P-5'-P-CCNC: 28 U/L (ref 1–33)
ANION GAP SERPL CALCULATED.3IONS-SCNC: 7 MMOL/L (ref 5–15)
AST SERPL-CCNC: 27 U/L (ref 1–32)
BILIRUB SERPL-MCNC: 0.6 MG/DL (ref 0–1.2)
BUN SERPL-MCNC: 18 MG/DL (ref 8–23)
BUN/CREAT SERPL: 19.6 (ref 7–25)
CALCIUM SPEC-SCNC: 10.2 MG/DL (ref 8.6–10.5)
CHLORIDE SERPL-SCNC: 102 MMOL/L (ref 98–107)
CHOLEST SERPL-MCNC: 180 MG/DL (ref 0–200)
CO2 SERPL-SCNC: 33 MMOL/L (ref 22–29)
CREAT SERPL-MCNC: 0.92 MG/DL (ref 0.57–1)
DEPRECATED RDW RBC AUTO: 41.8 FL (ref 37–54)
ERYTHROCYTE [DISTWIDTH] IN BLOOD BY AUTOMATED COUNT: 12.7 % (ref 12.3–15.4)
GFR SERPL CREATININE-BSD FRML MDRD: 61 ML/MIN/1.73
GLOBULIN UR ELPH-MCNC: 2.4 GM/DL
GLUCOSE SERPL-MCNC: 118 MG/DL (ref 65–99)
HBA1C MFR BLD: 5.85 % (ref 4.8–5.6)
HCT VFR BLD AUTO: 42.4 % (ref 34–46.6)
HDLC SERPL-MCNC: 79 MG/DL (ref 40–60)
HGB BLD-MCNC: 14.3 G/DL (ref 12–15.9)
LDLC SERPL CALC-MCNC: 82 MG/DL (ref 0–100)
LDLC/HDLC SERPL: 1.01 {RATIO}
MCH RBC QN AUTO: 30.4 PG (ref 26.6–33)
MCHC RBC AUTO-ENTMCNC: 33.7 G/DL (ref 31.5–35.7)
MCV RBC AUTO: 90 FL (ref 79–97)
PLATELET # BLD AUTO: 219 10*3/MM3 (ref 140–450)
PMV BLD AUTO: 10 FL (ref 6–12)
POTASSIUM SERPL-SCNC: 3.8 MMOL/L (ref 3.5–5.2)
PROT SERPL-MCNC: 6.5 G/DL (ref 6–8.5)
RBC # BLD AUTO: 4.71 10*6/MM3 (ref 3.77–5.28)
SODIUM SERPL-SCNC: 142 MMOL/L (ref 136–145)
TRIGL SERPL-MCNC: 106 MG/DL (ref 0–150)
TSH SERPL DL<=0.05 MIU/L-ACNC: 6.21 UIU/ML (ref 0.27–4.2)
VIT B12 BLD-MCNC: 456 PG/ML (ref 211–946)
VLDLC SERPL-MCNC: 19 MG/DL (ref 5–40)
WBC # BLD AUTO: 6.54 10*3/MM3 (ref 3.4–10.8)

## 2021-04-01 PROCEDURE — 80061 LIPID PANEL: CPT | Performed by: INTERNAL MEDICINE

## 2021-04-01 PROCEDURE — 99214 OFFICE O/P EST MOD 30 MIN: CPT | Performed by: INTERNAL MEDICINE

## 2021-04-01 PROCEDURE — 82306 VITAMIN D 25 HYDROXY: CPT | Performed by: INTERNAL MEDICINE

## 2021-04-01 PROCEDURE — 80053 COMPREHEN METABOLIC PANEL: CPT | Performed by: INTERNAL MEDICINE

## 2021-04-01 PROCEDURE — 82607 VITAMIN B-12: CPT | Performed by: INTERNAL MEDICINE

## 2021-04-01 PROCEDURE — 83036 HEMOGLOBIN GLYCOSYLATED A1C: CPT | Performed by: INTERNAL MEDICINE

## 2021-04-01 PROCEDURE — 85027 COMPLETE CBC AUTOMATED: CPT | Performed by: INTERNAL MEDICINE

## 2021-04-01 PROCEDURE — 84443 ASSAY THYROID STIM HORMONE: CPT | Performed by: INTERNAL MEDICINE

## 2021-04-01 NOTE — PROGRESS NOTES
Patient is a 65 y.o. female who is here for a follow up of hyperlipidemia,hypertension and hypothyroidism.  Chief Complaint   Patient presents with   • Hyperlipidemia   • Hypertension   • Hypothyroidism         HPI:    Here for mgmt of HTN and hyperlipidemia and hypothyroid. Doing ok.  Energy level is good.  No hair loss or edema.  Has gained wt over the winter.  GERD is controlled.  Sleeping well.  No fever or chills.      History:     Patient Active Problem List   Diagnosis   • Elevated lipids   • GERD without esophagitis   • Hypertension   • Hypothyroidism   • Glucose intolerance (impaired glucose tolerance)   • Polycythemia   • Migraine with aura   • Joint pain   • Pre-op examination   • Diverticulosis of large intestine without hemorrhage   • Primary insomnia   • Primary osteoarthritis   • Dupuytren's contracture of hand       Past Medical History:   Diagnosis Date   • Chemical exposure     phenteramine   • Diverticulosis    • Fibroid, uterine    • Joint pain    • Migraine with aura    • Needle stick injury        Past Surgical History:   Procedure Laterality Date   • ABDOMINAL HYSTERECTOMY Bilateral 2007    Removal Of Both Ovaries   • BREAST BIOPSY Left    • BREAST BIOPSY Right    • BREAST EXCISIONAL BIOPSY     • COLONOSCOPY  02/14/2018   • GASTRIC BYPASS     • INCISIONAL BREAST BIOPSY Left 1998   • OOPHORECTOMY     • TOTAL KNEE ARTHROPLASTY Right    • TUBAL ABDOMINAL LIGATION  1980       Current Outpatient Medications on File Prior to Visit   Medication Sig   • acetaminophen (TYLENOL) 325 MG tablet Take 2 tablets by mouth Every 6 (Six) Hours As Needed for Mild Pain .   • aspirin 81 MG EC tablet Take 81 mg by mouth Daily.   • bisoprolol-hydrochlorothiazide (ZIAC) 5-6.25 MG per tablet Take 1 tablet by mouth Daily.   • calcium carbonate (OS-HARPER) 600 MG tablet Take 600 mg by mouth Every Night.   • diclofenac (FLECTOR) 1.3 % patch patch Apply 1 patch topically 2 (Two) Times a Day.   • estradiol (CLIMARA) 0.1  MG/24HR patch 0.5 patches 1 (One) Time Per Week.   • losartan-hydrochlorothiazide (HYZAAR) 100-25 MG per tablet Take 1 tablet by mouth Daily.   • Multiple Vitamins-Minerals (MULTIVITAMIN ADULT PO) Take 1 tablet by mouth Daily.   • omeprazole (priLOSEC) 40 MG capsule Take 1 capsule by mouth Daily.   • pravastatin (PRAVACHOL) 40 MG tablet Take 1 tablet by mouth Every Night.   • traMADol (ULTRAM) 50 MG tablet Take 1 tablet by mouth Every 6 (Six) Hours As Needed for Moderate Pain .   • traZODone (DESYREL) 50 MG tablet Take 1 tablet by mouth At Night As Needed for Sleep.   • [DISCONTINUED] levothyroxine (SYNTHROID, LEVOTHROID) 150 MCG tablet Take 1 tablet by mouth Daily.     No current facility-administered medications on file prior to visit.       Family History   Problem Relation Age of Onset   • Heart disease Other    • Diabetes Other    • Stroke Other    • Breast cancer Other    • Breast cancer Paternal Aunt 50   • Diabetes Maternal Grandmother    • Stroke Maternal Grandfather    • Heart disease Paternal Grandfather        Social History     Socioeconomic History   • Marital status:      Spouse name: Not on file   • Number of children: Not on file   • Years of education: Not on file   • Highest education level: Not on file   Tobacco Use   • Smoking status: Former Smoker   Substance and Sexual Activity   • Drug use: No   • Sexual activity: Defer         Review of Systems   Constitutional: Positive for unexpected weight change. Negative for diaphoresis and fatigue.   HENT: Negative for congestion, ear pain, facial swelling, hearing loss, nosebleeds, postnasal drip, sinus pressure and sore throat.    Eyes: Negative for pain, discharge and itching.   Respiratory: Negative for cough, chest tightness, shortness of breath and wheezing.    Cardiovascular: Negative for chest pain, palpitations and leg swelling.   Gastrointestinal: Negative for abdominal distention, blood in stool, diarrhea, nausea and vomiting.         "2/18 colonoscopy without polyps, next 2028   Endocrine: Negative for polydipsia and polyuria.   Genitourinary: Negative for difficulty urinating, dysuria, frequency and hematuria.        7/20 mammogram   Musculoskeletal: Positive for arthralgias. Negative for back pain, gait problem, joint swelling, myalgias and neck pain.   Skin: Negative for rash and wound.   Neurological: Negative for dizziness, syncope, weakness and headaches.   Psychiatric/Behavioral: Negative for dysphoric mood and sleep disturbance. The patient is not nervous/anxious.        /70   Pulse 64   Temp 96.8 °F (36 °C) (Infrared)   Ht 165.1 cm (65\")   Wt 81.2 kg (179 lb)   LMP  (LMP Unknown)   BMI 29.79 kg/m²       Physical Exam  Constitutional:       Appearance: Normal appearance. She is well-developed.   HENT:      Head: Normocephalic and atraumatic.      Right Ear: External ear normal.      Left Ear: External ear normal.      Nose: Nose normal.      Mouth/Throat:      Mouth: Mucous membranes are moist.      Pharynx: Oropharynx is clear.   Eyes:      Extraocular Movements: Extraocular movements intact.      Conjunctiva/sclera: Conjunctivae normal.      Pupils: Pupils are equal, round, and reactive to light.   Cardiovascular:      Rate and Rhythm: Normal rate and regular rhythm.      Heart sounds: Normal heart sounds.   Pulmonary:      Effort: Pulmonary effort is normal.      Breath sounds: Normal breath sounds.   Abdominal:      General: Bowel sounds are normal.      Palpations: Abdomen is soft.   Musculoskeletal:         General: Normal range of motion.      Cervical back: Normal range of motion and neck supple.   Lymphadenopathy:      Cervical: No cervical adenopathy.   Skin:     General: Skin is warm and dry.   Neurological:      General: No focal deficit present.      Mental Status: She is alert and oriented to person, place, and time.   Psychiatric:         Mood and Affect: Mood normal.         Behavior: Behavior normal.         " Thought Content: Thought content normal.         Procedure:      Discussion/Summary:    hyperlipidemia-labs today at goal, counseled on diet  htn-advised low NA and exercise, goal of <130/80, counseled on wt loss efforts  hypothryoid-lab today , will increase to 175 mcg  Glucose intolerance-advised low carb/ncs  gerd-controlled on omeprazole  Diverticular dz-advised adequate fiber intake, asymptomatic  Insomnia-controlled on trazodone  DJD/Dupuytren contracture-Celebrex prn and ortho when ready  Vit def-recheck given hx of gastric bypass, at goal      4/1 labs noted and dw patient    Current Outpatient Medications:   •  acetaminophen (TYLENOL) 325 MG tablet, Take 2 tablets by mouth Every 6 (Six) Hours As Needed for Mild Pain ., Disp: , Rfl:   •  aspirin 81 MG EC tablet, Take 81 mg by mouth Daily., Disp: , Rfl:   •  bisoprolol-hydrochlorothiazide (ZIAC) 5-6.25 MG per tablet, Take 1 tablet by mouth Daily., Disp: 90 tablet, Rfl: 3  •  calcium carbonate (OS-HARPER) 600 MG tablet, Take 600 mg by mouth Every Night., Disp: , Rfl:   •  diclofenac (FLECTOR) 1.3 % patch patch, Apply 1 patch topically 2 (Two) Times a Day., Disp: 60 patch, Rfl: 2  •  estradiol (CLIMARA) 0.1 MG/24HR patch, 0.5 patches 1 (One) Time Per Week., Disp: , Rfl:   •  levothyroxine (SYNTHROID, LEVOTHROID) 175 MCG tablet, Take 1 tablet by mouth Every Morning., Disp: 90 tablet, Rfl: 3  •  losartan-hydrochlorothiazide (HYZAAR) 100-25 MG per tablet, Take 1 tablet by mouth Daily., Disp: 90 tablet, Rfl: 3  •  Multiple Vitamins-Minerals (MULTIVITAMIN ADULT PO), Take 1 tablet by mouth Daily., Disp: , Rfl:   •  omeprazole (priLOSEC) 40 MG capsule, Take 1 capsule by mouth Daily., Disp: 90 capsule, Rfl: 3  •  pravastatin (PRAVACHOL) 40 MG tablet, Take 1 tablet by mouth Every Night., Disp: 90 tablet, Rfl: 3  •  traMADol (ULTRAM) 50 MG tablet, Take 1 tablet by mouth Every 6 (Six) Hours As Needed for Moderate Pain ., Disp: 120 tablet, Rfl: 2  •  traZODone (DESYREL) 50 MG  tablet, Take 1 tablet by mouth At Night As Needed for Sleep., Disp: 90 tablet, Rfl: 3        Diagnoses and all orders for this visit:    1. Essential hypertension (Primary)  -     CBC (No Diff)    2. Elevated lipids  -     Comprehensive Metabolic Panel  -     Lipid Panel    3. Other specified hypothyroidism  -     TSH  -     levothyroxine (SYNTHROID, LEVOTHROID) 175 MCG tablet; Take 1 tablet by mouth Every Morning.  Dispense: 90 tablet; Refill: 3    4. Glucose intolerance (impaired glucose tolerance)  -     Hemoglobin A1c    5. GERD without esophagitis  -     Vitamin B12    6. Diverticulosis of large intestine without hemorrhage    7. Primary insomnia    8. Vitamin D deficiency  -     Vitamin D 25 Hydroxy

## 2021-04-02 RX ORDER — LEVOTHYROXINE SODIUM 175 UG/1
175 TABLET ORAL EVERY MORNING
Qty: 90 TABLET | Refills: 3 | Status: SHIPPED | OUTPATIENT
Start: 2021-04-02 | End: 2022-04-04 | Stop reason: SDUPTHER

## 2021-06-07 ENCOUNTER — TRANSCRIBE ORDERS (OUTPATIENT)
Dept: ADMINISTRATIVE | Facility: HOSPITAL | Age: 66
End: 2021-06-07

## 2021-06-07 DIAGNOSIS — Z12.31 VISIT FOR SCREENING MAMMOGRAM: Primary | ICD-10-CM

## 2021-07-18 DIAGNOSIS — M15.9 PRIMARY OSTEOARTHRITIS INVOLVING MULTIPLE JOINTS: ICD-10-CM

## 2021-07-18 DIAGNOSIS — I10 ESSENTIAL HYPERTENSION: ICD-10-CM

## 2021-07-19 RX ORDER — BISOPROLOL FUMARATE AND HYDROCHLOROTHIAZIDE 5; 6.25 MG/1; MG/1
1 TABLET ORAL DAILY
Qty: 90 TABLET | Refills: 3 | Status: SHIPPED | OUTPATIENT
Start: 2021-07-19 | End: 2022-07-27 | Stop reason: SDUPTHER

## 2021-07-19 RX ORDER — TRAMADOL HYDROCHLORIDE 50 MG/1
50 TABLET ORAL EVERY 6 HOURS PRN
Qty: 120 TABLET | Refills: 2 | Status: SHIPPED | OUTPATIENT
Start: 2021-07-19 | End: 2021-11-29 | Stop reason: SDUPTHER

## 2021-08-05 ENCOUNTER — APPOINTMENT (OUTPATIENT)
Dept: MAMMOGRAPHY | Facility: HOSPITAL | Age: 66
End: 2021-08-05

## 2021-09-12 DIAGNOSIS — I10 ESSENTIAL HYPERTENSION: ICD-10-CM

## 2021-09-13 RX ORDER — LOSARTAN POTASSIUM AND HYDROCHLOROTHIAZIDE 25; 100 MG/1; MG/1
1 TABLET ORAL DAILY
Qty: 90 TABLET | Refills: 3 | Status: SHIPPED | OUTPATIENT
Start: 2021-09-13 | End: 2022-09-18 | Stop reason: SDUPTHER

## 2021-09-29 ENCOUNTER — HOSPITAL ENCOUNTER (OUTPATIENT)
Dept: MAMMOGRAPHY | Facility: HOSPITAL | Age: 66
Discharge: HOME | End: 2021-09-29
Admitting: NURSE PRACTITIONER

## 2021-09-29 DIAGNOSIS — Z12.31 VISIT FOR SCREENING MAMMOGRAM: ICD-10-CM

## 2021-09-29 PROCEDURE — 77067 SCR MAMMO BI INCL CAD: CPT | Performed by: RADIOLOGY

## 2021-09-29 PROCEDURE — 77067 SCR MAMMO BI INCL CAD: CPT

## 2021-09-29 PROCEDURE — 77063 BREAST TOMOSYNTHESIS BI: CPT

## 2021-09-29 PROCEDURE — 77063 BREAST TOMOSYNTHESIS BI: CPT | Performed by: RADIOLOGY

## 2021-10-06 ENCOUNTER — OFFICE VISIT (OUTPATIENT)
Dept: INTERNAL MEDICINE | Facility: CLINIC | Age: 66
End: 2021-10-06

## 2021-10-06 VITALS
WEIGHT: 175 LBS | HEART RATE: 59 BPM | SYSTOLIC BLOOD PRESSURE: 130 MMHG | HEIGHT: 65 IN | BODY MASS INDEX: 29.16 KG/M2 | OXYGEN SATURATION: 98 % | DIASTOLIC BLOOD PRESSURE: 70 MMHG | TEMPERATURE: 97.1 F

## 2021-10-06 DIAGNOSIS — I10 PRIMARY HYPERTENSION: Primary | ICD-10-CM

## 2021-10-06 DIAGNOSIS — R73.02 GLUCOSE INTOLERANCE (IMPAIRED GLUCOSE TOLERANCE): ICD-10-CM

## 2021-10-06 DIAGNOSIS — E55.9 VITAMIN D DEFICIENCY: ICD-10-CM

## 2021-10-06 DIAGNOSIS — E78.5 ELEVATED LIPIDS: ICD-10-CM

## 2021-10-06 DIAGNOSIS — K21.9 GERD WITHOUT ESOPHAGITIS: ICD-10-CM

## 2021-10-06 DIAGNOSIS — K57.30 DIVERTICULOSIS OF LARGE INTESTINE WITHOUT HEMORRHAGE: ICD-10-CM

## 2021-10-06 DIAGNOSIS — Z00.00 ENCOUNTER FOR MEDICARE ANNUAL WELLNESS EXAM: ICD-10-CM

## 2021-10-06 DIAGNOSIS — E03.8 OTHER SPECIFIED HYPOTHYROIDISM: ICD-10-CM

## 2021-10-06 PROCEDURE — G0439 PPPS, SUBSEQ VISIT: HCPCS | Performed by: INTERNAL MEDICINE

## 2021-10-06 NOTE — PROGRESS NOTES
The ABCs of the Annual Wellness Visit  Subsequent Medicare Wellness Visit    Chief Complaint   Patient presents with   • Annual Exam      Subjective    History of Present Illness:  Demetria Paris is a 65 y.o. female who presents for a Subsequent Medicare Wellness Visit.    The following portions of the patient's history were reviewed and   updated as appropriate: current medications, past family history, past medical history, past social history, past surgical history and problem list.       Compared to one year ago, the patient feels her physical   health is the same.    Compared to one year ago, the patient feels her mental   health is the same.    Recent Hospitalizations:  She was not admitted to the hospital during the last year.       Current Medical Providers:  Patient Care Team:  Abraham Sousa MD as PCP - General    Outpatient Medications Prior to Visit   Medication Sig Dispense Refill   • acetaminophen (TYLENOL) 325 MG tablet Take 2 tablets by mouth Every 6 (Six) Hours As Needed for Mild Pain .     • aspirin 81 MG EC tablet Take 81 mg by mouth Daily.     • bisoprolol-hydrochlorothiazide (ZIAC) 5-6.25 MG per tablet Take 1 tablet by mouth Daily. 90 tablet 3   • calcium carbonate (OS-HARPER) 600 MG tablet Take 600 mg by mouth Every Night.     • diclofenac (FLECTOR) 1.3 % patch patch Apply 1 patch topically 2 (Two) Times a Day. 60 patch 2   • estradiol (CLIMARA) 0.1 MG/24HR patch 0.5 patches 1 (One) Time Per Week.     • levothyroxine (SYNTHROID, LEVOTHROID) 175 MCG tablet Take 1 tablet by mouth Every Morning. 90 tablet 3   • losartan-hydrochlorothiazide (HYZAAR) 100-25 MG per tablet Take 1 tablet by mouth Daily. 90 tablet 3   • Multiple Vitamins-Minerals (MULTIVITAMIN ADULT PO) Take 1 tablet by mouth Daily.     • omeprazole (priLOSEC) 40 MG capsule Take 1 capsule by mouth Daily. 90 capsule 3   • pravastatin (PRAVACHOL) 40 MG tablet Take 1 tablet by mouth Every Night. 90 tablet 3   • traMADol (ULTRAM) 50 MG  tablet Take 1 tablet by mouth Every 6 (Six) Hours As Needed for Moderate Pain . 120 tablet 2   • traZODone (DESYREL) 50 MG tablet Take 1 tablet by mouth At Night As Needed for Sleep. 90 tablet 3     No facility-administered medications prior to visit.       Opioid medication/s are on active medication list.  and I have evaluated her active treatment plan and pain score trends (see table).  Vitals:    10/06/21 1356   PainSc:   2     I have reviewed the chart for potential of high risk medication and harmful drug interactions in the elderly.            Aspirin is on active medication list. Aspirin use is not indicated based on review of current medical condition/s. Risk of harm outweighs potential benefits. Patient instructed to discontinue this medication.  .        Fall Risk Assessment was completed, and patient is at low risk for falls.      Patient Active Problem List   Diagnosis   • Elevated lipids   • GERD without esophagitis   • Hypertension   • Hypothyroidism   • Glucose intolerance (impaired glucose tolerance)   • Polycythemia   • Migraine with aura   • Joint pain   • Pre-op examination   • Diverticulosis of large intestine without hemorrhage   • Primary insomnia   • Primary osteoarthritis   • Dupuytren's contracture of hand     Advance Care Planning     Advance Directive is not on file.  ACP discussion was held with the patient during this visit. Patient does not have an advance directive, information provided.    Review of Systems   Constitutional: Negative for diaphoresis and fatigue.   HENT: Negative for congestion, ear pain, facial swelling, hearing loss, nosebleeds, postnasal drip, sinus pressure and sore throat.    Eyes: Negative for pain, discharge and itching.   Respiratory: Negative for cough, chest tightness, shortness of breath and wheezing.    Cardiovascular: Negative for chest pain, palpitations and leg swelling.   Gastrointestinal: Negative for abdominal distention, blood in stool, diarrhea,  "nausea and vomiting.        2/18 colonoscopy without polyps, next 2028   Endocrine: Negative for polydipsia and polyuria.   Genitourinary: Negative for difficulty urinating, dysuria, frequency and hematuria.        10/21 mammogram   Musculoskeletal: Positive for arthralgias. Negative for back pain, gait problem, joint swelling, myalgias and neck pain.   Skin: Negative for rash and wound.   Neurological: Negative for dizziness, syncope, weakness and headaches.   Psychiatric/Behavioral: Negative for dysphoric mood and sleep disturbance. The patient is not nervous/anxious.          Objective       Vitals:    10/06/21 1356 10/06/21 1416   BP: 120/68 130/70   BP Location: Left arm    Patient Position: Sitting    Pulse: 59    Temp: 97.1 °F (36.2 °C)    TempSrc: Infrared    SpO2: 98%    Weight: 79.4 kg (175 lb)    Height: 165.1 cm (65\")    PainSc:   2      Body mass index is 29.12 kg/m².  BMI has not been calculated during today's encounter.     Does the patient have evidence of cognitive impairment? No    Physical Exam  Constitutional:       Appearance: Normal appearance. She is well-developed.   HENT:      Head: Normocephalic and atraumatic.      Right Ear: External ear normal.      Left Ear: External ear normal.      Nose: Nose normal.      Mouth/Throat:      Mouth: Mucous membranes are moist.      Pharynx: Oropharynx is clear.   Eyes:      Extraocular Movements: Extraocular movements intact.      Conjunctiva/sclera: Conjunctivae normal.      Pupils: Pupils are equal, round, and reactive to light.   Cardiovascular:      Rate and Rhythm: Normal rate and regular rhythm.      Heart sounds: Normal heart sounds.   Pulmonary:      Effort: Pulmonary effort is normal.      Breath sounds: Normal breath sounds.   Abdominal:      General: Bowel sounds are normal.      Palpations: Abdomen is soft.   Musculoskeletal:         General: Normal range of motion.      Cervical back: Normal range of motion and neck supple. "   Lymphadenopathy:      Cervical: No cervical adenopathy.   Skin:     General: Skin is warm and dry.   Neurological:      General: No focal deficit present.      Mental Status: She is alert and oriented to person, place, and time.   Psychiatric:         Mood and Affect: Mood normal.         Behavior: Behavior normal.         Thought Content: Thought content normal.                 HEALTH RISK ASSESSMENT    Smoking Status:  Social History     Tobacco Use   Smoking Status Former Smoker     Alcohol Consumption:  Social History     Substance and Sexual Activity   Alcohol Use None     Fall Risk Screen:    [facility] Fall Risk Assessment was completed, and patient is at LOW risk for falls.Assessment completed on:10/6/2021    Depression Screening:  PHQ-2/PHQ-9 Depression Screening 10/6/2021   Little interest or pleasure in doing things 0   Feeling down, depressed, or hopeless 0   Trouble falling or staying asleep, or sleeping too much 1   Feeling tired or having little energy 1   Poor appetite or overeating 0   Feeling bad about yourself - or that you are a failure or have let yourself or your family down 0   Trouble concentrating on things, such as reading the newspaper or watching television 0   Moving or speaking so slowly that other people could have noticed. Or the opposite - being so fidgety or restless that you have been moving around a lot more than usual 0   Thoughts that you would be better off dead, or of hurting yourself in some way 0   Total Score 2   If you checked off any problems, how difficult have these problems made it for you to do your work, take care of things at home, or get along with other people? Not difficult at all       Health Habits and Functional and Cognitive Screening:  Functional & Cognitive Status 10/6/2021   Do you have difficulty preparing food and eating? No   Do you have difficulty bathing yourself, getting dressed or grooming yourself? No   Do you have difficulty using the toilet? No    Do you have difficulty moving around from place to place? No   Do you have trouble with steps or getting out of a bed or a chair? No   Current Diet Well Balanced Diet   Dental Exam Not up to date   Eye Exam Up to date   Exercise (times per week) 4 times per week   Current Exercises Include Walking   Do you need help using the phone?  No   Are you deaf or do you have serious difficulty hearing?  No   Do you need help with transportation? No   Do you need help shopping? No   Do you need help preparing meals?  No   Do you need help with housework?  No   Do you need help with laundry? No   Do you need help taking your medications? No   Do you need help managing money? No   Do you ever drive or ride in a car without wearing a seat belt? No   Have you felt unusual stress, anger or loneliness in the last month? No   Who do you live with? Spouse   If you need help, do you have trouble finding someone available to you? No   Have you been bothered in the last four weeks by sexual problems? No   Do you have difficulty concentrating, remembering or making decisions? No       Age-appropriate Screening Schedule:  Refer to the list below for future screening recommendations based on patient's age, sex and/or medical conditions. Orders for these recommended tests are listed in the plan section. The patient has been provided with a written plan.    Health Maintenance   Topic Date Due   • TDAP/TD VACCINES (1 - Tdap) Never done   • ZOSTER VACCINE (1 of 2) Never done   • PAP SMEAR  Never done   • DXA SCAN  12/15/2018   • INFLUENZA VACCINE  09/01/2021   • MAMMOGRAM  09/29/2023              Assessment/Plan        CMS Preventative Services Quick Reference  Risk Factors Identified During Encounter  Cardiovascular Disease  Chronic Pain   Immunizations Discussed/Encouraged (specific Immunizations; Td, Hepatitis A Vaccine/Series, Influenza and Shingrix  Obesity/Overweight   Polypharmacy  The above risks/problems have been discussed with the  patient.  Follow up actions/plans if indicated are seen below in the Assessment/Plan Section.  Pertinent information has been shared with the patient in the After Visit Summary.    Diagnoses and all orders for this visit:    1. Primary hypertension (Primary)  -     CBC (No Diff); Future  -     Comprehensive Metabolic Panel; Future    2. Elevated lipids  -     Lipid Panel; Future    3. Other specified hypothyroidism  -     TSH; Future    4. Glucose intolerance (impaired glucose tolerance)  -     Hemoglobin A1c; Future    5. GERD without esophagitis  -     Vitamin B12; Future    6. Diverticulosis of large intestine without hemorrhage    7. Encounter for Medicare annual wellness exam  -     CBC (No Diff); Future  -     Comprehensive Metabolic Panel; Future  -     Lipid Panel; Future  -     TSH; Future  -     Vitamin D 25 Hydroxy; Future  -     Vitamin B12; Future  -     Hemoglobin A1c; Future    8. Vitamin D deficiency  -     Vitamin D 25 Hydroxy; Future        Follow Up:   No follow-ups on file.     An After Visit Summary and PPPS were given to the patient.           hyperlipidemia-labs at goal, counseled on diet  htn-advised low NA and exercise, goal of <130/80, counseled on wt loss efforts  hypothryoid-lab soon  Glucose intolerance-advised low carb/ncs  gerd-controlled on omeprazole  Diverticular dz-advised adequate fiber intake, asymptomatic  Insomnia-controlled on trazodone  DJD/Dupuytren contracture-Celebrex prn and ortho when ready  Vit def-recheck given hx of gastric bypass, at goal      4/1 labs noted and dw patient

## 2021-10-11 DIAGNOSIS — F51.01 PRIMARY INSOMNIA: ICD-10-CM

## 2021-10-11 RX ORDER — TRAZODONE HYDROCHLORIDE 50 MG/1
50 TABLET ORAL NIGHTLY PRN
Qty: 90 TABLET | Refills: 3 | Status: SHIPPED | OUTPATIENT
Start: 2021-10-11 | End: 2023-03-17

## 2021-10-29 ENCOUNTER — HOSPITAL ENCOUNTER (OUTPATIENT)
Dept: MAMMOGRAPHY | Facility: HOSPITAL | Age: 66
Discharge: HOME | End: 2021-10-29
Admitting: RADIOLOGY

## 2021-10-29 DIAGNOSIS — R92.8 ABNORMAL MAMMOGRAM: ICD-10-CM

## 2021-10-29 PROCEDURE — G0279 TOMOSYNTHESIS, MAMMO: HCPCS | Performed by: RADIOLOGY

## 2021-10-29 PROCEDURE — 77065 DX MAMMO INCL CAD UNI: CPT | Performed by: RADIOLOGY

## 2021-10-29 PROCEDURE — G0279 TOMOSYNTHESIS, MAMMO: HCPCS

## 2021-10-29 PROCEDURE — 77065 DX MAMMO INCL CAD UNI: CPT

## 2021-11-29 DIAGNOSIS — M15.9 PRIMARY OSTEOARTHRITIS INVOLVING MULTIPLE JOINTS: ICD-10-CM

## 2021-11-29 DIAGNOSIS — K21.9 GERD WITHOUT ESOPHAGITIS: ICD-10-CM

## 2021-11-29 RX ORDER — PRAVASTATIN SODIUM 40 MG
40 TABLET ORAL NIGHTLY
Qty: 90 TABLET | Refills: 3 | Status: SHIPPED | OUTPATIENT
Start: 2021-11-29 | End: 2023-01-09 | Stop reason: SDUPTHER

## 2021-11-29 RX ORDER — OMEPRAZOLE 40 MG/1
40 CAPSULE, DELAYED RELEASE ORAL DAILY
Qty: 90 CAPSULE | Refills: 3 | Status: SHIPPED | OUTPATIENT
Start: 2021-11-29 | End: 2022-12-04 | Stop reason: SDUPTHER

## 2021-11-29 RX ORDER — TRAMADOL HYDROCHLORIDE 50 MG/1
50 TABLET ORAL EVERY 6 HOURS PRN
Qty: 120 TABLET | Refills: 2 | Status: SHIPPED | OUTPATIENT
Start: 2021-11-29 | End: 2022-04-28 | Stop reason: SDUPTHER

## 2022-04-04 DIAGNOSIS — E03.8 OTHER SPECIFIED HYPOTHYROIDISM: ICD-10-CM

## 2022-04-04 RX ORDER — LEVOTHYROXINE SODIUM 175 UG/1
175 TABLET ORAL EVERY MORNING
Qty: 90 TABLET | Refills: 3 | Status: SHIPPED | OUTPATIENT
Start: 2022-04-04 | End: 2023-03-26 | Stop reason: SDUPTHER

## 2022-04-27 ENCOUNTER — OFFICE VISIT (OUTPATIENT)
Dept: INTERNAL MEDICINE | Facility: CLINIC | Age: 67
End: 2022-04-27

## 2022-04-27 ENCOUNTER — LAB (OUTPATIENT)
Dept: LAB | Facility: HOSPITAL | Age: 67
End: 2022-04-27

## 2022-04-27 VITALS
SYSTOLIC BLOOD PRESSURE: 130 MMHG | DIASTOLIC BLOOD PRESSURE: 72 MMHG | WEIGHT: 177 LBS | OXYGEN SATURATION: 98 % | TEMPERATURE: 96.9 F | HEART RATE: 58 BPM | HEIGHT: 65 IN | BODY MASS INDEX: 29.49 KG/M2

## 2022-04-27 DIAGNOSIS — E55.9 VITAMIN D DEFICIENCY: ICD-10-CM

## 2022-04-27 DIAGNOSIS — E78.5 ELEVATED LIPIDS: ICD-10-CM

## 2022-04-27 DIAGNOSIS — I10 PRIMARY HYPERTENSION: Primary | ICD-10-CM

## 2022-04-27 DIAGNOSIS — E03.8 OTHER SPECIFIED HYPOTHYROIDISM: ICD-10-CM

## 2022-04-27 DIAGNOSIS — M19.91 PRIMARY OSTEOARTHRITIS, UNSPECIFIED SITE: ICD-10-CM

## 2022-04-27 DIAGNOSIS — K57.30 DIVERTICULOSIS OF LARGE INTESTINE WITHOUT HEMORRHAGE: ICD-10-CM

## 2022-04-27 DIAGNOSIS — R73.02 GLUCOSE INTOLERANCE (IMPAIRED GLUCOSE TOLERANCE): ICD-10-CM

## 2022-04-27 LAB
25(OH)D3 SERPL-MCNC: 129 NG/ML (ref 30–100)
ALBUMIN SERPL-MCNC: 4.3 G/DL (ref 3.5–5.2)
ALBUMIN/GLOB SERPL: 1.8 G/DL
ALP SERPL-CCNC: 85 U/L (ref 39–117)
ALT SERPL W P-5'-P-CCNC: 22 U/L (ref 1–33)
ANION GAP SERPL CALCULATED.3IONS-SCNC: 11.7 MMOL/L (ref 5–15)
AST SERPL-CCNC: 22 U/L (ref 1–32)
BILIRUB SERPL-MCNC: 0.5 MG/DL (ref 0–1.2)
BUN SERPL-MCNC: 22 MG/DL (ref 8–23)
BUN/CREAT SERPL: 28.6 (ref 7–25)
CALCIUM SPEC-SCNC: 9.5 MG/DL (ref 8.6–10.5)
CHLORIDE SERPL-SCNC: 101 MMOL/L (ref 98–107)
CHOLEST SERPL-MCNC: 234 MG/DL (ref 0–200)
CO2 SERPL-SCNC: 27.3 MMOL/L (ref 22–29)
CREAT SERPL-MCNC: 0.77 MG/DL (ref 0.57–1)
DEPRECATED RDW RBC AUTO: 39.5 FL (ref 37–54)
EGFRCR SERPLBLD CKD-EPI 2021: 84.7 ML/MIN/1.73
ERYTHROCYTE [DISTWIDTH] IN BLOOD BY AUTOMATED COUNT: 12.9 % (ref 12.3–15.4)
GLOBULIN UR ELPH-MCNC: 2.4 GM/DL
GLUCOSE SERPL-MCNC: 103 MG/DL (ref 65–99)
HBA1C MFR BLD: 6.1 % (ref 4.8–5.6)
HCT VFR BLD AUTO: 41.4 % (ref 34–46.6)
HDLC SERPL-MCNC: 77 MG/DL (ref 40–60)
HGB BLD-MCNC: 14.1 G/DL (ref 12–15.9)
LDLC SERPL CALC-MCNC: 141 MG/DL (ref 0–100)
LDLC/HDLC SERPL: 1.8 {RATIO}
MCH RBC QN AUTO: 28.9 PG (ref 26.6–33)
MCHC RBC AUTO-ENTMCNC: 34.1 G/DL (ref 31.5–35.7)
MCV RBC AUTO: 84.8 FL (ref 79–97)
PLATELET # BLD AUTO: 215 10*3/MM3 (ref 140–450)
PMV BLD AUTO: 10.1 FL (ref 6–12)
POTASSIUM SERPL-SCNC: 3.5 MMOL/L (ref 3.5–5.2)
PROT SERPL-MCNC: 6.7 G/DL (ref 6–8.5)
RBC # BLD AUTO: 4.88 10*6/MM3 (ref 3.77–5.28)
SODIUM SERPL-SCNC: 140 MMOL/L (ref 136–145)
TRIGL SERPL-MCNC: 93 MG/DL (ref 0–150)
TSH SERPL DL<=0.05 MIU/L-ACNC: 0.1 UIU/ML (ref 0.27–4.2)
VIT B12 BLD-MCNC: 373 PG/ML (ref 211–946)
VLDLC SERPL-MCNC: 16 MG/DL (ref 5–40)
WBC NRBC COR # BLD: 5.51 10*3/MM3 (ref 3.4–10.8)

## 2022-04-27 PROCEDURE — 85027 COMPLETE CBC AUTOMATED: CPT | Performed by: INTERNAL MEDICINE

## 2022-04-27 PROCEDURE — 82306 VITAMIN D 25 HYDROXY: CPT | Performed by: INTERNAL MEDICINE

## 2022-04-27 PROCEDURE — 83036 HEMOGLOBIN GLYCOSYLATED A1C: CPT | Performed by: INTERNAL MEDICINE

## 2022-04-27 PROCEDURE — 80061 LIPID PANEL: CPT | Performed by: INTERNAL MEDICINE

## 2022-04-27 PROCEDURE — 84443 ASSAY THYROID STIM HORMONE: CPT | Performed by: INTERNAL MEDICINE

## 2022-04-27 PROCEDURE — 99214 OFFICE O/P EST MOD 30 MIN: CPT | Performed by: INTERNAL MEDICINE

## 2022-04-27 PROCEDURE — 80053 COMPREHEN METABOLIC PANEL: CPT | Performed by: INTERNAL MEDICINE

## 2022-04-27 PROCEDURE — 82607 VITAMIN B-12: CPT | Performed by: INTERNAL MEDICINE

## 2022-04-27 NOTE — PROGRESS NOTES
Patient is a 66 y.o. female who is here for a follow up of hyperlipidemia and hypertension.  Chief Complaint   Patient presents with   • Hyperlipidemia   • Hypertension         HPI:    Here for mgmt of HTN and hyperlipidemia and glucose intolerance.  Difficulty loosing wt.  Staying active.  No abdominal pains.  Energy level is fair.  Some cold intolerance.  No dizziness or lightheadedness.  BP has been good.  No HAs.     History:     Patient Active Problem List   Diagnosis   • Elevated lipids   • GERD without esophagitis   • Hypertension   • Hypothyroidism   • Glucose intolerance (impaired glucose tolerance)   • Polycythemia   • Migraine with aura   • Joint pain   • Pre-op examination   • Diverticulosis of large intestine without hemorrhage   • Primary insomnia   • Primary osteoarthritis   • Dupuytren's contracture of hand       Past Medical History:   Diagnosis Date   • Chemical exposure     phenteramine   • Diverticulosis    • Fibroid, uterine    • Joint pain    • Migraine with aura    • Needle stick injury        Past Surgical History:   Procedure Laterality Date   • ABDOMINAL HYSTERECTOMY Bilateral 2007    Removal Of Both Ovaries   • BREAST BIOPSY Left    • BREAST BIOPSY Right    • BREAST CYST ASPIRATION Right    • BREAST EXCISIONAL BIOPSY Left    • COLONOSCOPY  02/14/2018   • GASTRIC BYPASS     • INCISIONAL BREAST BIOPSY Left 1998   • OOPHORECTOMY     • TOTAL KNEE ARTHROPLASTY Right    • TUBAL ABDOMINAL LIGATION  1980       Current Outpatient Medications on File Prior to Visit   Medication Sig   • acetaminophen (TYLENOL) 325 MG tablet Take 2 tablets by mouth Every 6 (Six) Hours As Needed for Mild Pain .   • aspirin 81 MG EC tablet Take 81 mg by mouth Daily.   • bisoprolol-hydrochlorothiazide (ZIAC) 5-6.25 MG per tablet Take 1 tablet by mouth Daily.   • calcium carbonate (OS-HARPER) 600 MG tablet Take 600 mg by mouth Every Night.   • diclofenac (FLECTOR) 1.3 % patch patch Apply 1 patch topically 2 (Two) Times a  Day.   • estradiol (CLIMARA) 0.1 MG/24HR patch 0.5 patches 1 (One) Time Per Week.   • levothyroxine (SYNTHROID, LEVOTHROID) 175 MCG tablet Take 1 tablet by mouth Every Morning.   • losartan-hydrochlorothiazide (HYZAAR) 100-25 MG per tablet Take 1 tablet by mouth Daily.   • Multiple Vitamins-Minerals (MULTIVITAMIN ADULT PO) Take 1 tablet by mouth Daily.   • omeprazole (priLOSEC) 40 MG capsule Take 1 capsule by mouth Daily.   • pravastatin (PRAVACHOL) 40 MG tablet Take 1 tablet by mouth Every Night.   • traMADol (ULTRAM) 50 MG tablet Take 1 tablet by mouth Every 6 (Six) Hours As Needed for Moderate Pain .   • traZODone (DESYREL) 50 MG tablet Take 1 tablet by mouth At Night As Needed for Sleep.     No current facility-administered medications on file prior to visit.       Family History   Problem Relation Age of Onset   • Heart disease Other    • Diabetes Other    • Stroke Other    • Breast cancer Paternal Aunt 50   • Diabetes Maternal Grandmother    • Stroke Maternal Grandfather    • Heart disease Paternal Grandfather    • Ovarian cancer Neg Hx        Social History     Socioeconomic History   • Marital status:    Tobacco Use   • Smoking status: Former Smoker   Substance and Sexual Activity   • Drug use: No   • Sexual activity: Defer         Review of Systems   Constitutional: Negative for diaphoresis and fatigue.   HENT: Negative for congestion, ear pain, facial swelling, hearing loss, nosebleeds, postnasal drip, sinus pressure and sore throat.    Eyes: Negative for pain, discharge and itching.   Respiratory: Negative for cough, chest tightness, shortness of breath and wheezing.    Cardiovascular: Negative for chest pain, palpitations and leg swelling.   Gastrointestinal: Negative for abdominal distention, blood in stool, diarrhea, nausea and vomiting.        2/18 colonoscopy without polyps, next 2028   Endocrine: Negative for polydipsia and polyuria.   Genitourinary: Negative for difficulty urinating,  "dysuria, frequency and hematuria.        10/21 mammogram   Musculoskeletal: Positive for arthralgias. Negative for back pain, gait problem, joint swelling, myalgias and neck pain.   Skin: Negative for rash and wound.   Neurological: Negative for dizziness, syncope, weakness and headaches.   Psychiatric/Behavioral: Negative for dysphoric mood and sleep disturbance. The patient is not nervous/anxious.        /72   Pulse 58   Temp 96.9 °F (36.1 °C) (Infrared)   Ht 165.1 cm (65\")   Wt 80.3 kg (177 lb)   LMP  (LMP Unknown)   SpO2 98%   BMI 29.45 kg/m²       Physical Exam  Constitutional:       Appearance: Normal appearance. She is well-developed.   HENT:      Head: Normocephalic and atraumatic.      Right Ear: External ear normal.      Left Ear: External ear normal.      Nose: Nose normal.      Mouth/Throat:      Mouth: Mucous membranes are moist.      Pharynx: Oropharynx is clear.   Eyes:      Extraocular Movements: Extraocular movements intact.      Conjunctiva/sclera: Conjunctivae normal.      Pupils: Pupils are equal, round, and reactive to light.   Cardiovascular:      Rate and Rhythm: Normal rate and regular rhythm.      Heart sounds: Normal heart sounds.   Pulmonary:      Effort: Pulmonary effort is normal.      Breath sounds: Normal breath sounds.   Abdominal:      General: Bowel sounds are normal.      Palpations: Abdomen is soft.   Musculoskeletal:         General: Normal range of motion.      Cervical back: Normal range of motion and neck supple.   Lymphadenopathy:      Cervical: No cervical adenopathy.   Skin:     General: Skin is warm and dry.   Neurological:      General: No focal deficit present.      Mental Status: She is alert and oriented to person, place, and time.   Psychiatric:         Mood and Affect: Mood normal.         Behavior: Behavior normal.         Thought Content: Thought content normal.         Procedure:      Discussion/Summary:    hyperlipidemia-labs today not at goal, will " restart statin, counseled on diet  htn-advised low NA and exercise, goal of <130/80, counseled on wt loss efforts  hypothryoid-lab today, no change  Glucose intolerance-advised low carb/ncs, AIC today at goal  gerd-controlled on omeprazole  Diverticular dz-advised adequate fiber intake, asymptomatic  Insomnia-controlled on trazodone  DJD/Dupuytren contracture-Celebrex prn and ortho when ready  Vit def-recheck given hx of gastric bypass, reduce dose      4/27 labs noted and dw patient    Current Outpatient Medications:   •  acetaminophen (TYLENOL) 325 MG tablet, Take 2 tablets by mouth Every 6 (Six) Hours As Needed for Mild Pain ., Disp: , Rfl:   •  aspirin 81 MG EC tablet, Take 81 mg by mouth Daily., Disp: , Rfl:   •  bisoprolol-hydrochlorothiazide (ZIAC) 5-6.25 MG per tablet, Take 1 tablet by mouth Daily., Disp: 90 tablet, Rfl: 3  •  calcium carbonate (OS-HARPER) 600 MG tablet, Take 600 mg by mouth Every Night., Disp: , Rfl:   •  diclofenac (FLECTOR) 1.3 % patch patch, Apply 1 patch topically 2 (Two) Times a Day., Disp: 60 patch, Rfl: 2  •  estradiol (CLIMARA) 0.1 MG/24HR patch, 0.5 patches 1 (One) Time Per Week., Disp: , Rfl:   •  levothyroxine (SYNTHROID, LEVOTHROID) 175 MCG tablet, Take 1 tablet by mouth Every Morning., Disp: 90 tablet, Rfl: 3  •  losartan-hydrochlorothiazide (HYZAAR) 100-25 MG per tablet, Take 1 tablet by mouth Daily., Disp: 90 tablet, Rfl: 3  •  Multiple Vitamins-Minerals (MULTIVITAMIN ADULT PO), Take 1 tablet by mouth Daily., Disp: , Rfl:   •  omeprazole (priLOSEC) 40 MG capsule, Take 1 capsule by mouth Daily., Disp: 90 capsule, Rfl: 3  •  pravastatin (PRAVACHOL) 40 MG tablet, Take 1 tablet by mouth Every Night., Disp: 90 tablet, Rfl: 3  •  traMADol (ULTRAM) 50 MG tablet, Take 1 tablet by mouth Every 6 (Six) Hours As Needed for Moderate Pain ., Disp: 120 tablet, Rfl: 2  •  traZODone (DESYREL) 50 MG tablet, Take 1 tablet by mouth At Night As Needed for Sleep., Disp: 90 tablet, Rfl:  3        Diagnoses and all orders for this visit:    1. Primary hypertension (Primary)  -     CBC (No Diff)    2. Elevated lipids  -     Comprehensive Metabolic Panel  -     Lipid Panel    3. Other specified hypothyroidism  -     TSH    4. Glucose intolerance (impaired glucose tolerance)  -     Hemoglobin A1c    5. Diverticulosis of large intestine without hemorrhage  -     Vitamin B12    6. Primary osteoarthritis, unspecified site    7. Vitamin D deficiency  -     Vitamin D 25 Hydroxy

## 2022-04-28 DIAGNOSIS — M15.9 PRIMARY OSTEOARTHRITIS INVOLVING MULTIPLE JOINTS: ICD-10-CM

## 2022-04-28 RX ORDER — TRAMADOL HYDROCHLORIDE 50 MG/1
50 TABLET ORAL EVERY 6 HOURS PRN
Qty: 120 TABLET | Refills: 2 | Status: SHIPPED | OUTPATIENT
Start: 2022-04-28 | End: 2022-11-15 | Stop reason: SDUPTHER

## 2022-07-27 DIAGNOSIS — I10 ESSENTIAL HYPERTENSION: ICD-10-CM

## 2022-07-28 RX ORDER — BISOPROLOL FUMARATE AND HYDROCHLOROTHIAZIDE 5; 6.25 MG/1; MG/1
1 TABLET ORAL DAILY
Qty: 90 TABLET | Refills: 3 | Status: SHIPPED | OUTPATIENT
Start: 2022-07-28

## 2022-09-18 DIAGNOSIS — I10 ESSENTIAL HYPERTENSION: ICD-10-CM

## 2022-09-19 RX ORDER — LOSARTAN POTASSIUM AND HYDROCHLOROTHIAZIDE 25; 100 MG/1; MG/1
1 TABLET ORAL DAILY
Qty: 90 TABLET | Refills: 3 | Status: SHIPPED | OUTPATIENT
Start: 2022-09-19

## 2022-09-21 ENCOUNTER — TRANSCRIBE ORDERS (OUTPATIENT)
Dept: ADMINISTRATIVE | Facility: HOSPITAL | Age: 67
End: 2022-09-21

## 2022-09-21 DIAGNOSIS — Z12.31 VISIT FOR SCREENING MAMMOGRAM: Primary | ICD-10-CM

## 2022-09-22 ENCOUNTER — TRANSCRIBE ORDERS (OUTPATIENT)
Dept: ADMINISTRATIVE | Facility: HOSPITAL | Age: 67
End: 2022-09-22

## 2022-11-15 DIAGNOSIS — M15.9 PRIMARY OSTEOARTHRITIS INVOLVING MULTIPLE JOINTS: ICD-10-CM

## 2022-11-15 RX ORDER — TRAMADOL HYDROCHLORIDE 50 MG/1
50 TABLET ORAL EVERY 6 HOURS PRN
Qty: 120 TABLET | Refills: 2 | Status: SHIPPED | OUTPATIENT
Start: 2022-11-15

## 2022-11-22 ENCOUNTER — HOSPITAL ENCOUNTER (OUTPATIENT)
Dept: MAMMOGRAPHY | Facility: HOSPITAL | Age: 67
Discharge: HOME | End: 2022-11-22
Admitting: NURSE PRACTITIONER

## 2022-11-22 DIAGNOSIS — Z12.31 VISIT FOR SCREENING MAMMOGRAM: ICD-10-CM

## 2022-11-22 PROCEDURE — 77063 BREAST TOMOSYNTHESIS BI: CPT

## 2022-11-22 PROCEDURE — 77067 SCR MAMMO BI INCL CAD: CPT | Performed by: RADIOLOGY

## 2022-11-22 PROCEDURE — 77063 BREAST TOMOSYNTHESIS BI: CPT | Performed by: RADIOLOGY

## 2022-11-22 PROCEDURE — 77067 SCR MAMMO BI INCL CAD: CPT

## 2022-12-04 DIAGNOSIS — K21.9 GERD WITHOUT ESOPHAGITIS: ICD-10-CM

## 2022-12-05 RX ORDER — OMEPRAZOLE 40 MG/1
40 CAPSULE, DELAYED RELEASE ORAL DAILY
Qty: 90 CAPSULE | Refills: 3 | Status: SHIPPED | OUTPATIENT
Start: 2022-12-05

## 2023-01-10 RX ORDER — PRAVASTATIN SODIUM 40 MG
40 TABLET ORAL NIGHTLY
Qty: 90 TABLET | Refills: 3 | Status: SHIPPED | OUTPATIENT
Start: 2023-01-10

## 2023-01-26 ENCOUNTER — TELEPHONE (OUTPATIENT)
Dept: INTERNAL MEDICINE | Facility: CLINIC | Age: 68
End: 2023-01-26
Payer: MEDICARE

## 2023-01-26 DIAGNOSIS — E03.8 OTHER SPECIFIED HYPOTHYROIDISM: ICD-10-CM

## 2023-01-26 DIAGNOSIS — E78.5 ELEVATED LIPIDS: ICD-10-CM

## 2023-01-26 DIAGNOSIS — R73.02 GLUCOSE INTOLERANCE (IMPAIRED GLUCOSE TOLERANCE): ICD-10-CM

## 2023-01-26 DIAGNOSIS — I10 ESSENTIAL HYPERTENSION: Primary | ICD-10-CM

## 2023-01-26 NOTE — TELEPHONE ENCOUNTER
Caller: Demetria Paris    Relationship: Self    Best call back number: [phone]    What orders are you requesting (i.e. lab or imaging): ORDERS FASTING LABS     In what timeframe would the patient need to come in: BEFORE NEXT APPOINTMENT     Where will you receive your lab/imaging services: AT [facility]     Additional notes: LABS NEEDED BEFORE NEXT APPOINTMENT.     PLEASE CALL PATIENT WHEN ORDERS COMPLETE AND IN THE SYSTEM.

## 2023-02-16 ENCOUNTER — TELEPHONE (OUTPATIENT)
Dept: INTERNAL MEDICINE | Facility: CLINIC | Age: 68
End: 2023-02-16
Payer: MEDICARE

## 2023-02-16 DIAGNOSIS — U07.1 COVID-19 VIRUS INFECTION: Primary | ICD-10-CM

## 2023-02-16 NOTE — TELEPHONE ENCOUNTER
Caller: Demetria Paris    Relationship to patient: Self    Best call back number: [phone]    Date of exposure: 2/12/23     Date of positive COVID19 test: 2/16/23     COVID19 symptoms: SORE THROAT     Additional information or concerns: PATIENT STATES HER AND HER  WERE EXPOSED TO COVID AND SHE IS POSITIVE AND WOULD LIKE TO KNOW IF DR. SOLIS THINKS SHE NEEDS PAXLOVID     What is the patients [id number] [address] [phone] [place] [phone]

## 2023-03-15 ENCOUNTER — LAB (OUTPATIENT)
Dept: LAB | Facility: HOSPITAL | Age: 68
End: 2023-03-15
Payer: MEDICARE

## 2023-03-15 DIAGNOSIS — E78.5 ELEVATED LIPIDS: ICD-10-CM

## 2023-03-15 DIAGNOSIS — R73.02 GLUCOSE INTOLERANCE (IMPAIRED GLUCOSE TOLERANCE): ICD-10-CM

## 2023-03-15 DIAGNOSIS — E03.8 OTHER SPECIFIED HYPOTHYROIDISM: ICD-10-CM

## 2023-03-15 DIAGNOSIS — I10 ESSENTIAL HYPERTENSION: ICD-10-CM

## 2023-03-15 LAB
ALBUMIN SERPL-MCNC: 4.2 G/DL (ref 3.5–5.2)
ALBUMIN/GLOB SERPL: 1.6 G/DL
ALP SERPL-CCNC: 83 U/L (ref 39–117)
ALT SERPL W P-5'-P-CCNC: 20 U/L (ref 1–33)
ANION GAP SERPL CALCULATED.3IONS-SCNC: 9.6 MMOL/L (ref 5–15)
AST SERPL-CCNC: 25 U/L (ref 1–32)
BILIRUB SERPL-MCNC: 0.5 MG/DL (ref 0–1.2)
BUN SERPL-MCNC: 15 MG/DL (ref 8–23)
BUN/CREAT SERPL: 16.3 (ref 7–25)
CALCIUM SPEC-SCNC: 9.7 MG/DL (ref 8.6–10.5)
CHLORIDE SERPL-SCNC: 101 MMOL/L (ref 98–107)
CHOLEST SERPL-MCNC: 186 MG/DL (ref 0–200)
CO2 SERPL-SCNC: 29.4 MMOL/L (ref 22–29)
CREAT SERPL-MCNC: 0.92 MG/DL (ref 0.57–1)
DEPRECATED RDW RBC AUTO: 39.8 FL (ref 37–54)
EGFRCR SERPLBLD CKD-EPI 2021: 68 ML/MIN/1.73
ERYTHROCYTE [DISTWIDTH] IN BLOOD BY AUTOMATED COUNT: 12.8 % (ref 12.3–15.4)
GLOBULIN UR ELPH-MCNC: 2.7 GM/DL
GLUCOSE SERPL-MCNC: 122 MG/DL (ref 65–99)
HBA1C MFR BLD: 6.4 % (ref 4.8–5.6)
HCT VFR BLD AUTO: 40.6 % (ref 34–46.6)
HDLC SERPL-MCNC: 82 MG/DL (ref 40–60)
HGB BLD-MCNC: 13.7 G/DL (ref 12–15.9)
LDLC SERPL CALC-MCNC: 87 MG/DL (ref 0–100)
LDLC/HDLC SERPL: 1.04 {RATIO}
MCH RBC QN AUTO: 28.9 PG (ref 26.6–33)
MCHC RBC AUTO-ENTMCNC: 33.7 G/DL (ref 31.5–35.7)
MCV RBC AUTO: 85.7 FL (ref 79–97)
PLATELET # BLD AUTO: 237 10*3/MM3 (ref 140–450)
PMV BLD AUTO: 10 FL (ref 6–12)
POTASSIUM SERPL-SCNC: 4.2 MMOL/L (ref 3.5–5.2)
PROT SERPL-MCNC: 6.9 G/DL (ref 6–8.5)
RBC # BLD AUTO: 4.74 10*6/MM3 (ref 3.77–5.28)
SODIUM SERPL-SCNC: 140 MMOL/L (ref 136–145)
TRIGL SERPL-MCNC: 93 MG/DL (ref 0–150)
TSH SERPL DL<=0.05 MIU/L-ACNC: 1.85 UIU/ML (ref 0.27–4.2)
VLDLC SERPL-MCNC: 17 MG/DL (ref 5–40)
WBC NRBC COR # BLD: 7.81 10*3/MM3 (ref 3.4–10.8)

## 2023-03-15 PROCEDURE — 85027 COMPLETE CBC AUTOMATED: CPT

## 2023-03-15 PROCEDURE — 36415 COLL VENOUS BLD VENIPUNCTURE: CPT

## 2023-03-15 PROCEDURE — 80053 COMPREHEN METABOLIC PANEL: CPT

## 2023-03-15 PROCEDURE — 84443 ASSAY THYROID STIM HORMONE: CPT

## 2023-03-15 PROCEDURE — 83036 HEMOGLOBIN GLYCOSYLATED A1C: CPT

## 2023-03-15 PROCEDURE — 80061 LIPID PANEL: CPT

## 2023-03-17 ENCOUNTER — OFFICE VISIT (OUTPATIENT)
Dept: INTERNAL MEDICINE | Facility: CLINIC | Age: 68
End: 2023-03-17
Payer: MEDICARE

## 2023-03-17 VITALS
HEART RATE: 57 BPM | OXYGEN SATURATION: 99 % | BODY MASS INDEX: 29.99 KG/M2 | HEIGHT: 65 IN | DIASTOLIC BLOOD PRESSURE: 70 MMHG | SYSTOLIC BLOOD PRESSURE: 140 MMHG | WEIGHT: 180 LBS

## 2023-03-17 DIAGNOSIS — E78.5 ELEVATED LIPIDS: ICD-10-CM

## 2023-03-17 DIAGNOSIS — E03.8 OTHER SPECIFIED HYPOTHYROIDISM: ICD-10-CM

## 2023-03-17 DIAGNOSIS — K57.30 DIVERTICULOSIS OF LARGE INTESTINE WITHOUT HEMORRHAGE: ICD-10-CM

## 2023-03-17 DIAGNOSIS — I10 PRIMARY HYPERTENSION: Primary | ICD-10-CM

## 2023-03-17 DIAGNOSIS — R73.02 GLUCOSE INTOLERANCE (IMPAIRED GLUCOSE TOLERANCE): ICD-10-CM

## 2023-03-17 PROCEDURE — 99214 OFFICE O/P EST MOD 30 MIN: CPT | Performed by: INTERNAL MEDICINE

## 2023-03-17 PROCEDURE — 3078F DIAST BP <80 MM HG: CPT | Performed by: INTERNAL MEDICINE

## 2023-03-17 PROCEDURE — 1159F MED LIST DOCD IN RCRD: CPT | Performed by: INTERNAL MEDICINE

## 2023-03-17 PROCEDURE — 3077F SYST BP >= 140 MM HG: CPT | Performed by: INTERNAL MEDICINE

## 2023-03-17 PROCEDURE — 1160F RVW MEDS BY RX/DR IN RCRD: CPT | Performed by: INTERNAL MEDICINE

## 2023-03-17 NOTE — PROGRESS NOTES
Patient is a 67 y.o. female who is here for a follow up of hyperlipidemia and hypertension.  Chief Complaint   Patient presents with   • Hyperlipidemia   • Hypertension         HPI:    Here for mgmt of HTN and hypothyroid and hyperlipidemia.  More fatigued since Covid.  No dizziness or lightheadedness.  No HAs.  No abdominal pains.      History:     Patient Active Problem List   Diagnosis   • Elevated lipids   • GERD without esophagitis   • Hypertension   • Hypothyroidism   • Glucose intolerance (impaired glucose tolerance)   • Polycythemia   • Migraine with aura   • Joint pain   • Pre-op examination   • Diverticulosis of large intestine without hemorrhage   • Primary insomnia   • Primary osteoarthritis   • Dupuytren's contracture of hand       Past Medical History:   Diagnosis Date   • Chemical exposure     phenteramine   • Diverticulosis    • Fibroid, uterine    • Joint pain    • Migraine with aura    • Needle stick injury        Past Surgical History:   Procedure Laterality Date   • ABDOMINAL HYSTERECTOMY Bilateral 2007    Removal Of Both Ovaries   • BREAST BIOPSY Left    • BREAST BIOPSY Right    • BREAST CYST ASPIRATION Right    • BREAST EXCISIONAL BIOPSY Left    • COLONOSCOPY  02/14/2018   • GASTRIC BYPASS     • INCISIONAL BREAST BIOPSY Left 1998   • OOPHORECTOMY     • TOTAL KNEE ARTHROPLASTY Right    • TUBAL ABDOMINAL LIGATION  1980       Current Outpatient Medications on File Prior to Visit   Medication Sig   • acetaminophen (TYLENOL) 325 MG tablet Take 2 tablets by mouth Every 6 (Six) Hours As Needed for Mild Pain .   • bisoprolol-hydrochlorothiazide (ZIAC) 5-6.25 MG per tablet Take 1 tablet by mouth Daily.   • calcium carbonate (OS-HARPER) 600 MG tablet Take 1 tablet by mouth Every Night.   • estradiol (CLIMARA) 0.1 MG/24HR patch 0.5 patches 1 (One) Time Per Week.   • levothyroxine (SYNTHROID, LEVOTHROID) 175 MCG tablet Take 1 tablet by mouth Every Morning.   • losartan-hydrochlorothiazide (HYZAAR) 100-25 MG  per tablet Take 1 tablet by mouth Daily.   • Multiple Vitamins-Minerals (MULTIVITAMIN ADULT PO) Take 1 tablet by mouth Daily.   • omeprazole (priLOSEC) 40 MG capsule Take 1 capsule by mouth Daily.   • pravastatin (PRAVACHOL) 40 MG tablet Take 1 tablet by mouth Every Night.   • traMADol (ULTRAM) 50 MG tablet Take 1 tablet by mouth Every 6 (Six) Hours As Needed for Moderate Pain.   • [DISCONTINUED] aspirin 81 MG EC tablet Take 81 mg by mouth Daily.   • [DISCONTINUED] diclofenac (FLECTOR) 1.3 % patch patch Apply 1 patch topically 2 (Two) Times a Day.   • [DISCONTINUED] traZODone (DESYREL) 50 MG tablet Take 1 tablet by mouth At Night As Needed for Sleep.     No current facility-administered medications on file prior to visit.       Family History   Problem Relation Age of Onset   • Heart disease Other    • Diabetes Other    • Stroke Other    • Breast cancer Paternal Aunt 50   • Diabetes Maternal Grandmother    • Stroke Maternal Grandfather    • Heart disease Paternal Grandfather    • Ovarian cancer Neg Hx        Social History     Socioeconomic History   • Marital status:    Tobacco Use   • Smoking status: Former   Substance and Sexual Activity   • Drug use: No   • Sexual activity: Defer         Review of Systems   Constitutional: Positive for fatigue. Negative for diaphoresis.   HENT: Negative for congestion, ear pain, facial swelling, hearing loss, nosebleeds, postnasal drip, sinus pressure and sore throat.    Eyes: Negative for pain, discharge and itching.   Respiratory: Negative for cough, chest tightness, shortness of breath and wheezing.    Cardiovascular: Negative for chest pain, palpitations and leg swelling.   Gastrointestinal: Negative for abdominal distention, blood in stool, diarrhea, nausea and vomiting.        2/18 colonoscopy without polyps, next 2028   Endocrine: Negative for polydipsia and polyuria.   Genitourinary: Negative for difficulty urinating, dysuria, frequency and hematuria.        11/22  "mammogram   Musculoskeletal: Positive for arthralgias. Negative for back pain, gait problem, joint swelling, myalgias and neck pain.   Skin: Negative for rash and wound.   Neurological: Negative for dizziness, syncope, weakness and headaches.   Psychiatric/Behavioral: Negative for dysphoric mood and sleep disturbance. The patient is not nervous/anxious.        /70   Pulse 57   Ht 165.1 cm (65\")   Wt 81.6 kg (180 lb)   LMP  (LMP Unknown)   SpO2 99%   BMI 29.95 kg/m²       Physical Exam  Constitutional:       Appearance: Normal appearance. She is well-developed.   HENT:      Head: Normocephalic and atraumatic.      Right Ear: External ear normal.      Left Ear: External ear normal.      Nose: Nose normal.      Mouth/Throat:      Mouth: Mucous membranes are moist.      Pharynx: Oropharynx is clear.   Eyes:      Extraocular Movements: Extraocular movements intact.      Conjunctiva/sclera: Conjunctivae normal.      Pupils: Pupils are equal, round, and reactive to light.   Cardiovascular:      Rate and Rhythm: Normal rate and regular rhythm.      Heart sounds: Normal heart sounds.   Pulmonary:      Effort: Pulmonary effort is normal.      Breath sounds: Normal breath sounds.   Abdominal:      General: Bowel sounds are normal.      Palpations: Abdomen is soft.   Musculoskeletal:         General: Normal range of motion.      Cervical back: Normal range of motion and neck supple.   Lymphadenopathy:      Cervical: No cervical adenopathy.   Skin:     General: Skin is warm and dry.   Neurological:      General: No focal deficit present.      Mental Status: She is alert and oriented to person, place, and time.   Psychiatric:         Mood and Affect: Mood normal.         Behavior: Behavior normal.         Thought Content: Thought content normal.         Procedure:      Discussion/Summary:    hyperlipidemia-labs at goal on statin, counseled on diet  htn-advised low NA and exercise, goal of <130/80, counseled on wt loss " efforts  hypothryoid-lab at goal, no change  Glucose intolerance-advised low carb/ncs, AIC at goal  gerd-controlled on omeprazole  Diverticular dz-advised adequate fiber intake, asymptomatic  Insomnia-controlled on trazodone  DJD/Dupuytren contracture-Celebrex prn and ortho when ready  Vit def-recheck given hx of gastric bypass      recent labs noted and dw patient    Current Outpatient Medications:   •  acetaminophen (TYLENOL) 325 MG tablet, Take 2 tablets by mouth Every 6 (Six) Hours As Needed for Mild Pain ., Disp: , Rfl:   •  bisoprolol-hydrochlorothiazide (ZIAC) 5-6.25 MG per tablet, Take 1 tablet by mouth Daily., Disp: 90 tablet, Rfl: 3  •  calcium carbonate (OS-HARPER) 600 MG tablet, Take 1 tablet by mouth Every Night., Disp: , Rfl:   •  estradiol (CLIMARA) 0.1 MG/24HR patch, 0.5 patches 1 (One) Time Per Week., Disp: , Rfl:   •  levothyroxine (SYNTHROID, LEVOTHROID) 175 MCG tablet, Take 1 tablet by mouth Every Morning., Disp: 90 tablet, Rfl: 3  •  losartan-hydrochlorothiazide (HYZAAR) 100-25 MG per tablet, Take 1 tablet by mouth Daily., Disp: 90 tablet, Rfl: 3  •  Multiple Vitamins-Minerals (MULTIVITAMIN ADULT PO), Take 1 tablet by mouth Daily., Disp: , Rfl:   •  omeprazole (priLOSEC) 40 MG capsule, Take 1 capsule by mouth Daily., Disp: 90 capsule, Rfl: 3  •  pravastatin (PRAVACHOL) 40 MG tablet, Take 1 tablet by mouth Every Night., Disp: 90 tablet, Rfl: 3  •  traMADol (ULTRAM) 50 MG tablet, Take 1 tablet by mouth Every 6 (Six) Hours As Needed for Moderate Pain., Disp: 120 tablet, Rfl: 2        Diagnoses and all orders for this visit:    1. Primary hypertension (Primary)    2. Elevated lipids    3. Glucose intolerance (impaired glucose tolerance)    4. Other specified hypothyroidism    5. Diverticulosis of large intestine without hemorrhage        Answers for HPI/ROS submitted by the patient on 3/17/2023  What is the primary reason for your visit?: High Blood Pressure

## 2023-03-26 DIAGNOSIS — E03.8 OTHER SPECIFIED HYPOTHYROIDISM: ICD-10-CM

## 2023-03-27 RX ORDER — LEVOTHYROXINE SODIUM 175 UG/1
175 TABLET ORAL EVERY MORNING
Qty: 90 TABLET | Refills: 3 | Status: SHIPPED | OUTPATIENT
Start: 2023-03-27

## 2023-04-12 DIAGNOSIS — M15.9 PRIMARY OSTEOARTHRITIS INVOLVING MULTIPLE JOINTS: ICD-10-CM

## 2023-04-13 RX ORDER — TRAMADOL HYDROCHLORIDE 50 MG/1
50 TABLET ORAL EVERY 6 HOURS PRN
Qty: 120 TABLET | Refills: 2 | Status: SHIPPED | OUTPATIENT
Start: 2023-04-13

## 2023-04-13 RX ORDER — TRAZODONE HYDROCHLORIDE 50 MG/1
50 TABLET ORAL NIGHTLY
Qty: 90 TABLET | Refills: 1 | Status: SHIPPED | OUTPATIENT
Start: 2023-04-13

## 2023-07-26 ENCOUNTER — TRANSCRIBE ORDERS (OUTPATIENT)
Dept: ADMINISTRATIVE | Facility: HOSPITAL | Age: 68
End: 2023-07-26
Payer: MEDICARE

## 2023-07-26 DIAGNOSIS — N95.1 MENOPAUSAL STATE: ICD-10-CM

## 2023-07-26 DIAGNOSIS — N95.1 POST MENOPAUSAL SYNDROME: ICD-10-CM

## 2023-07-26 DIAGNOSIS — Z13.820 OSTEOPOROSIS SCREENING: Primary | ICD-10-CM

## 2023-07-28 DIAGNOSIS — I10 ESSENTIAL HYPERTENSION: ICD-10-CM

## 2023-07-28 RX ORDER — BISOPROLOL FUMARATE AND HYDROCHLOROTHIAZIDE 5; 6.25 MG/1; MG/1
TABLET ORAL
Qty: 90 TABLET | Refills: 3 | Status: SHIPPED | OUTPATIENT
Start: 2023-07-28 | End: 2023-07-31 | Stop reason: SDUPTHER

## 2023-07-31 DIAGNOSIS — I10 ESSENTIAL HYPERTENSION: ICD-10-CM

## 2023-07-31 RX ORDER — BISOPROLOL FUMARATE AND HYDROCHLOROTHIAZIDE 5; 6.25 MG/1; MG/1
1 TABLET ORAL DAILY
Qty: 90 TABLET | Refills: 3 | Status: SHIPPED | OUTPATIENT
Start: 2023-07-31

## 2023-09-08 DIAGNOSIS — E78.5 ELEVATED LIPIDS: ICD-10-CM

## 2023-09-08 DIAGNOSIS — I10 ESSENTIAL HYPERTENSION: Primary | ICD-10-CM

## 2023-09-08 DIAGNOSIS — R73.09 ABNORMAL GLUCOSE: ICD-10-CM

## 2023-09-08 DIAGNOSIS — E03.8 OTHER SPECIFIED HYPOTHYROIDISM: ICD-10-CM

## 2023-09-11 DIAGNOSIS — I10 ESSENTIAL HYPERTENSION: ICD-10-CM

## 2023-09-11 RX ORDER — LOSARTAN POTASSIUM AND HYDROCHLOROTHIAZIDE 25; 100 MG/1; MG/1
TABLET ORAL
Qty: 90 TABLET | Refills: 3 | Status: SHIPPED | OUTPATIENT
Start: 2023-09-11

## 2023-09-25 ENCOUNTER — LAB (OUTPATIENT)
Dept: LAB | Facility: HOSPITAL | Age: 68
End: 2023-09-25
Payer: MEDICARE

## 2023-09-25 DIAGNOSIS — E78.5 ELEVATED LIPIDS: ICD-10-CM

## 2023-09-25 DIAGNOSIS — R73.09 ABNORMAL GLUCOSE: ICD-10-CM

## 2023-09-25 DIAGNOSIS — I10 ESSENTIAL HYPERTENSION: ICD-10-CM

## 2023-09-25 DIAGNOSIS — E03.8 OTHER SPECIFIED HYPOTHYROIDISM: ICD-10-CM

## 2023-09-25 LAB
ALBUMIN SERPL-MCNC: 4.5 G/DL (ref 3.5–5.2)
ALBUMIN/GLOB SERPL: 2 G/DL
ALP SERPL-CCNC: 74 U/L (ref 39–117)
ALT SERPL W P-5'-P-CCNC: 29 U/L (ref 1–33)
ANION GAP SERPL CALCULATED.3IONS-SCNC: 9 MMOL/L (ref 5–15)
AST SERPL-CCNC: 34 U/L (ref 1–32)
BILIRUB SERPL-MCNC: 0.7 MG/DL (ref 0–1.2)
BILIRUB UR QL STRIP: NEGATIVE
BUN SERPL-MCNC: 16 MG/DL (ref 8–23)
BUN/CREAT SERPL: 20.3 (ref 7–25)
CALCIUM SPEC-SCNC: 10 MG/DL (ref 8.6–10.5)
CHLORIDE SERPL-SCNC: 100 MMOL/L (ref 98–107)
CHOLEST SERPL-MCNC: 152 MG/DL (ref 0–200)
CLARITY UR: CLEAR
CO2 SERPL-SCNC: 30 MMOL/L (ref 22–29)
COLOR UR: ABNORMAL
CREAT SERPL-MCNC: 0.79 MG/DL (ref 0.57–1)
DEPRECATED RDW RBC AUTO: 43 FL (ref 37–54)
EGFRCR SERPLBLD CKD-EPI 2021: 81.6 ML/MIN/1.73
ERYTHROCYTE [DISTWIDTH] IN BLOOD BY AUTOMATED COUNT: 13.7 % (ref 12.3–15.4)
GLOBULIN UR ELPH-MCNC: 2.3 GM/DL
GLUCOSE SERPL-MCNC: 113 MG/DL (ref 65–99)
GLUCOSE UR STRIP-MCNC: NEGATIVE MG/DL
HBA1C MFR BLD: 5.9 % (ref 4.8–5.6)
HCT VFR BLD AUTO: 40.8 % (ref 34–46.6)
HDLC SERPL-MCNC: 68 MG/DL (ref 40–60)
HGB BLD-MCNC: 13.7 G/DL (ref 12–15.9)
HGB UR QL STRIP.AUTO: NEGATIVE
KETONES UR QL STRIP: NEGATIVE
LDLC SERPL CALC-MCNC: 70 MG/DL (ref 0–100)
LDLC/HDLC SERPL: 1.03 {RATIO}
LEUKOCYTE ESTERASE UR QL STRIP.AUTO: NEGATIVE
MCH RBC QN AUTO: 29.4 PG (ref 26.6–33)
MCHC RBC AUTO-ENTMCNC: 33.6 G/DL (ref 31.5–35.7)
MCV RBC AUTO: 87.6 FL (ref 79–97)
NITRITE UR QL STRIP: NEGATIVE
PH UR STRIP.AUTO: 7.5 [PH] (ref 5–8)
PLATELET # BLD AUTO: 243 10*3/MM3 (ref 140–450)
PMV BLD AUTO: 10.1 FL (ref 6–12)
POTASSIUM SERPL-SCNC: 3.9 MMOL/L (ref 3.5–5.2)
PROT SERPL-MCNC: 6.8 G/DL (ref 6–8.5)
PROT UR QL STRIP: NEGATIVE
RBC # BLD AUTO: 4.66 10*6/MM3 (ref 3.77–5.28)
SODIUM SERPL-SCNC: 139 MMOL/L (ref 136–145)
SP GR UR STRIP: 1.02 (ref 1–1.03)
TRIGL SERPL-MCNC: 71 MG/DL (ref 0–150)
TSH SERPL DL<=0.05 MIU/L-ACNC: 1.2 UIU/ML (ref 0.27–4.2)
UROBILINOGEN UR QL STRIP: ABNORMAL
VIT B12 BLD-MCNC: 451 PG/ML (ref 211–946)
VLDLC SERPL-MCNC: 14 MG/DL (ref 5–40)
WBC NRBC COR # BLD: 6.42 10*3/MM3 (ref 3.4–10.8)

## 2023-09-25 PROCEDURE — 36415 COLL VENOUS BLD VENIPUNCTURE: CPT

## 2023-09-25 PROCEDURE — 83036 HEMOGLOBIN GLYCOSYLATED A1C: CPT

## 2023-09-25 PROCEDURE — 84443 ASSAY THYROID STIM HORMONE: CPT

## 2023-09-25 PROCEDURE — 80061 LIPID PANEL: CPT

## 2023-09-25 PROCEDURE — 81003 URINALYSIS AUTO W/O SCOPE: CPT

## 2023-09-25 PROCEDURE — 80053 COMPREHEN METABOLIC PANEL: CPT

## 2023-09-25 PROCEDURE — 82607 VITAMIN B-12: CPT

## 2023-09-25 PROCEDURE — 85027 COMPLETE CBC AUTOMATED: CPT

## 2023-09-26 ENCOUNTER — OFFICE VISIT (OUTPATIENT)
Dept: INTERNAL MEDICINE | Facility: CLINIC | Age: 68
End: 2023-09-26
Payer: MEDICARE

## 2023-09-26 VITALS
BODY MASS INDEX: 26.49 KG/M2 | HEART RATE: 53 BPM | DIASTOLIC BLOOD PRESSURE: 72 MMHG | SYSTOLIC BLOOD PRESSURE: 140 MMHG | WEIGHT: 159 LBS | HEIGHT: 65 IN | OXYGEN SATURATION: 97 %

## 2023-09-26 DIAGNOSIS — E03.9 HYPOTHYROIDISM, UNSPECIFIED TYPE: ICD-10-CM

## 2023-09-26 DIAGNOSIS — F51.01 PRIMARY INSOMNIA: ICD-10-CM

## 2023-09-26 DIAGNOSIS — E78.5 ELEVATED LIPIDS: ICD-10-CM

## 2023-09-26 DIAGNOSIS — Z00.00 ENCOUNTER FOR MEDICARE ANNUAL WELLNESS EXAM: ICD-10-CM

## 2023-09-26 DIAGNOSIS — K57.30 DIVERTICULOSIS OF LARGE INTESTINE WITHOUT HEMORRHAGE: ICD-10-CM

## 2023-09-26 DIAGNOSIS — I10 PRIMARY HYPERTENSION: Primary | ICD-10-CM

## 2023-09-26 DIAGNOSIS — R07.9 CHEST PAIN, UNSPECIFIED TYPE: ICD-10-CM

## 2023-09-26 DIAGNOSIS — R73.02 GLUCOSE INTOLERANCE (IMPAIRED GLUCOSE TOLERANCE): ICD-10-CM

## 2023-09-26 NOTE — PROGRESS NOTES
The ABCs of the Annual Wellness Visit  Subsequent Medicare Wellness Visit    Chief Complaint   Patient presents with    Annual Exam      Subjective    History of Present Illness:  Demetria Paris is a 67 y.o. female who presents for a Subsequent Medicare Wellness Visit.    The following portions of the patient's history were reviewed and   updated as appropriate: current medications, past family history, past medical history, past social history, past surgical history, and problem list.     Compared to one year ago, the patient feels her physical   health is better.    Compared to one year ago, the patient feels her mental   health is better.    Recent Hospitalizations:  She was not admitted to the hospital during the last year.       Current Medical Providers:  Patient Care Team:  Abraham Sousa MD as PCP - General    Outpatient Medications Prior to Visit   Medication Sig Dispense Refill    acetaminophen (TYLENOL) 325 MG tablet Take 2 tablets by mouth Every 6 (Six) Hours As Needed for Mild Pain .      bisoprolol-hydrochlorothiazide (ZIAC) 5-6.25 MG per tablet Take 1 tablet by mouth Daily. 90 tablet 3    calcium carbonate (OS-HARPER) 600 MG tablet Take 1 tablet by mouth Every Night.      estradiol (CLIMARA) 0.1 MG/24HR patch 0.5 patches 1 (One) Time Per Week.      levothyroxine (SYNTHROID, LEVOTHROID) 175 MCG tablet Take 1 tablet by mouth Every Morning. 90 tablet 3    losartan-hydrochlorothiazide (HYZAAR) 100-25 MG per tablet TAKE ONE TABLET BY MOUTH DAILY 90 tablet 3    Multiple Vitamins-Minerals (MULTIVITAMIN ADULT PO) Take 1 tablet by mouth Daily.      omeprazole (priLOSEC) 40 MG capsule Take 1 capsule by mouth Daily. 90 capsule 3    pravastatin (PRAVACHOL) 40 MG tablet Take 1 tablet by mouth Every Night. 90 tablet 3    traMADol (ULTRAM) 50 MG tablet Take 1 tablet by mouth Every 6 (Six) Hours As Needed for Moderate Pain. 120 tablet 2    traZODone (DESYREL) 50 MG tablet Take 1 tablet by mouth Every Night. 90  tablet 1     No facility-administered medications prior to visit.       Opioid medication/s are on active medication list.  and I have evaluated her active treatment plan and pain score trends (see table).  Vitals:    09/26/23 0903   PainSc:   2     I have reviewed the chart for potential of high risk medication and harmful drug interactions in the elderly.          Aspirin is not on active medication list.  Aspirin use is not indicated based on review of current medical condition/s. Risk of harm outweighs potential benefits.  .    Fall Risk Assessment was completed, and patient is at low risk for falls.      Patient Active Problem List   Diagnosis    Elevated lipids    GERD without esophagitis    Hypertension    Hypothyroidism    Glucose intolerance (impaired glucose tolerance)    Polycythemia    Migraine with aura    Joint pain    Pre-op examination    Diverticulosis of large intestine without hemorrhage    Primary insomnia    Primary osteoarthritis    Dupuytren's contracture of hand     Advance Care Planning   Advance Directive is not on file.  ACP discussion was held with the patient during this visit. Patient does not have an advance directive, information provided.    Review of Systems   Constitutional:  Positive for fatigue. Negative for diaphoresis.   HENT:  Negative for congestion, ear pain, facial swelling, hearing loss, nosebleeds, postnasal drip, sinus pressure and sore throat.    Eyes:  Negative for pain, discharge and itching.   Respiratory:  Negative for cough, chest tightness, shortness of breath and wheezing.    Cardiovascular:  Positive for chest pain. Negative for palpitations and leg swelling.   Gastrointestinal:  Negative for abdominal distention, blood in stool, diarrhea, nausea and vomiting.        2/18 colonoscopy without polyps, next 2028   Endocrine: Negative for polydipsia and polyuria.   Genitourinary:  Negative for difficulty urinating, dysuria, frequency and hematuria.        11/22  "mammogram   Musculoskeletal:  Positive for arthralgias. Negative for back pain, gait problem, joint swelling, myalgias and neck pain.   Skin:  Negative for rash and wound.   Neurological:  Negative for dizziness, syncope, weakness and headaches.   Psychiatric/Behavioral:  Negative for dysphoric mood and sleep disturbance. The patient is not nervous/anxious.        Objective       Vitals:    09/26/23 0903 09/26/23 0915   BP: 130/64 140/72   BP Location: Left arm    Patient Position: Sitting    Pulse: 53    SpO2: 97%    Weight: 72.1 kg (159 lb)    Height: 165.1 cm (65\")    PainSc:   2      Body mass index is 26.46 kg/m².  BMI has not been calculated during today's encounter.     Does the patient have evidence of cognitive impairment? No    Physical Exam  Constitutional:       Appearance: Normal appearance. She is well-developed.   HENT:      Head: Normocephalic and atraumatic.      Right Ear: External ear normal.      Left Ear: External ear normal.      Nose: Nose normal.      Mouth/Throat:      Mouth: Mucous membranes are moist.      Pharynx: Oropharynx is clear.   Eyes:      Extraocular Movements: Extraocular movements intact.      Conjunctiva/sclera: Conjunctivae normal.      Pupils: Pupils are equal, round, and reactive to light.   Cardiovascular:      Rate and Rhythm: Normal rate and regular rhythm.      Heart sounds: Normal heart sounds.   Pulmonary:      Effort: Pulmonary effort is normal.      Breath sounds: Normal breath sounds.   Abdominal:      General: Bowel sounds are normal.      Palpations: Abdomen is soft.   Musculoskeletal:         General: Normal range of motion.      Cervical back: Normal range of motion and neck supple.   Lymphadenopathy:      Cervical: No cervical adenopathy.   Skin:     General: Skin is warm and dry.   Neurological:      General: No focal deficit present.      Mental Status: She is alert and oriented to person, place, and time.   Psychiatric:         Mood and Affect: Mood normal. "         Behavior: Behavior normal.         Thought Content: Thought content normal.     Lab Results   Component Value Date    TRIG 71 2023    HDL 68 (H) 2023    LDL 70 2023    VLDL 14 2023    HGBA1C 5.90 (H) 2023            HEALTH RISK ASSESSMENT    Smoking Status:  Social History     Tobacco Use   Smoking Status Former   Smokeless Tobacco Never     Alcohol Consumption:  Social History     Substance and Sexual Activity   Alcohol Use None     Fall Risk Screen:    STEADI Fall Risk Assessment was completed, and patient is at LOW risk for falls.Assessment completed on:2023    Depression Screenin/26/2023     9:00 AM   PHQ-2/PHQ-9 Depression Screening   Little Interest or Pleasure in Doing Things 0-->not at all   Feeling Down, Depressed or Hopeless 0-->not at all   PHQ-9: Brief Depression Severity Measure Score 0       Health Habits and Functional and Cognitive Screenin/26/2023     9:00 AM   Functional & Cognitive Status   Do you have difficulty preparing food and eating? No   Do you have difficulty bathing yourself, getting dressed or grooming yourself? No   Do you have difficulty using the toilet? No   Do you have difficulty moving around from place to place? No   Do you have trouble with steps or getting out of a bed or a chair? No   Current Diet Well Balanced Diet   Dental Exam Up to date   Eye Exam Up to date   Exercise (times per week) 2 times per week   Current Exercises Include Walking   Do you need help using the phone?  No   Are you deaf or do you have serious difficulty hearing?  No   Do you need help to go to places out of walking distance? No   Do you need help shopping? No   Do you need help preparing meals?  No   Do you need help with housework?  No   Do you need help with laundry? No   Do you need help taking your medications? No   Do you need help managing money? No   Do you ever drive or ride in a car without wearing a seat belt? No   Have you felt  unusual stress, anger or loneliness in the last month? No   Who do you live with? Spouse   If you need help, do you have trouble finding someone available to you? No   Have you been bothered in the last four weeks by sexual problems? No   Do you have difficulty concentrating, remembering or making decisions? No       Age-appropriate Screening Schedule:  Refer to the list below for future screening recommendations based on patient's age, sex and/or medical conditions. Orders for these recommended tests are listed in the plan section. The patient has been provided with a written plan.    Health Maintenance   Topic Date Due    BMI FOLLOWUP  Never done    TDAP/TD VACCINES (1 - Tdap) Never done    ZOSTER VACCINE (1 of 2) Never done    HEPATITIS C SCREENING  Never done    PAP SMEAR  Never done    DXA SCAN  12/15/2018    Pneumococcal Vaccine 65+ (1 - PCV) Never done    COVID-19 Vaccine (4 - Pfizer series) 12/23/2021    INFLUENZA VACCINE  10/01/2023    ANNUAL WELLNESS VISIT  09/26/2024    MAMMOGRAM  11/22/2024    COLORECTAL CANCER SCREENING  08/29/2028              Assessment & Plan     CMS Preventative Services Quick Reference  Risk Factors Identified During Encounter  Immunizations Discussed/Encouraged: Td, Influenza, Prevnar 20 (Pneumococcal 20-valent conjugate), Shingrix, and COVID19  Polypharmacy: Medication List reviewed and Medications are appropriate for patient  The above risks/problems have been discussed with the patient.  Follow up actions/plans if indicated are seen below in the Assessment/Plan Section.  Pertinent information has been shared with the patient in the After Visit Summary.    Diagnoses and all orders for this visit:    1. Primary hypertension (Primary)    2. Elevated lipids    3. Hypothyroidism, unspecified type    4. Glucose intolerance (impaired glucose tolerance)    5. Diverticulosis of large intestine without hemorrhage    6. Primary insomnia    7. Encounter for Medicare annual wellness  exam    8. Chest pain, unspecified type  -     Stress test with myocardial perfusion        Follow Up:   No follow-ups on file.     An After Visit Summary and PPPS were given to the patient.             hyperlipidemia-labs at goal on statin, counseled on diet  htn-advised low NA and exercise, goal of <130/80, counseled on wt loss efforts  hypothryoid-lab at goal  Glucose intolerance-advised low carb/ncs, AIC at goal  gerd-controlled on omeprazole  Diverticular dz-advised adequate fiber intake, asymptomatic  Insomnia-controlled on trazodone  DJD/Dupuytren contracture-Celebrex prn and ortho when ready  Vit def-recheck given hx of gastric bypass  Chest pain-schedule myoview      recent labs noted and dw patient

## 2023-10-07 DIAGNOSIS — I10 PRIMARY HYPERTENSION: Primary | ICD-10-CM

## 2023-10-07 RX ORDER — OLMESARTAN MEDOXOMIL AND HYDROCHLOROTHIAZIDE 40/25 40; 25 MG/1; MG/1
1 TABLET ORAL EVERY MORNING
Qty: 30 TABLET | Refills: 5 | Status: SHIPPED | OUTPATIENT
Start: 2023-10-07

## 2023-10-09 RX ORDER — TRAZODONE HYDROCHLORIDE 50 MG/1
50 TABLET ORAL NIGHTLY
Qty: 90 TABLET | Refills: 3 | Status: SHIPPED | OUTPATIENT
Start: 2023-10-09

## 2023-10-17 DIAGNOSIS — Z01.818 PRE-OP EXAM: Primary | ICD-10-CM

## 2023-10-30 ENCOUNTER — TRANSCRIBE ORDERS (OUTPATIENT)
Dept: ADMINISTRATIVE | Facility: HOSPITAL | Age: 68
End: 2023-10-30
Payer: MEDICARE

## 2023-10-30 DIAGNOSIS — Z12.31 VISIT FOR SCREENING MAMMOGRAM: Primary | ICD-10-CM

## 2023-11-01 ENCOUNTER — TRANSCRIBE ORDERS (OUTPATIENT)
Dept: ADMINISTRATIVE | Facility: HOSPITAL | Age: 68
End: 2023-11-01
Payer: MEDICARE

## 2023-11-01 DIAGNOSIS — Z13.820 ENCOUNTER FOR SCREENING FOR OSTEOPOROSIS: Primary | ICD-10-CM

## 2023-11-02 ENCOUNTER — HOSPITAL ENCOUNTER (OUTPATIENT)
Dept: CARDIOLOGY | Facility: HOSPITAL | Age: 68
Discharge: HOME | End: 2023-11-02
Payer: MEDICARE

## 2023-11-02 LAB
BH CV REST NUCLEAR ISOTOPE DOSE: 9.8 MCI
BH CV STRESS BP STAGE 1: NORMAL
BH CV STRESS BP STAGE 2: NORMAL
BH CV STRESS BP STAGE 3: NORMAL
BH CV STRESS DURATION MIN STAGE 1: 3
BH CV STRESS DURATION MIN STAGE 2: 3
BH CV STRESS DURATION MIN STAGE 3: 1
BH CV STRESS DURATION SEC STAGE 1: 0
BH CV STRESS DURATION SEC STAGE 2: 0
BH CV STRESS DURATION SEC STAGE 3: 46
BH CV STRESS GRADE STAGE 1: 10
BH CV STRESS GRADE STAGE 2: 12
BH CV STRESS GRADE STAGE 3: 14
BH CV STRESS HR STAGE 1: 105
BH CV STRESS HR STAGE 2: 129
BH CV STRESS HR STAGE 3: 146
BH CV STRESS METS STAGE 1: 5
BH CV STRESS METS STAGE 2: 7.5
BH CV STRESS METS STAGE 3: 10
BH CV STRESS NUCLEAR ISOTOPE DOSE: 33 MCI
BH CV STRESS O2 STAGE 1: 100
BH CV STRESS O2 STAGE 2: 99
BH CV STRESS O2 STAGE 3: 98
BH CV STRESS PROTOCOL 1: NORMAL
BH CV STRESS RECOVERY BP: NORMAL MMHG
BH CV STRESS RECOVERY HR: 86 BPM
BH CV STRESS RECOVERY O2: 99 %
BH CV STRESS SPEED STAGE 1: 1.7
BH CV STRESS SPEED STAGE 2: 2.5
BH CV STRESS SPEED STAGE 3: 3.4
BH CV STRESS STAGE 1: 1
BH CV STRESS STAGE 2: 2
BH CV STRESS STAGE 3: 3
LV EF NUC BP: 61 %
MAXIMAL PREDICTED HEART RATE: 152 BPM
PERCENT MAX PREDICTED HR: 98.68 %
STRESS BASELINE BP: NORMAL MMHG
STRESS BASELINE HR: 58 BPM
STRESS O2 SAT REST: 99 %
STRESS PERCENT HR: 116 %
STRESS POST EXERCISE DUR MIN: 7 MIN
STRESS POST EXERCISE DUR SEC: 46 SEC
STRESS POST O2 SAT PEAK: 99 %
STRESS POST PEAK BP: NORMAL MMHG
STRESS POST PEAK HR: 150 BPM
STRESS TARGET HR: 129 BPM

## 2023-11-02 PROCEDURE — 78452 HT MUSCLE IMAGE SPECT MULT: CPT

## 2023-11-02 PROCEDURE — 93017 CV STRESS TEST TRACING ONLY: CPT

## 2023-11-02 PROCEDURE — A9500 TC99M SESTAMIBI: HCPCS | Performed by: INTERNAL MEDICINE

## 2023-11-02 PROCEDURE — 0 TECHNETIUM SESTAMIBI: Performed by: INTERNAL MEDICINE

## 2023-11-02 RX ADMIN — TECHNETIUM TC 99M SESTAMIBI 1 DOSE: 1 INJECTION INTRAVENOUS at 09:50

## 2023-11-02 RX ADMIN — TECHNETIUM TC 99M SESTAMIBI 1 DOSE: 1 INJECTION INTRAVENOUS at 07:29

## 2023-11-03 ENCOUNTER — HOSPITAL ENCOUNTER (OUTPATIENT)
Dept: GENERAL RADIOLOGY | Facility: HOSPITAL | Age: 68
Discharge: HOME | End: 2023-11-03
Admitting: INTERNAL MEDICINE
Payer: MEDICARE

## 2023-11-03 DIAGNOSIS — Z01.818 PRE-OP EXAM: ICD-10-CM

## 2023-11-03 PROCEDURE — 71046 X-RAY EXAM CHEST 2 VIEWS: CPT

## 2023-11-07 ENCOUNTER — OFFICE VISIT (OUTPATIENT)
Dept: INTERNAL MEDICINE | Facility: CLINIC | Age: 68
End: 2023-11-07
Payer: MEDICARE

## 2023-11-07 VITALS
WEIGHT: 153 LBS | HEART RATE: 62 BPM | BODY MASS INDEX: 25.49 KG/M2 | SYSTOLIC BLOOD PRESSURE: 140 MMHG | DIASTOLIC BLOOD PRESSURE: 70 MMHG | HEIGHT: 65 IN | OXYGEN SATURATION: 98 %

## 2023-11-07 DIAGNOSIS — E03.9 HYPOTHYROIDISM, UNSPECIFIED TYPE: ICD-10-CM

## 2023-11-07 DIAGNOSIS — Z01.818 PRE-OP EXAMINATION: ICD-10-CM

## 2023-11-07 DIAGNOSIS — R73.02 GLUCOSE INTOLERANCE (IMPAIRED GLUCOSE TOLERANCE): ICD-10-CM

## 2023-11-07 DIAGNOSIS — I10 PRIMARY HYPERTENSION: ICD-10-CM

## 2023-11-07 DIAGNOSIS — I10 PRIMARY HYPERTENSION: Primary | ICD-10-CM

## 2023-11-07 DIAGNOSIS — K21.9 GERD WITHOUT ESOPHAGITIS: ICD-10-CM

## 2023-11-07 RX ORDER — OLMESARTAN MEDOXOMIL AND HYDROCHLOROTHIAZIDE 40/25 40; 25 MG/1; MG/1
1 TABLET ORAL EVERY MORNING
Qty: 90 TABLET | Refills: 2 | Status: SHIPPED | OUTPATIENT
Start: 2023-11-07

## 2023-11-07 NOTE — PROGRESS NOTES
Patient is a 67 y.o. female who is here for a physical.  Chief Complaint   Patient presents with    Pre-op Exam         HPI:    Here for pre op exam.  Has planned Gyn procedure.  No SOB or CP.  No cough.  No fever or chills.  No problems with prior anesthesia.  No constipation.  No dysuria.  Occasional bruising. No hx of DVT.    History:     Patient Active Problem List   Diagnosis    Elevated lipids    GERD without esophagitis    Hypertension    Hypothyroidism    Glucose intolerance (impaired glucose tolerance)    Polycythemia    Migraine with aura    Joint pain    Pre-op examination    Diverticulosis of large intestine without hemorrhage    Primary insomnia    Primary osteoarthritis    Dupuytren's contracture of hand    Pre-op examination       Past Medical History:   Diagnosis Date    Chemical exposure     phenteramine    Diverticulosis     Fibroid, uterine     Joint pain     Migraine with aura     Needle stick injury        Past Surgical History:   Procedure Laterality Date    ABDOMINAL HYSTERECTOMY Bilateral 2007    Removal Of Both Ovaries    BREAST BIOPSY Left     BREAST BIOPSY Right     BREAST CYST ASPIRATION Right     BREAST EXCISIONAL BIOPSY Left     COLONOSCOPY  02/14/2018    GASTRIC BYPASS      INCISIONAL BREAST BIOPSY Left 1998    OOPHORECTOMY      TOTAL KNEE ARTHROPLASTY Right     TUBAL ABDOMINAL LIGATION  1980       Current Outpatient Medications on File Prior to Visit   Medication Sig    acetaminophen (TYLENOL) 325 MG tablet Take 2 tablets by mouth Every 6 (Six) Hours As Needed for Mild Pain .    bisoprolol-hydrochlorothiazide (ZIAC) 5-6.25 MG per tablet Take 1 tablet by mouth Daily.    calcium carbonate (OS-HARPER) 600 MG tablet Take 1 tablet by mouth Every Night.    estradiol (CLIMARA) 0.1 MG/24HR patch 0.5 patches 1 (One) Time Per Week.    levothyroxine (SYNTHROID, LEVOTHROID) 175 MCG tablet Take 1 tablet by mouth Every Morning.    Multiple Vitamins-Minerals (MULTIVITAMIN ADULT PO) Take 1 tablet by  mouth Daily.    olmesartan-hydrochlorothiazide (Benicar HCT) 40-25 MG per tablet Take 1 tablet by mouth Every Morning.    omeprazole (priLOSEC) 40 MG capsule Take 1 capsule by mouth Daily.    pravastatin (PRAVACHOL) 40 MG tablet Take 1 tablet by mouth Every Night.    traMADol (ULTRAM) 50 MG tablet Take 1 tablet by mouth Every 6 (Six) Hours As Needed for Moderate Pain.    traZODone (DESYREL) 50 MG tablet TAKE ONE TABLET BY MOUTH ONCE NIGHTLY     No current facility-administered medications on file prior to visit.       Family History   Problem Relation Age of Onset    Heart disease Other     Diabetes Other     Stroke Other     Breast cancer Paternal Aunt 50    Diabetes Maternal Grandmother     Stroke Maternal Grandfather     Heart disease Paternal Grandfather     Ovarian cancer Neg Hx        Social History     Socioeconomic History    Marital status:    Tobacco Use    Smoking status: Former    Smokeless tobacco: Never   Substance and Sexual Activity    Drug use: No    Sexual activity: Defer         Review of Systems   Constitutional:  Negative for diaphoresis.   HENT:  Negative for congestion, ear pain, facial swelling, hearing loss, nosebleeds, postnasal drip, sinus pressure and sore throat.    Eyes:  Negative for pain, discharge and itching.   Respiratory:  Negative for cough, chest tightness, shortness of breath and wheezing.    Cardiovascular:  Negative for palpitations and leg swelling.   Gastrointestinal:  Negative for abdominal distention, blood in stool, diarrhea, nausea and vomiting.        2/18 colonoscopy without polyps, next 2028   Endocrine: Negative for polydipsia and polyuria.   Genitourinary:  Negative for difficulty urinating, dysuria, frequency and hematuria.        11/22 mammogram   Musculoskeletal:  Positive for arthralgias. Negative for back pain, gait problem, joint swelling, myalgias and neck pain.   Skin:  Negative for rash and wound.   Neurological:  Negative for dizziness, syncope,  "weakness and headaches.   Psychiatric/Behavioral:  Negative for dysphoric mood and sleep disturbance. The patient is not nervous/anxious.        /70   Pulse 62   Ht 165.1 cm (65\")   Wt 69.4 kg (153 lb)   LMP  (LMP Unknown)   SpO2 98%   BMI 25.46 kg/m²       Physical Exam  Constitutional:       Appearance: Normal appearance. She is well-developed.   HENT:      Head: Normocephalic and atraumatic.      Right Ear: External ear normal.      Left Ear: External ear normal.      Nose: Nose normal.      Mouth/Throat:      Mouth: Mucous membranes are moist.      Pharynx: Oropharynx is clear.   Eyes:      Extraocular Movements: Extraocular movements intact.      Conjunctiva/sclera: Conjunctivae normal.      Pupils: Pupils are equal, round, and reactive to light.   Cardiovascular:      Rate and Rhythm: Normal rate and regular rhythm.      Heart sounds: Normal heart sounds.   Pulmonary:      Effort: Pulmonary effort is normal.      Breath sounds: Normal breath sounds.   Abdominal:      General: Bowel sounds are normal.      Palpations: Abdomen is soft.   Musculoskeletal:         General: Normal range of motion.      Cervical back: Normal range of motion and neck supple.   Lymphadenopathy:      Cervical: No cervical adenopathy.   Skin:     General: Skin is warm and dry.   Neurological:      General: No focal deficit present.      Mental Status: She is alert and oriented to person, place, and time.   Psychiatric:         Mood and Affect: Mood normal.         Behavior: Behavior normal.         Thought Content: Thought content normal.         Procedure:      Discussion/Summary:    hyperlipidemia-labs at goal on statin, counseled on diet  htn-advised low NA and exercise, goal of <130/80, counseled on wt loss efforts  hypothryoid-lab at goal  Glucose intolerance-advised low carb/ncs, AIC at goal  gerd-controlled on omeprazole  Diverticular dz-advised adequate fiber intake, asymptomatic  Insomnia-controlled on " trazodone  DJD/Dupuytren contracture-Celebrex prn and ortho when ready  Vit def-recheck given hx of gastric bypass  Chest pain-myoview neg      recent labs noted and dw patient, CXR noted    Cleared for Robotic sacrocolpopexy and mid urethral sling    Current Outpatient Medications:     acetaminophen (TYLENOL) 325 MG tablet, Take 2 tablets by mouth Every 6 (Six) Hours As Needed for Mild Pain ., Disp: , Rfl:     bisoprolol-hydrochlorothiazide (ZIAC) 5-6.25 MG per tablet, Take 1 tablet by mouth Daily., Disp: 90 tablet, Rfl: 3    calcium carbonate (OS-HARPER) 600 MG tablet, Take 1 tablet by mouth Every Night., Disp: , Rfl:     estradiol (CLIMARA) 0.1 MG/24HR patch, 0.5 patches 1 (One) Time Per Week., Disp: , Rfl:     levothyroxine (SYNTHROID, LEVOTHROID) 175 MCG tablet, Take 1 tablet by mouth Every Morning., Disp: 90 tablet, Rfl: 3    Multiple Vitamins-Minerals (MULTIVITAMIN ADULT PO), Take 1 tablet by mouth Daily., Disp: , Rfl:     olmesartan-hydrochlorothiazide (Benicar HCT) 40-25 MG per tablet, Take 1 tablet by mouth Every Morning., Disp: 30 tablet, Rfl: 5    omeprazole (priLOSEC) 40 MG capsule, Take 1 capsule by mouth Daily., Disp: 90 capsule, Rfl: 3    pravastatin (PRAVACHOL) 40 MG tablet, Take 1 tablet by mouth Every Night., Disp: 90 tablet, Rfl: 3    traMADol (ULTRAM) 50 MG tablet, Take 1 tablet by mouth Every 6 (Six) Hours As Needed for Moderate Pain., Disp: 120 tablet, Rfl: 2    traZODone (DESYREL) 50 MG tablet, TAKE ONE TABLET BY MOUTH ONCE NIGHTLY, Disp: 90 tablet, Rfl: 3        Diagnoses and all orders for this visit:    1. Primary hypertension (Primary)    2. Hypothyroidism, unspecified type    3. Glucose intolerance (impaired glucose tolerance)    4. GERD without esophagitis    5. Pre-op examination

## 2023-11-25 DIAGNOSIS — M15.9 PRIMARY OSTEOARTHRITIS INVOLVING MULTIPLE JOINTS: ICD-10-CM

## 2023-11-25 DIAGNOSIS — K21.9 GERD WITHOUT ESOPHAGITIS: ICD-10-CM

## 2023-11-27 ENCOUNTER — TRANSCRIBE ORDERS (OUTPATIENT)
Dept: ADMINISTRATIVE | Facility: HOSPITAL | Age: 68
End: 2023-11-27
Payer: MEDICARE

## 2023-11-27 DIAGNOSIS — N95.1 MENOPAUSAL SYMPTOM: Primary | ICD-10-CM

## 2023-11-27 RX ORDER — TRAMADOL HYDROCHLORIDE 50 MG/1
50 TABLET ORAL EVERY 6 HOURS PRN
Qty: 120 TABLET | Refills: 2 | Status: SHIPPED | OUTPATIENT
Start: 2023-11-27

## 2023-11-27 RX ORDER — OMEPRAZOLE 40 MG/1
40 CAPSULE, DELAYED RELEASE ORAL DAILY
Qty: 90 CAPSULE | Refills: 3 | Status: SHIPPED | OUTPATIENT
Start: 2023-11-27

## 2023-11-28 ENCOUNTER — HOSPITAL ENCOUNTER (OUTPATIENT)
Dept: BONE DENSITY | Facility: HOSPITAL | Age: 68
Discharge: HOME | End: 2023-11-28
Admitting: NURSE PRACTITIONER
Payer: MEDICARE

## 2023-11-28 DIAGNOSIS — N95.1 MENOPAUSAL SYMPTOM: ICD-10-CM

## 2023-11-28 PROCEDURE — 77080 DXA BONE DENSITY AXIAL: CPT

## 2023-12-20 ENCOUNTER — HOSPITAL ENCOUNTER (OUTPATIENT)
Dept: MAMMOGRAPHY | Facility: HOSPITAL | Age: 68
Discharge: HOME | End: 2023-12-20
Admitting: NURSE PRACTITIONER
Payer: MEDICARE

## 2023-12-20 DIAGNOSIS — Z12.31 VISIT FOR SCREENING MAMMOGRAM: ICD-10-CM

## 2023-12-20 PROCEDURE — 77067 SCR MAMMO BI INCL CAD: CPT

## 2023-12-20 PROCEDURE — 77063 BREAST TOMOSYNTHESIS BI: CPT

## 2024-01-09 RX ORDER — PRAVASTATIN SODIUM 40 MG
40 TABLET ORAL NIGHTLY
Qty: 90 TABLET | Refills: 3 | Status: SHIPPED | OUTPATIENT
Start: 2024-01-09

## 2024-03-24 DIAGNOSIS — E03.8 OTHER SPECIFIED HYPOTHYROIDISM: ICD-10-CM

## 2024-03-25 RX ORDER — LEVOTHYROXINE SODIUM 175 UG/1
175 TABLET ORAL EVERY MORNING
Qty: 90 TABLET | Refills: 3 | Status: SHIPPED | OUTPATIENT
Start: 2024-03-25

## 2024-03-26 ENCOUNTER — OFFICE VISIT (OUTPATIENT)
Dept: INTERNAL MEDICINE | Facility: CLINIC | Age: 69
End: 2024-03-26
Payer: MEDICARE

## 2024-03-26 ENCOUNTER — LAB (OUTPATIENT)
Dept: INTERNAL MEDICINE | Facility: CLINIC | Age: 69
End: 2024-03-26
Payer: MEDICARE

## 2024-03-26 VITALS
HEIGHT: 65 IN | OXYGEN SATURATION: 97 % | BODY MASS INDEX: 25.16 KG/M2 | DIASTOLIC BLOOD PRESSURE: 74 MMHG | WEIGHT: 151 LBS | HEART RATE: 61 BPM | SYSTOLIC BLOOD PRESSURE: 144 MMHG

## 2024-03-26 DIAGNOSIS — E03.9 ACQUIRED HYPOTHYROIDISM: ICD-10-CM

## 2024-03-26 DIAGNOSIS — E55.9 VITAMIN D DEFICIENCY: ICD-10-CM

## 2024-03-26 DIAGNOSIS — E03.9 ACQUIRED HYPOTHYROIDISM: Primary | ICD-10-CM

## 2024-03-26 DIAGNOSIS — K21.9 GERD WITHOUT ESOPHAGITIS: ICD-10-CM

## 2024-03-26 DIAGNOSIS — K57.30 DIVERTICULOSIS OF LARGE INTESTINE WITHOUT HEMORRHAGE: ICD-10-CM

## 2024-03-26 DIAGNOSIS — E78.5 ELEVATED LIPIDS: ICD-10-CM

## 2024-03-26 DIAGNOSIS — I10 PRIMARY HYPERTENSION: Primary | ICD-10-CM

## 2024-03-26 DIAGNOSIS — R73.02 GLUCOSE INTOLERANCE (IMPAIRED GLUCOSE TOLERANCE): ICD-10-CM

## 2024-03-26 LAB
CHOLEST SERPL-MCNC: 160 MG/DL (ref 0–200)
DEPRECATED RDW RBC AUTO: 41 FL (ref 37–54)
ERYTHROCYTE [DISTWIDTH] IN BLOOD BY AUTOMATED COUNT: 13.4 % (ref 12.3–15.4)
HCT VFR BLD AUTO: 38.8 % (ref 34–46.6)
HDLC SERPL-MCNC: 85 MG/DL (ref 40–60)
HGB BLD-MCNC: 13.1 G/DL (ref 12–15.9)
LDLC SERPL CALC-MCNC: 63 MG/DL (ref 0–100)
LDLC/HDLC SERPL: 0.74 {RATIO}
MCH RBC QN AUTO: 28.6 PG (ref 26.6–33)
MCHC RBC AUTO-ENTMCNC: 33.8 G/DL (ref 31.5–35.7)
MCV RBC AUTO: 84.7 FL (ref 79–97)
PLATELET # BLD AUTO: 240 10*3/MM3 (ref 140–450)
PMV BLD AUTO: 10.4 FL (ref 6–12)
RBC # BLD AUTO: 4.58 10*6/MM3 (ref 3.77–5.28)
TRIGL SERPL-MCNC: 61 MG/DL (ref 0–150)
TSH SERPL DL<=0.05 MIU/L-ACNC: 0.44 UIU/ML (ref 0.27–4.2)
VLDLC SERPL-MCNC: 12 MG/DL (ref 5–40)
WBC NRBC COR # BLD AUTO: 5.98 10*3/MM3 (ref 3.4–10.8)

## 2024-03-26 PROCEDURE — 1159F MED LIST DOCD IN RCRD: CPT | Performed by: INTERNAL MEDICINE

## 2024-03-26 PROCEDURE — 85027 COMPLETE CBC AUTOMATED: CPT | Performed by: INTERNAL MEDICINE

## 2024-03-26 PROCEDURE — 99214 OFFICE O/P EST MOD 30 MIN: CPT | Performed by: INTERNAL MEDICINE

## 2024-03-26 PROCEDURE — 1160F RVW MEDS BY RX/DR IN RCRD: CPT | Performed by: INTERNAL MEDICINE

## 2024-03-26 PROCEDURE — 80053 COMPREHEN METABOLIC PANEL: CPT | Performed by: INTERNAL MEDICINE

## 2024-03-26 PROCEDURE — 82306 VITAMIN D 25 HYDROXY: CPT | Performed by: INTERNAL MEDICINE

## 2024-03-26 PROCEDURE — 80061 LIPID PANEL: CPT | Performed by: INTERNAL MEDICINE

## 2024-03-26 PROCEDURE — 3077F SYST BP >= 140 MM HG: CPT | Performed by: INTERNAL MEDICINE

## 2024-03-26 PROCEDURE — 3078F DIAST BP <80 MM HG: CPT | Performed by: INTERNAL MEDICINE

## 2024-03-26 PROCEDURE — 82607 VITAMIN B-12: CPT | Performed by: INTERNAL MEDICINE

## 2024-03-26 PROCEDURE — 84443 ASSAY THYROID STIM HORMONE: CPT | Performed by: INTERNAL MEDICINE

## 2024-03-26 PROCEDURE — 36415 COLL VENOUS BLD VENIPUNCTURE: CPT | Performed by: INTERNAL MEDICINE

## 2024-03-26 PROCEDURE — 83036 HEMOGLOBIN GLYCOSYLATED A1C: CPT | Performed by: INTERNAL MEDICINE

## 2024-03-26 NOTE — PROGRESS NOTES
Patient is a 68 y.o. female who is here for a follow up of hyperlipidemia and hypertension.  Chief Complaint   Patient presents with    Hyperlipidemia    Hypertension         HPI:    Here for mgmt of HTN and hyperlipidemia.  Has recovered from surgery.  Trying to walk cynthia.  Some issues with balance.  No dizziness or lightheadedness.  No HAs.  No abdominal pains.     History:     Patient Active Problem List   Diagnosis    Elevated lipids    GERD without esophagitis    Hypertension    Hypothyroidism    Glucose intolerance (impaired glucose tolerance)    Migraine with aura    Joint pain    Pre-op examination    Diverticulosis of large intestine without hemorrhage    Primary insomnia    Primary osteoarthritis    Dupuytren's contracture of hand    Pre-op examination       Past Medical History:   Diagnosis Date    Chemical exposure     phenteramine    Diverticulosis     Fibroid, uterine     Joint pain     Migraine with aura     Needle stick injury        Past Surgical History:   Procedure Laterality Date    ABDOMINAL HYSTERECTOMY Bilateral 2007    Removal Of Both Ovaries    BREAST BIOPSY Left     BREAST BIOPSY Right     BREAST CYST ASPIRATION Right     BREAST EXCISIONAL BIOPSY Left     COLONOSCOPY  02/14/2018    DIAGNOSTIC LAPAROSCOPY  02/2024    AMOS  and sling by Dr Diallo    GASTRIC BYPASS      INCISIONAL BREAST BIOPSY Left 1998    OOPHORECTOMY      TOTAL KNEE ARTHROPLASTY Right     TUBAL ABDOMINAL LIGATION  1980       Current Outpatient Medications on File Prior to Visit   Medication Sig    acetaminophen (TYLENOL) 325 MG tablet Take 2 tablets by mouth Every 6 (Six) Hours As Needed for Mild Pain .    bisoprolol-hydrochlorothiazide (ZIAC) 5-6.25 MG per tablet Take 1 tablet by mouth Daily.    calcium carbonate (OS-HARPER) 600 MG tablet Take 1 tablet by mouth Every Night.    estradiol (CLIMARA) 0.1 MG/24HR patch 0.5 patches 1 (One) Time Per Week.    levothyroxine (SYNTHROID, LEVOTHROID) 175 MCG tablet TAKE ONE TABLET BY MOUTH  EVERY MORNING    Multiple Vitamins-Minerals (MULTIVITAMIN ADULT PO) Take 1 tablet by mouth Daily.    olmesartan-hydrochlorothiazide (Benicar HCT) 40-25 MG per tablet Take 1 tablet by mouth Every Morning.    omeprazole (priLOSEC) 40 MG capsule Take 1 capsule by mouth Daily.    pravastatin (PRAVACHOL) 40 MG tablet TAKE ONE TABLET BY MOUTH ONCE NIGHTLY    traMADol (ULTRAM) 50 MG tablet Take 1 tablet by mouth Every 6 (Six) Hours As Needed for Moderate Pain.    traZODone (DESYREL) 50 MG tablet TAKE ONE TABLET BY MOUTH ONCE NIGHTLY     No current facility-administered medications on file prior to visit.       Family History   Problem Relation Age of Onset    Heart disease Other     Diabetes Other     Stroke Other     Breast cancer Paternal Aunt 50    Diabetes Maternal Grandmother     Stroke Maternal Grandfather     Heart disease Paternal Grandfather     Ovarian cancer Neg Hx        Social History     Socioeconomic History    Marital status:    Tobacco Use    Smoking status: Former    Smokeless tobacco: Never   Vaping Use    Vaping status: Never Used   Substance and Sexual Activity    Drug use: No    Sexual activity: Defer         Review of Systems   Constitutional:  Negative for diaphoresis.   HENT:  Negative for congestion, ear pain, facial swelling, hearing loss, nosebleeds, postnasal drip, sinus pressure and sore throat.    Eyes:  Negative for pain, discharge and itching.   Respiratory:  Negative for cough, chest tightness, shortness of breath and wheezing.    Cardiovascular:  Negative for palpitations and leg swelling.   Gastrointestinal:  Negative for abdominal distention, blood in stool, diarrhea, nausea and vomiting.        2/18 colonoscopy without polyps, next 2028   Endocrine: Negative for polydipsia and polyuria.   Genitourinary:  Negative for difficulty urinating, dysuria, frequency and hematuria.        11/23 mammogram   Musculoskeletal:  Positive for arthralgias. Negative for back pain, gait problem,  "joint swelling, myalgias and neck pain.   Skin:  Negative for rash and wound.   Neurological:  Negative for dizziness, syncope, weakness and headaches.   Psychiatric/Behavioral:  Negative for dysphoric mood and sleep disturbance. The patient is not nervous/anxious.        /74   Pulse 61   Ht 165.1 cm (65\")   Wt 68.5 kg (151 lb)   LMP  (LMP Unknown)   SpO2 97%   BMI 25.13 kg/m²       Physical Exam  Constitutional:       Appearance: Normal appearance. She is well-developed.   HENT:      Head: Normocephalic and atraumatic.      Right Ear: External ear normal.      Left Ear: External ear normal.      Nose: Nose normal.      Mouth/Throat:      Mouth: Mucous membranes are moist.      Pharynx: Oropharynx is clear.   Eyes:      Extraocular Movements: Extraocular movements intact.      Conjunctiva/sclera: Conjunctivae normal.      Pupils: Pupils are equal, round, and reactive to light.   Cardiovascular:      Rate and Rhythm: Normal rate and regular rhythm.      Heart sounds: Normal heart sounds.   Pulmonary:      Effort: Pulmonary effort is normal.      Breath sounds: Normal breath sounds.   Abdominal:      General: Bowel sounds are normal.      Palpations: Abdomen is soft.   Musculoskeletal:         General: Normal range of motion.      Cervical back: Normal range of motion and neck supple.   Lymphadenopathy:      Cervical: No cervical adenopathy.   Skin:     General: Skin is warm and dry.   Neurological:      General: No focal deficit present.      Mental Status: She is alert and oriented to person, place, and time.   Psychiatric:         Mood and Affect: Mood normal.         Behavior: Behavior normal.         Thought Content: Thought content normal.         Procedure:      Discussion/Summary:    hyperlipidemia-labs today on statin at goal, counseled on diet  htn-advised low NA and exercise, goal of <130/80, counseled on wt loss efforts  hypothryoid-lab today  Glucose intolerance-advised low carb/ncs, AIC today " on target  gerd-controlled on omeprazole  Diverticular dz-advised adequate fiber intake, asymptomatic  Insomnia-controlled on trazodone  DJD/Dupuytren contracture-Celebrex prn and ortho when ready  Vit def-recheck given hx of gastric bypass  Chest pain-myoview neg      3/26 labs noted and dw patient    Current Outpatient Medications:     acetaminophen (TYLENOL) 325 MG tablet, Take 2 tablets by mouth Every 6 (Six) Hours As Needed for Mild Pain ., Disp: , Rfl:     bisoprolol-hydrochlorothiazide (ZIAC) 5-6.25 MG per tablet, Take 1 tablet by mouth Daily., Disp: 90 tablet, Rfl: 3    calcium carbonate (OS-HARPER) 600 MG tablet, Take 1 tablet by mouth Every Night., Disp: , Rfl:     estradiol (CLIMARA) 0.1 MG/24HR patch, 0.5 patches 1 (One) Time Per Week., Disp: , Rfl:     levothyroxine (SYNTHROID, LEVOTHROID) 175 MCG tablet, TAKE ONE TABLET BY MOUTH EVERY MORNING, Disp: 90 tablet, Rfl: 3    Multiple Vitamins-Minerals (MULTIVITAMIN ADULT PO), Take 1 tablet by mouth Daily., Disp: , Rfl:     olmesartan-hydrochlorothiazide (Benicar HCT) 40-25 MG per tablet, Take 1 tablet by mouth Every Morning., Disp: 90 tablet, Rfl: 2    omeprazole (priLOSEC) 40 MG capsule, Take 1 capsule by mouth Daily., Disp: 90 capsule, Rfl: 3    pravastatin (PRAVACHOL) 40 MG tablet, TAKE ONE TABLET BY MOUTH ONCE NIGHTLY, Disp: 90 tablet, Rfl: 3    traMADol (ULTRAM) 50 MG tablet, Take 1 tablet by mouth Every 6 (Six) Hours As Needed for Moderate Pain., Disp: 120 tablet, Rfl: 2    traZODone (DESYREL) 50 MG tablet, TAKE ONE TABLET BY MOUTH ONCE NIGHTLY, Disp: 90 tablet, Rfl: 3        Diagnoses and all orders for this visit:    1. Primary hypertension (Primary)  -     CBC (No Diff)    2. Elevated lipids  -     Comprehensive Metabolic Panel  -     Lipid Panel    3. Acquired hypothyroidism  -     TSH    4. Glucose intolerance (impaired glucose tolerance)  -     Hemoglobin A1c    5. Diverticulosis of large intestine without hemorrhage  -     Ambulatory Referral to  Gastroenterology    6. GERD without esophagitis  -     Vitamin B12    7. Vitamin D deficiency  -     Vitamin D,25-Hydroxy

## 2024-03-27 LAB
25(OH)D3 SERPL-MCNC: 66.1 NG/ML (ref 30–100)
ALBUMIN SERPL-MCNC: 4.3 G/DL (ref 3.5–5.2)
ALBUMIN/GLOB SERPL: 1.9 G/DL
ALP SERPL-CCNC: 72 U/L (ref 39–117)
ALT SERPL W P-5'-P-CCNC: 31 U/L (ref 1–33)
ANION GAP SERPL CALCULATED.3IONS-SCNC: 11.4 MMOL/L (ref 5–15)
AST SERPL-CCNC: 32 U/L (ref 1–32)
BILIRUB SERPL-MCNC: 0.4 MG/DL (ref 0–1.2)
BUN SERPL-MCNC: 15 MG/DL (ref 8–23)
BUN/CREAT SERPL: 19.7 (ref 7–25)
CALCIUM SPEC-SCNC: 9.2 MG/DL (ref 8.6–10.5)
CHLORIDE SERPL-SCNC: 97 MMOL/L (ref 98–107)
CO2 SERPL-SCNC: 27.6 MMOL/L (ref 22–29)
CREAT SERPL-MCNC: 0.76 MG/DL (ref 0.57–1)
EGFRCR SERPLBLD CKD-EPI 2021: 84.9 ML/MIN/1.73
GLOBULIN UR ELPH-MCNC: 2.3 GM/DL
GLUCOSE SERPL-MCNC: 103 MG/DL (ref 65–99)
HBA1C MFR BLD: 5.5 % (ref 4.8–5.6)
POTASSIUM SERPL-SCNC: 4.1 MMOL/L (ref 3.5–5.2)
PROT SERPL-MCNC: 6.6 G/DL (ref 6–8.5)
SODIUM SERPL-SCNC: 136 MMOL/L (ref 136–145)
VIT B12 BLD-MCNC: 516 PG/ML (ref 211–946)

## 2024-04-24 DIAGNOSIS — M15.9 PRIMARY OSTEOARTHRITIS INVOLVING MULTIPLE JOINTS: ICD-10-CM

## 2024-04-25 RX ORDER — TRAMADOL HYDROCHLORIDE 50 MG/1
50 TABLET ORAL EVERY 6 HOURS PRN
Qty: 120 TABLET | Refills: 2 | Status: SHIPPED | OUTPATIENT
Start: 2024-04-25

## 2024-06-03 RX ORDER — METHYLPREDNISOLONE 4 MG/1
TABLET ORAL
Qty: 21 TABLET | Refills: 0 | Status: SHIPPED | OUTPATIENT
Start: 2024-06-03 | End: 2024-06-08

## 2024-08-22 ENCOUNTER — TELEPHONE (OUTPATIENT)
Dept: INTERNAL MEDICINE | Facility: CLINIC | Age: 69
End: 2024-08-22
Payer: MEDICARE

## 2024-08-22 NOTE — TELEPHONE ENCOUNTER
I called pt to reschedule appointment on 10/01/2024 and pt stated that her and her  come together and need morning appointments. Pt stated that any day besides Thursday would work. Please call pt back

## 2024-08-30 ENCOUNTER — OFFICE VISIT (OUTPATIENT)
Dept: GASTROENTEROLOGY | Facility: CLINIC | Age: 69
End: 2024-08-30
Payer: MEDICARE

## 2024-08-30 VITALS
OXYGEN SATURATION: 98 % | BODY MASS INDEX: 25.09 KG/M2 | HEART RATE: 56 BPM | DIASTOLIC BLOOD PRESSURE: 70 MMHG | SYSTOLIC BLOOD PRESSURE: 170 MMHG | HEIGHT: 65 IN | WEIGHT: 150.6 LBS

## 2024-08-30 DIAGNOSIS — Z87.19 HISTORY OF DIVERTICULITIS: ICD-10-CM

## 2024-08-30 DIAGNOSIS — Z98.890 HISTORY OF COLONOSCOPY: ICD-10-CM

## 2024-08-30 DIAGNOSIS — K57.90 DIVERTICULOSIS: Primary | ICD-10-CM

## 2024-08-30 PROBLEM — I10 BENIGN ESSENTIAL HYPERTENSION: Status: ACTIVE | Noted: 2018-08-09

## 2024-08-30 PROBLEM — Z01.411 ENCOUNTER FOR GYNECOLOGICAL EXAMINATION (GENERAL) (ROUTINE) WITH ABNORMAL FINDINGS: Status: ACTIVE | Noted: 2020-02-06

## 2024-08-30 PROBLEM — Z01.419 NORMAL GYNECOLOGIC EXAMINATION: Status: ACTIVE | Noted: 2021-07-13

## 2024-08-30 PROBLEM — Z79.890 HORMONE REPLACEMENT THERAPY: Status: ACTIVE | Noted: 2021-07-13

## 2024-08-30 PROBLEM — K57.20 DIVERTICULITIS OF LARGE INTESTINE WITH PERFORATION AND ABSCESS WITHOUT BLEEDING: Status: ACTIVE | Noted: 2018-08-08

## 2024-08-30 PROBLEM — K21.9 GASTROESOPHAGEAL REFLUX DISEASE: Status: ACTIVE | Noted: 2020-02-06

## 2024-08-30 RX ORDER — POLYETHYLENE GLYCOL 3350 17 G/17G
17 POWDER, FOR SOLUTION ORAL DAILY
COMMUNITY

## 2024-08-30 NOTE — PROGRESS NOTES
GASTROENTEROLOGY OFFICE NOTE    Demetria Paris  [id number]  1955    CARE TEAM  Patient Care Team:  Abraham Sousa MD as PCP - General    Referring Provider: Abraham Sousa MD    Chief Complaint   Patient presents with    Diverticulosis     New patient referred by Dr. Sousa.         HISTORY OF PRESENT ILLNESS:   Demetria Paris is a 68 y.o. female who presents to the clinic today as a referral from Dr. Sousa regarding diveritculosis.     2/14/2018 colonoscopy due to personal history of polyps per Dr. Haines revealed diverticulosis and internal hemorrhoids.  Recommendations to start high-fiber diet and repeat colonoscopy in 10 years.    8/29/2018 colonoscopy per Dr. Mart due to 2.5 cm intramural abscess in rectosigmoid associated with recent hospitalization, history of complicated diverticulitis revealed colitis and proctitis with broad differential including residual from diverticulitis, antibiotic effect and inflammatory bowel disease, no evidence of neoplasia, fibrotic change to rectosigmoid from 10 to 25 cm with stricturing, few sigmoid diverticuli, pristine appearing distal ileum, Prometheus test for IBD ordered.  Office to follow-up to discuss history and findings and today's pathology once testing has resulted.  Left colon biopsies without abnormality.  Rectal biopsies revealed reactive colonic mucosa with review of pathology comment that reveals likely due to prior mucosal injury, no evidence of active colitis.    8/31/2018 inflammatory bowel disease SGI diagnostic panel and consistent with inflammatory bowel disease.    9/24/2018, documentation per Dr. Mart of negative Prometheus testing, it was documented patient declined recommended surgery, follow-up with infectious disease in place, return if difficulties arise.  She was previously treated with IV Invanz, patient reported history of left upper quadrant pain, symptoms aggravated by constipation.  It was documented first episode of  diverticulitis was 2 months prior with prior evaluation with CT and colonoscopy.  She was asymptomatic at the visit.    She reports her  would like to know why surgery was previously recommended.    She denies abdominal pain.    She reports a daily bowel movement.  She is taking MiraLAX with Citrucel daily.   She reports due to gastric bypass high-fiber diet is difficult.    Past Medical History:   Diagnosis Date    Chemical exposure     phenteramine    Diverticulosis     Encounter for gynecological examination (general) (routine) with abnormal findings 2/6/2020    Fibroid, uterine     Joint pain     Migraine with aura     Needle stick injury         Past Surgical History:   Procedure Laterality Date    ABDOMINAL HYSTERECTOMY Bilateral 2007    Removal Of Both Ovaries    BREAST BIOPSY Left     BREAST BIOPSY Right     BREAST CYST ASPIRATION Right     BREAST EXCISIONAL BIOPSY Left     COLONOSCOPY  02/14/2018    DIAGNOSTIC LAPAROSCOPY  02/2024    AMOS  and sling by Dr Diallo    ENDOSCOPY      GASTRIC BYPASS      INCISIONAL BREAST BIOPSY Left 1998    OOPHORECTOMY      TOTAL KNEE ARTHROPLASTY Right     TUBAL ABDOMINAL LIGATION  1980        Current Outpatient Medications on File Prior to Visit   Medication Sig    acetaminophen (TYLENOL) 325 MG tablet Take 2 tablets by mouth Every 6 (Six) Hours As Needed for Mild Pain .    bisoprolol-hydrochlorothiazide (ZIAC) 5-6.25 MG per tablet Take 1 tablet by mouth Daily.    calcium carbonate (OS-HARPER) 600 MG tablet Take 1 tablet by mouth Every Night.    estradiol (CLIMARA) 0.1 MG/24HR patch 0.5 patches 1 (One) Time Per Week.    levothyroxine (SYNTHROID, LEVOTHROID) 175 MCG tablet TAKE ONE TABLET BY MOUTH EVERY MORNING    Multiple Vitamins-Minerals (MULTIVITAMIN ADULT PO) Take 1 tablet by mouth Daily.    olmesartan-hydrochlorothiazide (Benicar HCT) 40-25 MG per tablet Take 1 tablet by mouth Every Morning.    omeprazole (priLOSEC) 40 MG capsule Take 1 capsule by mouth Daily.     "polyethylene glycol (MiraLax) 17 g packet Take 17 g by mouth Daily.    pravastatin (PRAVACHOL) 40 MG tablet TAKE ONE TABLET BY MOUTH ONCE NIGHTLY    traMADol (ULTRAM) 50 MG tablet Take 1 tablet by mouth Every 6 (Six) Hours As Needed for Moderate Pain.    traZODone (DESYREL) 50 MG tablet TAKE ONE TABLET BY MOUTH ONCE NIGHTLY    methylcellulose oral powder      No current facility-administered medications on file prior to visit.       Allergies   Allergen Reactions    Nickel Itching     States occurs with certain jewelry items       Family History   Problem Relation Age of Onset    Breast cancer Paternal Aunt 50    Diabetes Maternal Grandmother     Stroke Maternal Grandfather     Heart disease Paternal Grandfather     Heart disease Other     Diabetes Other     Stroke Other     Ovarian cancer Neg Hx     Colon cancer Neg Hx        Social History     Socioeconomic History    Marital status:    Tobacco Use    Smoking status: Former    Smokeless tobacco: Never   Vaping Use    Vaping status: Never Used   Substance and Sexual Activity    Alcohol use: Never    Drug use: No    Sexual activity: Defer       PHYSICAL EXAM   /70 (BP Location: Left arm, Patient Position: Sitting, Cuff Size: Adult)   Pulse 56   Ht 163.8 cm (64.5\")   Wt 68.3 kg (150 lb 9.6 oz)   LMP  (LMP Unknown)   SpO2 98%   BMI 25.45 kg/m²   Physical Exam  Constitutional:       General: She is not in acute distress.     Appearance: She is not toxic-appearing.   HENT:      Head: Normocephalic and atraumatic. No contusion.      Right Ear: External ear normal.      Left Ear: External ear normal.   Eyes:      General: Lids are normal. No scleral icterus.        Right eye: No discharge.         Left eye: No discharge.      Extraocular Movements: Extraocular movements intact.   Neck:      Trachea: Trachea normal.      Comments: No visible mass  No visible adenopathy  Cardiovascular:      Rate and Rhythm: Bradycardia present.   Pulmonary:      Effort: " No respiratory distress.      Comments: Symmetrical expansion    Abdominal:      Palpations: Abdomen is soft. There is no mass.      Comments: When palpating abdomen she reported she had the PE.   Musculoskeletal:      Comments: Symmetrical movement of upper extremities  Symmetrical movement of lower extremities  No visible deformities   Skin:     General: Skin is warm and dry.      Coloration: Skin is not jaundiced.   Neurological:      General: No focal deficit present.      Mental Status: She is alert and oriented to person, place, and time.   Psychiatric:         Mood and Affect: Mood normal.         Behavior: Behavior normal.         Thought Content: Thought content normal.     Results Review:  2/14/2018 colonoscopy due to personal history of polyps per Dr. Haines revealed diverticulosis and internal hemorrhoids.  Recommendations to start high-fiber diet and repeat colonoscopy in 10 years.    8/29/2018 colonoscopy per Dr. Mart due to 2.5 cm intramural abscess in rectosigmoid associated with recent hospitalization, history of complicated diverticulitis revealed colitis and proctitis with broad differential including residual from diverticulitis, antibiotic effect and inflammatory bowel disease, no evidence of neoplasia, fibrotic change to rectosigmoid from 10 to 25 cm with stricturing, few sigmoid diverticuli, pristine appearing distal ileum, Prometheus test for IBD ordered.  Office to follow-up to discuss history and findings and today's pathology once testing has resulted.  Left colon biopsies without abnormality.  Rectal biopsies revealed reactive colonic mucosa with review of pathology comment that reveals likely due to prior mucosal injury, no evidence of active colitis.    8/31/2018 inflammatory bowel disease SGI diagnostic panel and consistent with inflammatory bowel disease.    9/24/2018, documentation per Dr. Mart of negative Prometheus testing, it was documented patient declined recommended surgery,  follow-up with infectious disease in place, return if difficulties arise.  She was previously treated with IV Invanz, patient reported history of left upper quadrant pain, symptoms aggravated by constipation.  It was documented first episode of diverticulitis was 2 months prior with prior evaluation with CT and colonoscopy.  She was asymptomatic at the visit.  CMP          9/25/2023    08:52 3/26/2024    09:58   CMP   Glucose 113  103    BUN 16  15    Creatinine 0.79  0.76    EGFR 81.6  84.9    Sodium 139  136    Potassium 3.9  4.1    Chloride 100  97    Calcium 10.0  9.2    Total Protein 6.8  6.6    Albumin 4.5  4.3    Globulin 2.3  2.3    Total Bilirubin 0.7  0.4    Alkaline Phosphatase 74  72    AST (SGOT) 34  32    ALT (SGPT) 29  31    Albumin/Globulin Ratio 2.0  1.9    BUN/Creatinine Ratio 20.3  19.7    Anion Gap 9.0  11.4      CBC          9/25/2023    08:52 3/26/2024    09:58   CBC   WBC 6.42  5.98    RBC 4.66  4.58    Hemoglobin 13.7  13.1    Hematocrit 40.8  38.8    MCV 87.6  84.7    MCH 29.4  28.6    MCHC 33.6  33.8    RDW 13.7  13.4    Platelets 243  240        ASSESSMENT / PLAN  1. Diverticulosis  2. History of diverticulitis  3. History of colonoscopy  Colonoscopy February 2018 per Dr. Haines with documentation due to history of polyps that revealed diverticulosis with recommendation to repeat colonoscopy in 10 years.  Repeat colonoscopy 8/29/2018 per Dr. Mart due to complicated diverticulitis revealed colitis and proctitis, fibrotic change to rectosigmoid from 10 to 25 cm with stricturing, few sigmoid diverticuli, pristine appearance of distal ileum with subsequent Prometheus IBD panel that was inconsistent with inflammatory bowel disease.  Left colon biopsies without abnormality.  Rectal biopsies revealed reactive colonic mucosa likely due to prior mucosal injury without evidence of active colitis  -Suspect she is due for repeat colonoscopy for screening 8/29/2028 but if she is contacted by Dr. Mart's  office sooner, recommend she notify the office that she is due for repeat colonoscopy.  If she is not contacted sooner, recommend repeat colonoscopy 8/29/2028 (I will request recall be created)  -Suspect colon resection previously discussed by Dr. Mart due to complicated diverticulitis to try to prevent future episodes (possibly due to fibrotic change to rectosigmoid from 10 to 25 cm with stricturing?)  -Recommend she avoid constipation and continue fiber supplement.  Due to prior gastric bypass it does not seem as though she can tolerate high-fiber diet.      Return if symptoms worsen or fail to improve.    DELFINO Briggs thank you  08/30/2024

## 2024-09-22 DIAGNOSIS — I10 PRIMARY HYPERTENSION: ICD-10-CM

## 2024-09-23 RX ORDER — OLMESARTAN MEDOXOMIL AND HYDROCHLOROTHIAZIDE 40/25 40; 25 MG/1; MG/1
1 TABLET ORAL EVERY MORNING
Qty: 90 TABLET | Refills: 2 | Status: SHIPPED | OUTPATIENT
Start: 2024-09-23

## 2024-09-24 DIAGNOSIS — M15.9 PRIMARY OSTEOARTHRITIS INVOLVING MULTIPLE JOINTS: ICD-10-CM

## 2024-09-24 RX ORDER — TRAMADOL HYDROCHLORIDE 50 MG/1
50 TABLET ORAL EVERY 6 HOURS PRN
Qty: 120 TABLET | Refills: 2 | Status: SHIPPED | OUTPATIENT
Start: 2024-09-24

## 2024-10-07 RX ORDER — TRAZODONE HYDROCHLORIDE 50 MG/1
50 TABLET, FILM COATED ORAL NIGHTLY
Qty: 90 TABLET | Refills: 3 | Status: SHIPPED | OUTPATIENT
Start: 2024-10-07

## 2024-10-08 ENCOUNTER — PATIENT OUTREACH (OUTPATIENT)
Dept: CASE MANAGEMENT | Facility: OTHER | Age: 69
End: 2024-10-08
Payer: MEDICARE

## 2024-10-08 NOTE — OUTREACH NOTE
AMBULATORY CASE MANAGEMENT NOTE    Names and Relationships of Patient/Support Persons: Contact: Demetria Paris; Relationship: Self -     MyChart Hypertension Care Companion Enrollment    Date/Time of successful contact: 10/8/2024  2:17 PM  Patient response: not interested  Enrolling provider: Abraham Sousa MD       Call back to patient after response to my chart message. She is not interested in program at this time.   [facility] Proactive Outreach  BP Readings from Last 3 Encounters:   08/30/24 170/70   03/26/24 144/74   11/07/23 140/70         Darlyn MAZARIEGOS  Ambulatory Case Management    10/8/2024, 14:17 EDT          Never

## 2024-10-11 ENCOUNTER — OFFICE VISIT (OUTPATIENT)
Dept: INTERNAL MEDICINE | Facility: CLINIC | Age: 69
End: 2024-10-11
Payer: MEDICARE

## 2024-10-11 ENCOUNTER — LAB (OUTPATIENT)
Dept: INTERNAL MEDICINE | Facility: CLINIC | Age: 69
End: 2024-10-11
Payer: MEDICARE

## 2024-10-11 VITALS
SYSTOLIC BLOOD PRESSURE: 150 MMHG | HEART RATE: 60 BPM | OXYGEN SATURATION: 98 % | BODY MASS INDEX: 25.95 KG/M2 | HEIGHT: 64 IN | WEIGHT: 152 LBS | DIASTOLIC BLOOD PRESSURE: 78 MMHG

## 2024-10-11 DIAGNOSIS — M19.91 PRIMARY OSTEOARTHRITIS, UNSPECIFIED SITE: ICD-10-CM

## 2024-10-11 DIAGNOSIS — R73.02 GLUCOSE INTOLERANCE (IMPAIRED GLUCOSE TOLERANCE): ICD-10-CM

## 2024-10-11 DIAGNOSIS — E78.5 ELEVATED LIPIDS: ICD-10-CM

## 2024-10-11 DIAGNOSIS — E03.9 ACQUIRED HYPOTHYROIDISM: ICD-10-CM

## 2024-10-11 DIAGNOSIS — Z00.00 ENCOUNTER FOR MEDICARE ANNUAL WELLNESS EXAM: ICD-10-CM

## 2024-10-11 DIAGNOSIS — I10 PRIMARY HYPERTENSION: Primary | ICD-10-CM

## 2024-10-11 DIAGNOSIS — E03.9 ACQUIRED HYPOTHYROIDISM: Primary | ICD-10-CM

## 2024-10-11 DIAGNOSIS — K57.30 DIVERTICULOSIS OF LARGE INTESTINE WITHOUT HEMORRHAGE: ICD-10-CM

## 2024-10-11 DIAGNOSIS — K21.9 GERD WITHOUT ESOPHAGITIS: ICD-10-CM

## 2024-10-11 LAB
ALBUMIN SERPL-MCNC: 4.4 G/DL (ref 3.5–5.2)
ALBUMIN/GLOB SERPL: 1.9 G/DL
ALP SERPL-CCNC: 79 U/L (ref 39–117)
ALT SERPL W P-5'-P-CCNC: 42 U/L (ref 1–33)
ANION GAP SERPL CALCULATED.3IONS-SCNC: 12.7 MMOL/L (ref 5–15)
AST SERPL-CCNC: 42 U/L (ref 1–32)
BILIRUB BLD-MCNC: NEGATIVE MG/DL
BILIRUB SERPL-MCNC: 0.5 MG/DL (ref 0–1.2)
BUN SERPL-MCNC: 17 MG/DL (ref 8–23)
BUN/CREAT SERPL: 21.5 (ref 7–25)
CALCIUM SPEC-SCNC: 9.7 MG/DL (ref 8.6–10.5)
CHLORIDE SERPL-SCNC: 93 MMOL/L (ref 98–107)
CHOLEST SERPL-MCNC: 178 MG/DL (ref 0–200)
CLARITY, POC: CLEAR
CO2 SERPL-SCNC: 30.3 MMOL/L (ref 22–29)
COLOR UR: YELLOW
CREAT SERPL-MCNC: 0.79 MG/DL (ref 0.57–1)
DEPRECATED RDW RBC AUTO: 40.2 FL (ref 37–54)
EGFRCR SERPLBLD CKD-EPI 2021: 81.1 ML/MIN/1.73
ERYTHROCYTE [DISTWIDTH] IN BLOOD BY AUTOMATED COUNT: 13.1 % (ref 12.3–15.4)
EXPIRATION DATE: NORMAL
GLOBULIN UR ELPH-MCNC: 2.3 GM/DL
GLUCOSE SERPL-MCNC: 107 MG/DL (ref 65–99)
GLUCOSE UR STRIP-MCNC: NEGATIVE MG/DL
HBA1C MFR BLD: 5.7 % (ref 4.8–5.6)
HCT VFR BLD AUTO: 39.7 % (ref 34–46.6)
HDLC SERPL-MCNC: 83 MG/DL (ref 40–60)
HGB BLD-MCNC: 13 G/DL (ref 12–15.9)
KETONES UR QL: NEGATIVE
LDLC SERPL CALC-MCNC: 83 MG/DL (ref 0–100)
LDLC/HDLC SERPL: 1 {RATIO}
LEUKOCYTE EST, POC: NEGATIVE
Lab: NORMAL
MCH RBC QN AUTO: 27.4 PG (ref 26.6–33)
MCHC RBC AUTO-ENTMCNC: 32.7 G/DL (ref 31.5–35.7)
MCV RBC AUTO: 83.8 FL (ref 79–97)
NITRITE UR-MCNC: NEGATIVE MG/ML
PH UR: 7.5 [PH] (ref 5–8)
PLATELET # BLD AUTO: 231 10*3/MM3 (ref 140–450)
PMV BLD AUTO: 10.5 FL (ref 6–12)
POTASSIUM SERPL-SCNC: 3.6 MMOL/L (ref 3.5–5.2)
PROT SERPL-MCNC: 6.7 G/DL (ref 6–8.5)
PROT UR STRIP-MCNC: NEGATIVE MG/DL
RBC # BLD AUTO: 4.74 10*6/MM3 (ref 3.77–5.28)
RBC # UR STRIP: NEGATIVE /UL
SODIUM SERPL-SCNC: 136 MMOL/L (ref 136–145)
SP GR UR: 1.01 (ref 1–1.03)
TRIGL SERPL-MCNC: 61 MG/DL (ref 0–150)
TSH SERPL DL<=0.05 MIU/L-ACNC: 3.33 UIU/ML (ref 0.27–4.2)
UROBILINOGEN UR QL: NORMAL
VIT B12 BLD-MCNC: 534 PG/ML (ref 211–946)
VLDLC SERPL-MCNC: 12 MG/DL (ref 5–40)
WBC NRBC COR # BLD AUTO: 6.07 10*3/MM3 (ref 3.4–10.8)

## 2024-10-11 PROCEDURE — 83036 HEMOGLOBIN GLYCOSYLATED A1C: CPT | Performed by: INTERNAL MEDICINE

## 2024-10-11 PROCEDURE — 80053 COMPREHEN METABOLIC PANEL: CPT | Performed by: INTERNAL MEDICINE

## 2024-10-11 PROCEDURE — 84443 ASSAY THYROID STIM HORMONE: CPT | Performed by: INTERNAL MEDICINE

## 2024-10-11 PROCEDURE — 36415 COLL VENOUS BLD VENIPUNCTURE: CPT | Performed by: INTERNAL MEDICINE

## 2024-10-11 PROCEDURE — 85027 COMPLETE CBC AUTOMATED: CPT | Performed by: INTERNAL MEDICINE

## 2024-10-11 PROCEDURE — 82607 VITAMIN B-12: CPT | Performed by: INTERNAL MEDICINE

## 2024-10-11 PROCEDURE — 80061 LIPID PANEL: CPT | Performed by: INTERNAL MEDICINE

## 2024-10-11 RX ORDER — AMLODIPINE BESYLATE 2.5 MG/1
2.5 TABLET ORAL NIGHTLY
Qty: 90 TABLET | Refills: 3 | Status: SHIPPED | OUTPATIENT
Start: 2024-10-11

## 2024-10-11 NOTE — PROGRESS NOTES
The ABCs of the Annual Wellness Visit  Subsequent Medicare Wellness Visit    Chief Complaint   Patient presents with    Annual Exam      Subjective    History of Present Illness:  Demetria Paris is a 68 y.o. female who presents for a Subsequent Medicare Wellness Visit.    Has been getting SBP in the 150s.  No dizziness or lightheadedness. Started back on Celebrex.  No HAs.  No edema.  Compliant with meds    The following portions of the patient's history were reviewed and   updated as appropriate: current medications, past family history, past medical history, past social history, past surgical history, and problem list.     Compared to one year ago, the patient feels her physical   health is the same.    Compared to one year ago, the patient feels her mental   health is the same.    Recent Hospitalizations:  She was not admitted to the hospital during the last year.       Current Medical Providers:  Patient Care Team:  Abraham Sousa MD as PCP - General Diallo, DELFINO Phoenix as Nurse Practitioner (Obstetrics and Gynecology)    Outpatient Medications Prior to Visit   Medication Sig Dispense Refill    acetaminophen (TYLENOL) 325 MG tablet Take 2 tablets by mouth Every 6 (Six) Hours As Needed for Mild Pain .      bisoprolol-hydrochlorothiazide (ZIAC) 5-6.25 MG per tablet Take 1 tablet by mouth Daily. 90 tablet 3    calcium carbonate (OS-HARPER) 600 MG tablet Take 1 tablet by mouth Every Night.      estradiol (CLIMARA) 0.1 MG/24HR patch 0.5 patches 1 (One) Time Per Week.      levothyroxine (SYNTHROID, LEVOTHROID) 175 MCG tablet TAKE ONE TABLET BY MOUTH EVERY MORNING 90 tablet 3    methylcellulose oral powder       Multiple Vitamins-Minerals (MULTIVITAMIN ADULT PO) Take 1 tablet by mouth Daily.      olmesartan-hydrochlorothiazide (BENICAR HCT) 40-25 MG per tablet TAKE ONE TABLET BY MOUTH EVERY MORNING 90 tablet 2    omeprazole (priLOSEC) 40 MG capsule Take 1 capsule by mouth Daily. 90 capsule 3    polyethylene  glycol (MiraLax) 17 g packet Take 17 g by mouth Daily.      pravastatin (PRAVACHOL) 40 MG tablet TAKE ONE TABLET BY MOUTH ONCE NIGHTLY 90 tablet 3    traMADol (ULTRAM) 50 MG tablet Take 1 tablet by mouth Every 6 (Six) Hours As Needed for Moderate Pain. 120 tablet 2    traZODone (DESYREL) 50 MG tablet TAKE ONE TABLET BY MOUTH ONCE NIGHTLY 90 tablet 3     No facility-administered medications prior to visit.       Opioid medication/s are on active medication list.  and I have evaluated her active treatment plan and pain score trends (see table).  Vitals:    10/11/24 0825   PainSc:   2     I have reviewed the chart for potential of high risk medication and harmful drug interactions in the elderly.          Aspirin is not on active medication list.  Aspirin use is not indicated based on review of current medical condition/s. Risk of harm outweighs potential benefits.  .    Fall Risk Assessment was completed, and patient is at low risk for falls.      Patient Active Problem List   Diagnosis    Elevated lipids    GERD without esophagitis    Hypertension    Hypothyroidism    Glucose intolerance (impaired glucose tolerance)    Migraine with aura    Joint pain    Pre-op examination    Diverticulosis of large intestine without hemorrhage    Primary insomnia    Primary osteoarthritis    Dupuytren's contracture of hand    Pre-op examination    Benign essential hypertension    Encounter for gynecological examination (general) (routine) with abnormal findings    Hormone replacement therapy    Normal gynecologic examination    Diverticulitis of large intestine with perforation and abscess without bleeding    Gastroesophageal reflux disease     Advance Care Planning   Advance Directive is not on file.  ACP discussion was held with the patient during this visit. Patient does not have an advance directive, information provided.    Review of Systems   Constitutional:  Negative for diaphoresis.   HENT:  Negative for congestion, ear  "pain, facial swelling, hearing loss, nosebleeds, postnasal drip, sinus pressure and sore throat.    Eyes:  Negative for pain, discharge and itching.   Respiratory:  Negative for cough, chest tightness, shortness of breath and wheezing.    Cardiovascular:  Negative for palpitations and leg swelling.   Gastrointestinal:  Negative for abdominal distention, blood in stool, diarrhea, nausea and vomiting.        2/18 colonoscopy without polyps, next 2028   Endocrine: Negative for polydipsia and polyuria.   Genitourinary:  Negative for difficulty urinating, dysuria, frequency and hematuria.        11/23 mammogram   Musculoskeletal:  Positive for arthralgias. Negative for back pain, gait problem, joint swelling, myalgias and neck pain.   Skin:  Negative for rash and wound.   Neurological:  Negative for dizziness, syncope, weakness and headaches.   Psychiatric/Behavioral:  Negative for dysphoric mood and sleep disturbance. The patient is not nervous/anxious.          Objective       Vitals:    10/11/24 0825   BP: 150/78   BP Location: Left arm   Patient Position: Sitting   Pulse: 60   SpO2: 98%   Weight: 68.9 kg (152 lb)   Height: 163.8 cm (64.49\")   PainSc:   2     BMI Readings from Last 1 Encounters:   10/11/24 25.70 kg/m²   BMI is within normal parameters. No follow-up required.  BMI Readings from Last 1 Encounters:   10/11/24 25.70 kg/m²   BMI is above normal parameters. Recommendations include: exercise counseling and nutrition counseling    Does the patient have evidence of cognitive impairment? No    Physical Exam  Constitutional:       Appearance: Normal appearance. She is well-developed.   HENT:      Head: Normocephalic and atraumatic.      Right Ear: External ear normal.      Left Ear: External ear normal.      Nose: Nose normal.      Mouth/Throat:      Mouth: Mucous membranes are moist.      Pharynx: Oropharynx is clear.   Eyes:      Extraocular Movements: Extraocular movements intact.      Conjunctiva/sclera: " Conjunctivae normal.      Pupils: Pupils are equal, round, and reactive to light.   Cardiovascular:      Rate and Rhythm: Normal rate and regular rhythm.      Heart sounds: Normal heart sounds.   Pulmonary:      Effort: Pulmonary effort is normal.      Breath sounds: Normal breath sounds.   Abdominal:      General: Bowel sounds are normal.      Palpations: Abdomen is soft.   Musculoskeletal:         General: Normal range of motion.      Cervical back: Normal range of motion and neck supple.   Lymphadenopathy:      Cervical: No cervical adenopathy.   Skin:     General: Skin is warm and dry.   Neurological:      General: No focal deficit present.      Mental Status: She is alert and oriented to person, place, and time.   Psychiatric:         Mood and Affect: Mood normal.         Behavior: Behavior normal.         Thought Content: Thought content normal.       Lab Results   Component Value Date    TRIG 61 10/11/2024    HDL 83 (H) 10/11/2024    LDL 83 10/11/2024    VLDL 12 10/11/2024    HGBA1C 5.70 (H) 10/11/2024            HEALTH RISK ASSESSMENT    Smoking Status:  Social History     Tobacco Use   Smoking Status Former   Smokeless Tobacco Never     Alcohol Consumption:  Social History     Substance and Sexual Activity   Alcohol Use Never     Fall Risk Screen:    STEADI Fall Risk Assessment was completed, and patient is at LOW risk for falls.Assessment completed on:10/11/2024    Depression Screening:      10/11/2024     8:00 AM   PHQ-2/PHQ-9 Depression Screening   Retired Little Interest or Pleasure in Doing Things 0-->not at all   Retired Feeling Down, Depressed or Hopeless 0-->not at all   Retired PHQ-9: Brief Depression Severity Measure Score 0       Health Habits and Functional and Cognitive Screening:      10/11/2024     8:00 AM   Functional & Cognitive Status   Do you have difficulty preparing food and eating? No   Do you have difficulty bathing yourself, getting dressed or grooming yourself? No   Do you have  difficulty using the toilet? No   Do you have difficulty moving around from place to place? No   Do you have trouble with steps or getting out of a bed or a chair? No   Current Diet Other   Dental Exam Up to date   Eye Exam Up to date   Exercise (times per week) 3 times per week   Current Exercises Include Walking   Do you need help using the phone?  No   Are you deaf or do you have serious difficulty hearing?  No   Do you need help to go to places out of walking distance? No   Do you need help shopping? No   Do you need help preparing meals?  No   Do you need help with housework?  No   Do you need help with laundry? No   Do you need help taking your medications? No   Do you need help managing money? No   Do you ever drive or ride in a car without wearing a seat belt? No   Have you felt unusual stress, anger or loneliness in the last month? Yes   Who do you live with? Spouse   If you need help, do you have trouble finding someone available to you? No   Have you been bothered in the last four weeks by sexual problems? No   Do you have difficulty concentrating, remembering or making decisions? Yes       Age-appropriate Screening Schedule:  Refer to the list below for future screening recommendations based on patient's age, sex and/or medical conditions. Orders for these recommended tests are listed in the plan section. The patient has been provided with a written plan.    Health Maintenance   Topic Date Due    TDAP/TD VACCINES (1 - Tdap) Never done    ZOSTER VACCINE (1 of 2) Never done    HEPATITIS C SCREENING  Never done    PAP SMEAR  Never done    Pneumococcal Vaccine 65+ (1 of 1 - PCV) Never done    COVID-19 Vaccine (4 - 2023-24 season) 09/01/2024    INFLUENZA VACCINE  03/31/2025 (Originally 8/1/2024)    ANNUAL WELLNESS VISIT  10/11/2025    BMI FOLLOWUP  10/11/2025    DXA SCAN  11/28/2025    MAMMOGRAM  12/22/2025    COLORECTAL CANCER SCREENING  08/29/2028              Assessment & Plan     CMS Preventative Services  Quick Reference  Risk Factors Identified During Encounter  Immunizations Discussed/Encouraged: Tdap, Influenza, Prevnar 20 (Pneumococcal 20-valent conjugate), Shingrix, COVID19, and RSV (Respiratory Syncytial Virus)  Inactivity/Sedentary: Patient was advised to exercise at least 150 minutes a week per CDC recommendations.  Polypharmacy: Medication List reviewed and Medications are appropriate for patient  The above risks/problems have been discussed with the patient.  Follow up actions/plans if indicated are seen below in the Assessment/Plan Section.  Pertinent information has been shared with the patient in the After Visit Summary.    Diagnoses and all orders for this visit:    1. Primary hypertension (Primary)  -     amLODIPine (NORVASC) 2.5 MG tablet; Take 1 tablet by mouth Every Night.  Dispense: 90 tablet; Refill: 3    2. Elevated lipids    3. Acquired hypothyroidism    4. Glucose intolerance (impaired glucose tolerance)  -     Hemoglobin A1c    5. GERD without esophagitis    6. Diverticulosis of large intestine without hemorrhage    7. Primary osteoarthritis, unspecified site    8. Encounter for Medicare annual wellness exam  -     CBC (No Diff)  -     Comprehensive Metabolic Panel  -     Lipid Panel  -     Hemoglobin A1c  -     TSH  -     Vitamin B12  -     POC Urinalysis Dipstick, Automated        Follow Up:   Return in about 6 weeks (around 11/22/2024) for Recheck.     An After Visit Summary and PPPS were given to the patient.             hyperlipidemia-labs today on statin, counseled on diet  htn-advised low NA and exercise, goal of <130/80, counseled on wt loss efforts, will add low dose CCB  hypothryoid-lab today at goal  Glucose intolerance-advised low carb/ncs, AIC today at goal  gerd-controlled on omeprazole  Diverticular dz-advised adequate fiber intake, asymptomatic  Insomnia-controlled on trazodone  DJD/Dupuytren contracture-Celebrex prn and ortho when ready  Vit def-recheck given hx of gastric  bypass  Chest pain-myoview neg      10/11 labs noted and dw patient

## 2024-10-22 DIAGNOSIS — I10 ESSENTIAL HYPERTENSION: ICD-10-CM

## 2024-10-22 RX ORDER — BISOPROLOL FUMARATE AND HYDROCHLOROTHIAZIDE 5; 6.25 MG/1; MG/1
1 TABLET ORAL DAILY
Qty: 90 TABLET | Refills: 3 | Status: SHIPPED | OUTPATIENT
Start: 2024-10-22

## 2024-11-06 DIAGNOSIS — K21.9 GERD WITHOUT ESOPHAGITIS: ICD-10-CM

## 2024-11-06 RX ORDER — OMEPRAZOLE 40 MG/1
40 CAPSULE, DELAYED RELEASE ORAL DAILY
Qty: 90 CAPSULE | Refills: 3 | Status: SHIPPED | OUTPATIENT
Start: 2024-11-06

## 2024-11-19 ENCOUNTER — OFFICE VISIT (OUTPATIENT)
Dept: INTERNAL MEDICINE | Facility: CLINIC | Age: 69
End: 2024-11-19
Payer: MEDICARE

## 2024-11-19 ENCOUNTER — TRANSCRIBE ORDERS (OUTPATIENT)
Dept: ADMINISTRATIVE | Facility: HOSPITAL | Age: 69
End: 2024-11-19
Payer: MEDICARE

## 2024-11-19 VITALS
WEIGHT: 152 LBS | HEART RATE: 64 BPM | BODY MASS INDEX: 25.95 KG/M2 | HEIGHT: 64 IN | OXYGEN SATURATION: 97 % | SYSTOLIC BLOOD PRESSURE: 140 MMHG | DIASTOLIC BLOOD PRESSURE: 64 MMHG

## 2024-11-19 DIAGNOSIS — Z12.31 VISIT FOR SCREENING MAMMOGRAM: Primary | ICD-10-CM

## 2024-11-19 DIAGNOSIS — K21.9 GASTROESOPHAGEAL REFLUX DISEASE WITHOUT ESOPHAGITIS: ICD-10-CM

## 2024-11-19 DIAGNOSIS — I10 PRIMARY HYPERTENSION: Primary | ICD-10-CM

## 2024-11-19 DIAGNOSIS — E03.9 ACQUIRED HYPOTHYROIDISM: ICD-10-CM

## 2024-11-19 DIAGNOSIS — E78.5 ELEVATED LIPIDS: ICD-10-CM

## 2024-11-19 DIAGNOSIS — I10 BENIGN ESSENTIAL HYPERTENSION: ICD-10-CM

## 2024-11-19 DIAGNOSIS — R73.02 GLUCOSE INTOLERANCE (IMPAIRED GLUCOSE TOLERANCE): ICD-10-CM

## 2024-11-19 PROCEDURE — 1125F AMNT PAIN NOTED PAIN PRSNT: CPT | Performed by: INTERNAL MEDICINE

## 2024-11-19 PROCEDURE — 3077F SYST BP >= 140 MM HG: CPT | Performed by: INTERNAL MEDICINE

## 2024-11-19 PROCEDURE — 3078F DIAST BP <80 MM HG: CPT | Performed by: INTERNAL MEDICINE

## 2024-11-19 PROCEDURE — 99214 OFFICE O/P EST MOD 30 MIN: CPT | Performed by: INTERNAL MEDICINE

## 2024-11-19 NOTE — PROGRESS NOTES
Patient is a 68 y.o. female who is here for a follow up of hyperlipidemia,hypertension and hypothyroidism.  Chief Complaint   Patient presents with    Hyperlipidemia    Hypertension    Hypothyroidism         HPI:    Here for mgmt of HTN and hypothyroid.  Last visit we added amlodipine.  BP has up and down.  Occasional dizziness.  No HAs.  Tried stopping the Celebrex.  No abdominal pains.  No fever or chills.  No falls.     History:     Patient Active Problem List   Diagnosis    Elevated lipids    GERD without esophagitis    Hypertension    Hypothyroidism    Glucose intolerance (impaired glucose tolerance)    Migraine with aura    Joint pain    Pre-op examination    Diverticulosis of large intestine without hemorrhage    Primary insomnia    Primary osteoarthritis    Dupuytren's contracture of hand    Pre-op examination    Benign essential hypertension    Encounter for gynecological examination (general) (routine) with abnormal findings    Hormone replacement therapy    Normal gynecologic examination    Diverticulitis of large intestine with perforation and abscess without bleeding    Gastroesophageal reflux disease       Past Medical History:   Diagnosis Date    Chemical exposure     phenteramine    Diverticulosis     Encounter for gynecological examination (general) (routine) with abnormal findings 2/6/2020    Fibroid, uterine     Joint pain     Migraine with aura     Needle stick injury        Past Surgical History:   Procedure Laterality Date    ABDOMINAL HYSTERECTOMY Bilateral 2007    Removal Of Both Ovaries    BREAST BIOPSY Left     BREAST BIOPSY Right     BREAST CYST ASPIRATION Right     BREAST EXCISIONAL BIOPSY Left     COLONOSCOPY  02/14/2018    DIAGNOSTIC LAPAROSCOPY  02/2024    AMOS  and sling by Dr Diallo    ENDOSCOPY      GASTRIC BYPASS      INCISIONAL BREAST BIOPSY Left 1998    OOPHORECTOMY      TOTAL KNEE ARTHROPLASTY Right     TUBAL ABDOMINAL LIGATION  1980       Current Outpatient Medications on File  Prior to Visit   Medication Sig    acetaminophen (TYLENOL) 325 MG tablet Take 2 tablets by mouth Every 6 (Six) Hours As Needed for Mild Pain .    amLODIPine (NORVASC) 2.5 MG tablet Take 1 tablet by mouth Every Night.    bisoprolol-hydrochlorothiazide (ZIAC) 5-6.25 MG per tablet TAKE 1 TABLET BY MOUTH DAILY    calcium carbonate (OS-HARPER) 600 MG tablet Take 1 tablet by mouth Every Night.    estradiol (CLIMARA) 0.1 MG/24HR patch 0.5 patches 1 (One) Time Per Week.    levothyroxine (SYNTHROID, LEVOTHROID) 175 MCG tablet TAKE ONE TABLET BY MOUTH EVERY MORNING    methylcellulose oral powder     Multiple Vitamins-Minerals (MULTIVITAMIN ADULT PO) Take 1 tablet by mouth Daily.    olmesartan-hydrochlorothiazide (BENICAR HCT) 40-25 MG per tablet TAKE ONE TABLET BY MOUTH EVERY MORNING    omeprazole (priLOSEC) 40 MG capsule Take 1 capsule by mouth Daily.    polyethylene glycol (MiraLax) 17 g packet Take 17 g by mouth Daily.    pravastatin (PRAVACHOL) 40 MG tablet TAKE ONE TABLET BY MOUTH ONCE NIGHTLY    traMADol (ULTRAM) 50 MG tablet Take 1 tablet by mouth Every 6 (Six) Hours As Needed for Moderate Pain.    traZODone (DESYREL) 50 MG tablet TAKE ONE TABLET BY MOUTH ONCE NIGHTLY     No current facility-administered medications on file prior to visit.       Family History   Problem Relation Age of Onset    Breast cancer Paternal Aunt 50    Diabetes Maternal Grandmother     Stroke Maternal Grandfather     Heart disease Paternal Grandfather     Heart disease Other     Diabetes Other     Stroke Other     Ovarian cancer Neg Hx     Colon cancer Neg Hx        Social History     Socioeconomic History    Marital status:    Tobacco Use    Smoking status: Former    Smokeless tobacco: Never   Vaping Use    Vaping status: Never Used   Substance and Sexual Activity    Alcohol use: Never    Drug use: No    Sexual activity: Defer         Review of Systems   Constitutional:  Negative for diaphoresis.   HENT:  Negative for congestion, ear  "pain, facial swelling, hearing loss, nosebleeds, postnasal drip, sinus pressure and sore throat.    Eyes:  Negative for pain, discharge and itching.   Respiratory:  Negative for cough, chest tightness, shortness of breath and wheezing.    Cardiovascular:  Negative for palpitations and leg swelling.   Gastrointestinal:  Negative for abdominal distention, blood in stool, diarrhea, nausea and vomiting.        2/18 colonoscopy without polyps, next 2028   Endocrine: Negative for polydipsia and polyuria.   Genitourinary:  Negative for difficulty urinating, dysuria, frequency and hematuria.        11/23 mammogram   Musculoskeletal:  Positive for arthralgias. Negative for back pain, gait problem, joint swelling, myalgias and neck pain.   Skin:  Negative for rash and wound.   Neurological:  Negative for dizziness, syncope, weakness and headaches.   Psychiatric/Behavioral:  Negative for dysphoric mood and sleep disturbance. The patient is not nervous/anxious.        /64 (BP Location: Left arm, Patient Position: Sitting)   Pulse 64   Ht 163.8 cm (64.49\")   Wt 68.9 kg (152 lb)   LMP  (LMP Unknown)   SpO2 97%   BMI 25.70 kg/m²       Physical Exam  Constitutional:       Appearance: Normal appearance. She is well-developed.   HENT:      Head: Normocephalic and atraumatic.      Right Ear: External ear normal.      Left Ear: External ear normal.      Nose: Nose normal.      Mouth/Throat:      Mouth: Mucous membranes are moist.      Pharynx: Oropharynx is clear.   Eyes:      Extraocular Movements: Extraocular movements intact.      Conjunctiva/sclera: Conjunctivae normal.      Pupils: Pupils are equal, round, and reactive to light.   Cardiovascular:      Rate and Rhythm: Normal rate and regular rhythm.      Heart sounds: Normal heart sounds.   Pulmonary:      Effort: Pulmonary effort is normal.      Breath sounds: Normal breath sounds.   Abdominal:      General: Bowel sounds are normal.      Palpations: Abdomen is soft. "   Musculoskeletal:         General: Normal range of motion.      Cervical back: Normal range of motion and neck supple.   Lymphadenopathy:      Cervical: No cervical adenopathy.   Skin:     General: Skin is warm and dry.   Neurological:      General: No focal deficit present.      Mental Status: She is alert and oriented to person, place, and time.   Psychiatric:         Mood and Affect: Mood normal.         Behavior: Behavior normal.         Thought Content: Thought content normal.         Procedure:      Historical Data:    hyperlipidemia-labs today on statin, counseled on diet  htn-advised low NA and exercise, goal of <150/80, counseled on wt loss efforts, if needed will increase the amlodipine to 5 mg  hypothryoid-lab at goal  Glucose intolerance-advised low carb/ncs, AIC at goal  gerd-controlled on omeprazole  Diverticular dz-advised adequate fiber intake, asymptomatic  Insomnia-controlled on trazodone  DJD/Dupuytren contracture-Celebrex prn and ortho when ready  Vit def-recheck given hx of gastric bypass  Chest pain-myoview neg      10/11 labs noted and dw patient    Current Outpatient Medications:     acetaminophen (TYLENOL) 325 MG tablet, Take 2 tablets by mouth Every 6 (Six) Hours As Needed for Mild Pain ., Disp: , Rfl:     amLODIPine (NORVASC) 2.5 MG tablet, Take 1 tablet by mouth Every Night., Disp: 90 tablet, Rfl: 3    bisoprolol-hydrochlorothiazide (ZIAC) 5-6.25 MG per tablet, TAKE 1 TABLET BY MOUTH DAILY, Disp: 90 tablet, Rfl: 3    calcium carbonate (OS-HARPER) 600 MG tablet, Take 1 tablet by mouth Every Night., Disp: , Rfl:     estradiol (CLIMARA) 0.1 MG/24HR patch, 0.5 patches 1 (One) Time Per Week., Disp: , Rfl:     levothyroxine (SYNTHROID, LEVOTHROID) 175 MCG tablet, TAKE ONE TABLET BY MOUTH EVERY MORNING, Disp: 90 tablet, Rfl: 3    methylcellulose oral powder, , Disp: , Rfl:     Multiple Vitamins-Minerals (MULTIVITAMIN ADULT PO), Take 1 tablet by mouth Daily., Disp: , Rfl:      olmesartan-hydrochlorothiazide (BENICAR HCT) 40-25 MG per tablet, TAKE ONE TABLET BY MOUTH EVERY MORNING, Disp: 90 tablet, Rfl: 2    omeprazole (priLOSEC) 40 MG capsule, Take 1 capsule by mouth Daily., Disp: 90 capsule, Rfl: 3    polyethylene glycol (MiraLax) 17 g packet, Take 17 g by mouth Daily., Disp: , Rfl:     pravastatin (PRAVACHOL) 40 MG tablet, TAKE ONE TABLET BY MOUTH ONCE NIGHTLY, Disp: 90 tablet, Rfl: 3    traMADol (ULTRAM) 50 MG tablet, Take 1 tablet by mouth Every 6 (Six) Hours As Needed for Moderate Pain., Disp: 120 tablet, Rfl: 2    traZODone (DESYREL) 50 MG tablet, TAKE ONE TABLET BY MOUTH ONCE NIGHTLY, Disp: 90 tablet, Rfl: 3        Diagnoses and all orders for this visit:    1. Primary hypertension (Primary)    2. Elevated lipids    3. Benign essential hypertension    4. Acquired hypothyroidism    5. Glucose intolerance (impaired glucose tolerance)    6. Gastroesophageal reflux disease without esophagitis

## 2025-01-06 RX ORDER — PRAVASTATIN SODIUM 40 MG
40 TABLET ORAL NIGHTLY
Qty: 90 TABLET | Refills: 3 | Status: SHIPPED | OUTPATIENT
Start: 2025-01-06

## 2025-01-17 LAB
NCCN CRITERIA FLAG: NORMAL
TYRER CUZICK SCORE: 3.5

## 2025-01-24 ENCOUNTER — HOSPITAL ENCOUNTER (OUTPATIENT)
Dept: MAMMOGRAPHY | Facility: HOSPITAL | Age: 70
Discharge: HOME | End: 2025-01-24
Admitting: NURSE PRACTITIONER
Payer: MEDICARE

## 2025-01-24 DIAGNOSIS — Z12.31 VISIT FOR SCREENING MAMMOGRAM: ICD-10-CM

## 2025-01-24 PROCEDURE — 77063 BREAST TOMOSYNTHESIS BI: CPT

## 2025-01-24 PROCEDURE — 77067 SCR MAMMO BI INCL CAD: CPT

## 2025-02-26 DIAGNOSIS — E78.5 ELEVATED LIPIDS: Primary | ICD-10-CM

## 2025-02-28 ENCOUNTER — LAB (OUTPATIENT)
Dept: INTERNAL MEDICINE | Facility: CLINIC | Age: 70
End: 2025-02-28
Payer: MEDICARE

## 2025-02-28 PROCEDURE — 80053 COMPREHEN METABOLIC PANEL: CPT | Performed by: INTERNAL MEDICINE

## 2025-03-01 DIAGNOSIS — B35.1 ONYCHOMYCOSIS: Primary | ICD-10-CM

## 2025-03-01 LAB
ALBUMIN SERPL-MCNC: 4.4 G/DL (ref 3.5–5.2)
ALBUMIN/GLOB SERPL: 1.7 G/DL
ALP SERPL-CCNC: 79 U/L (ref 39–117)
ALT SERPL W P-5'-P-CCNC: 46 U/L (ref 1–33)
ANION GAP SERPL CALCULATED.3IONS-SCNC: 11.7 MMOL/L (ref 5–15)
AST SERPL-CCNC: 47 U/L (ref 1–32)
BILIRUB SERPL-MCNC: 0.4 MG/DL (ref 0–1.2)
BUN SERPL-MCNC: 16 MG/DL (ref 8–23)
BUN/CREAT SERPL: 22.9 (ref 7–25)
CALCIUM SPEC-SCNC: 9.4 MG/DL (ref 8.6–10.5)
CHLORIDE SERPL-SCNC: 93 MMOL/L (ref 98–107)
CO2 SERPL-SCNC: 28.3 MMOL/L (ref 22–29)
CREAT SERPL-MCNC: 0.7 MG/DL (ref 0.57–1)
EGFRCR SERPLBLD CKD-EPI 2021: 93.2 ML/MIN/1.73
GLOBULIN UR ELPH-MCNC: 2.6 GM/DL
GLUCOSE SERPL-MCNC: 201 MG/DL (ref 65–99)
POTASSIUM SERPL-SCNC: 3.8 MMOL/L (ref 3.5–5.2)
PROT SERPL-MCNC: 7 G/DL (ref 6–8.5)
SODIUM SERPL-SCNC: 133 MMOL/L (ref 136–145)

## 2025-03-01 RX ORDER — EFINACONAZOLE 100 MG/ML
1 SOLUTION TOPICAL DAILY
Qty: 8 ML | Refills: 2 | Status: SHIPPED | OUTPATIENT
Start: 2025-03-01

## 2025-03-18 DIAGNOSIS — E03.8 OTHER SPECIFIED HYPOTHYROIDISM: ICD-10-CM

## 2025-03-18 DIAGNOSIS — M15.0 PRIMARY OSTEOARTHRITIS INVOLVING MULTIPLE JOINTS: ICD-10-CM

## 2025-03-18 RX ORDER — LEVOTHYROXINE SODIUM 175 UG/1
175 TABLET ORAL EVERY MORNING
Qty: 90 TABLET | Refills: 3 | Status: SHIPPED | OUTPATIENT
Start: 2025-03-18 | End: 2025-03-19

## 2025-03-18 RX ORDER — TRAMADOL HYDROCHLORIDE 50 MG/1
50 TABLET ORAL EVERY 6 HOURS PRN
Qty: 120 TABLET | Refills: 2 | Status: SHIPPED | OUTPATIENT
Start: 2025-03-18

## 2025-03-19 DIAGNOSIS — E03.8 OTHER SPECIFIED HYPOTHYROIDISM: ICD-10-CM

## 2025-03-19 RX ORDER — LEVOTHYROXINE SODIUM 175 UG/1
175 TABLET ORAL EVERY MORNING
Qty: 90 TABLET | Refills: 3 | Status: SHIPPED | OUTPATIENT
Start: 2025-03-19

## 2025-04-23 ENCOUNTER — LAB (OUTPATIENT)
Dept: INTERNAL MEDICINE | Facility: CLINIC | Age: 70
End: 2025-04-23
Payer: MEDICARE

## 2025-04-23 ENCOUNTER — OFFICE VISIT (OUTPATIENT)
Dept: INTERNAL MEDICINE | Facility: CLINIC | Age: 70
End: 2025-04-23
Payer: MEDICARE

## 2025-04-23 VITALS
SYSTOLIC BLOOD PRESSURE: 130 MMHG | DIASTOLIC BLOOD PRESSURE: 70 MMHG | WEIGHT: 153 LBS | HEART RATE: 57 BPM | HEIGHT: 64 IN | OXYGEN SATURATION: 98 % | BODY MASS INDEX: 26.12 KG/M2

## 2025-04-23 DIAGNOSIS — I10 PRIMARY HYPERTENSION: Primary | ICD-10-CM

## 2025-04-23 DIAGNOSIS — K21.9 GASTROESOPHAGEAL REFLUX DISEASE WITHOUT ESOPHAGITIS: ICD-10-CM

## 2025-04-23 DIAGNOSIS — E78.5 ELEVATED LIPIDS: ICD-10-CM

## 2025-04-23 DIAGNOSIS — E03.9 ACQUIRED HYPOTHYROIDISM: ICD-10-CM

## 2025-04-23 DIAGNOSIS — Z00.00 HEALTHCARE MAINTENANCE: Primary | ICD-10-CM

## 2025-04-23 DIAGNOSIS — K57.30 DIVERTICULOSIS OF LARGE INTESTINE WITHOUT HEMORRHAGE: ICD-10-CM

## 2025-04-23 DIAGNOSIS — R73.02 GLUCOSE INTOLERANCE (IMPAIRED GLUCOSE TOLERANCE): ICD-10-CM

## 2025-04-23 DIAGNOSIS — F51.01 PRIMARY INSOMNIA: ICD-10-CM

## 2025-04-23 LAB
ALBUMIN SERPL-MCNC: 4.1 G/DL (ref 3.5–5.2)
ALBUMIN/GLOB SERPL: 1.6 G/DL
ALP SERPL-CCNC: 79 U/L (ref 39–117)
ALT SERPL W P-5'-P-CCNC: 36 U/L (ref 1–33)
ANION GAP SERPL CALCULATED.3IONS-SCNC: 8.7 MMOL/L (ref 5–15)
AST SERPL-CCNC: 36 U/L (ref 1–32)
BILIRUB SERPL-MCNC: 0.4 MG/DL (ref 0–1.2)
BUN SERPL-MCNC: 12 MG/DL (ref 8–23)
BUN/CREAT SERPL: 15.8 (ref 7–25)
CALCIUM SPEC-SCNC: 9.5 MG/DL (ref 8.6–10.5)
CHLORIDE SERPL-SCNC: 94 MMOL/L (ref 98–107)
CHOLEST SERPL-MCNC: 158 MG/DL (ref 0–200)
CO2 SERPL-SCNC: 29.3 MMOL/L (ref 22–29)
CREAT SERPL-MCNC: 0.76 MG/DL (ref 0.57–1)
DEPRECATED RDW RBC AUTO: 41.2 FL (ref 37–54)
EGFRCR SERPLBLD CKD-EPI 2021: 84.4 ML/MIN/1.73
ERYTHROCYTE [DISTWIDTH] IN BLOOD BY AUTOMATED COUNT: 13.6 % (ref 12.3–15.4)
GLOBULIN UR ELPH-MCNC: 2.5 GM/DL
GLUCOSE SERPL-MCNC: 119 MG/DL (ref 65–99)
HBA1C MFR BLD: 5.7 % (ref 4.8–5.6)
HCT VFR BLD AUTO: 37.8 % (ref 34–46.6)
HDLC SERPL-MCNC: 79 MG/DL (ref 40–60)
HGB BLD-MCNC: 12.6 G/DL (ref 12–15.9)
LDLC SERPL CALC-MCNC: 65 MG/DL (ref 0–100)
LDLC/HDLC SERPL: 0.81 {RATIO}
MCH RBC QN AUTO: 27.7 PG (ref 26.6–33)
MCHC RBC AUTO-ENTMCNC: 33.3 G/DL (ref 31.5–35.7)
MCV RBC AUTO: 83.1 FL (ref 79–97)
PLATELET # BLD AUTO: 219 10*3/MM3 (ref 140–450)
PMV BLD AUTO: 10.1 FL (ref 6–12)
POTASSIUM SERPL-SCNC: 3.8 MMOL/L (ref 3.5–5.2)
PROT SERPL-MCNC: 6.6 G/DL (ref 6–8.5)
RBC # BLD AUTO: 4.55 10*6/MM3 (ref 3.77–5.28)
SODIUM SERPL-SCNC: 132 MMOL/L (ref 136–145)
TRIGL SERPL-MCNC: 76 MG/DL (ref 0–150)
TSH SERPL DL<=0.05 MIU/L-ACNC: 0.57 UIU/ML (ref 0.27–4.2)
VLDLC SERPL-MCNC: 14 MG/DL (ref 5–40)
WBC NRBC COR # BLD AUTO: 5 10*3/MM3 (ref 3.4–10.8)

## 2025-04-23 PROCEDURE — 36415 COLL VENOUS BLD VENIPUNCTURE: CPT | Performed by: INTERNAL MEDICINE

## 2025-04-23 PROCEDURE — 83036 HEMOGLOBIN GLYCOSYLATED A1C: CPT | Performed by: INTERNAL MEDICINE

## 2025-04-23 PROCEDURE — 84443 ASSAY THYROID STIM HORMONE: CPT | Performed by: INTERNAL MEDICINE

## 2025-04-23 PROCEDURE — 80053 COMPREHEN METABOLIC PANEL: CPT | Performed by: INTERNAL MEDICINE

## 2025-04-23 PROCEDURE — 80061 LIPID PANEL: CPT | Performed by: INTERNAL MEDICINE

## 2025-04-23 PROCEDURE — 85027 COMPLETE CBC AUTOMATED: CPT | Performed by: INTERNAL MEDICINE

## 2025-04-23 NOTE — PROGRESS NOTES
Patient is a 69 y.o. female who is here for edema  Chief Complaint   Patient presents with    Edema         HPI:    Here for mgmt of ankle edema.  Onset past month.  Worst at end of the month. No increase in baseline MATA.  No PND or CP or palpitations.  BP has been good.     History:     Patient Active Problem List   Diagnosis    Elevated lipids    GERD without esophagitis    Hypertension    Hypothyroidism    Glucose intolerance (impaired glucose tolerance)    Migraine with aura    Joint pain    Pre-op examination    Diverticulosis of large intestine without hemorrhage    Primary insomnia    Primary osteoarthritis    Dupuytren's contracture of hand    Pre-op examination    Benign essential hypertension    Encounter for gynecological examination (general) (routine) with abnormal findings    Hormone replacement therapy    Normal gynecologic examination    Diverticulitis of large intestine with perforation and abscess without bleeding    Gastroesophageal reflux disease       Past Medical History:   Diagnosis Date    Chemical exposure     phenteramine    Diverticulosis     Encounter for gynecological examination (general) (routine) with abnormal findings 2/6/2020    Fibroid, uterine     Joint pain     Migraine with aura     Needle stick injury        Past Surgical History:   Procedure Laterality Date    ABDOMINAL HYSTERECTOMY Bilateral 2007    Removal Of Both Ovaries    BREAST BIOPSY Left     BREAST BIOPSY Right     BREAST CYST ASPIRATION Right     BREAST EXCISIONAL BIOPSY Left     COLONOSCOPY  02/14/2018    DIAGNOSTIC LAPAROSCOPY  02/2024    AMOS  and sling by Dr Diallo    ENDOSCOPY      GASTRIC BYPASS      INCISIONAL BREAST BIOPSY Left 1998    OOPHORECTOMY      TOTAL KNEE ARTHROPLASTY Right     TUBAL ABDOMINAL LIGATION  1980       Current Outpatient Medications on File Prior to Visit   Medication Sig    acetaminophen (TYLENOL) 325 MG tablet Take 2 tablets by mouth Every 6 (Six) Hours As Needed for Mild Pain .     amLODIPine (NORVASC) 2.5 MG tablet Take 1 tablet by mouth Every Night.    bisoprolol-hydrochlorothiazide (ZIAC) 5-6.25 MG per tablet TAKE 1 TABLET BY MOUTH DAILY    calcium carbonate (OS-HARPER) 600 MG tablet Take 1 tablet by mouth Every Night.    estradiol (CLIMARA) 0.1 MG/24HR patch 0.5 patches 1 (One) Time Per Week.    levothyroxine (SYNTHROID, LEVOTHROID) 175 MCG tablet TAKE 1 TABLET BY MOUTH EVERY MORNING    methylcellulose oral powder     Multiple Vitamins-Minerals (MULTIVITAMIN ADULT PO) Take 1 tablet by mouth Daily.    olmesartan-hydrochlorothiazide (BENICAR HCT) 40-25 MG per tablet TAKE ONE TABLET BY MOUTH EVERY MORNING    omeprazole (priLOSEC) 40 MG capsule Take 1 capsule by mouth Daily.    polyethylene glycol (MiraLax) 17 g packet Take 17 g by mouth Daily.    pravastatin (PRAVACHOL) 40 MG tablet TAKE ONE TABLET BY MOUTH ONCE NIGHTLY    traMADol (ULTRAM) 50 MG tablet Take 1 tablet by mouth Every 6 (Six) Hours As Needed for Moderate Pain.    traZODone (DESYREL) 50 MG tablet TAKE ONE TABLET BY MOUTH ONCE NIGHTLY    [DISCONTINUED] Efinaconazole (Jublia) 10 % solution Apply 1 Application topically Daily.     No current facility-administered medications on file prior to visit.       Family History   Problem Relation Age of Onset    Breast cancer Paternal Aunt 50    Diabetes Maternal Grandmother     Stroke Maternal Grandfather     Heart disease Paternal Grandfather     Heart disease Other     Diabetes Other     Stroke Other     Ovarian cancer Neg Hx     Colon cancer Neg Hx        Social History     Socioeconomic History    Marital status:    Tobacco Use    Smoking status: Former    Smokeless tobacco: Never   Vaping Use    Vaping status: Never Used   Substance and Sexual Activity    Alcohol use: Never    Drug use: No    Sexual activity: Defer         Review of Systems   Constitutional:  Negative for diaphoresis.   HENT:  Negative for congestion, ear pain, facial swelling, hearing loss, nosebleeds, postnasal  "drip, sinus pressure and sore throat.    Eyes:  Negative for pain, discharge and itching.   Respiratory:  Negative for cough, chest tightness, shortness of breath and wheezing.    Cardiovascular:  Positive for leg swelling. Negative for palpitations.   Gastrointestinal:  Negative for abdominal distention, blood in stool, diarrhea, nausea and vomiting.        2/18 colonoscopy without polyps, next 2028   Endocrine: Negative for polydipsia and polyuria.   Genitourinary:  Negative for difficulty urinating, dysuria, frequency and hematuria.        11/23 mammogram   Musculoskeletal:  Positive for arthralgias. Negative for back pain, gait problem, joint swelling, myalgias and neck pain.   Skin:  Negative for rash and wound.   Neurological:  Negative for dizziness, syncope, weakness and headaches.   Psychiatric/Behavioral:  Negative for dysphoric mood and sleep disturbance. The patient is not nervous/anxious.        /70   Pulse 57   Ht 163.8 cm (64.49\")   Wt 69.4 kg (153 lb)   LMP  (LMP Unknown)   SpO2 98%   BMI 25.87 kg/m²       Physical Exam  Constitutional:       Appearance: Normal appearance. She is well-developed.   HENT:      Head: Normocephalic and atraumatic.      Right Ear: External ear normal.      Left Ear: External ear normal.      Nose: Nose normal.      Mouth/Throat:      Mouth: Mucous membranes are moist.      Pharynx: Oropharynx is clear.   Eyes:      Extraocular Movements: Extraocular movements intact.      Conjunctiva/sclera: Conjunctivae normal.      Pupils: Pupils are equal, round, and reactive to light.   Cardiovascular:      Rate and Rhythm: Normal rate and regular rhythm.      Heart sounds: Normal heart sounds.   Pulmonary:      Effort: Pulmonary effort is normal.      Breath sounds: Normal breath sounds.   Abdominal:      General: Bowel sounds are normal.      Palpations: Abdomen is soft.   Musculoskeletal:         General: Normal range of motion.      Cervical back: Normal range of " motion and neck supple.      Right lower leg: Edema (minimal) present.      Left lower leg: Edema (minimal) present.   Lymphadenopathy:      Cervical: No cervical adenopathy.   Skin:     General: Skin is warm and dry.   Neurological:      General: No focal deficit present.      Mental Status: She is alert and oriented to person, place, and time.   Psychiatric:         Mood and Affect: Mood normal.         Behavior: Behavior normal.         Thought Content: Thought content normal.         Procedure:      Historical Data:    hyperlipidemia-labs today on statin, counseled on diet  htn-advised low NA and exercise, goal of <150/80, counseled on wt loss efforts, with edema will hold CCB if worsens, if BP not at goal then will add Cardura  hypothryoid-labs today  Glucose intolerance-advised low carb/ncs, AIC today  gerd-controlled on omeprazole  Diverticular dz-advised adequate fiber intake, asymptomatic  Insomnia-controlled on trazodone  DJD/Dupuytren contracture-Celebrex prn and ortho when ready  Vit def-recheck given hx of gastric bypass  Chest pain-myoview neg  Edema-labs today, see above      4/23 labs noted and dw patient    Current Outpatient Medications:     acetaminophen (TYLENOL) 325 MG tablet, Take 2 tablets by mouth Every 6 (Six) Hours As Needed for Mild Pain ., Disp: , Rfl:     amLODIPine (NORVASC) 2.5 MG tablet, Take 1 tablet by mouth Every Night., Disp: 90 tablet, Rfl: 3    bisoprolol-hydrochlorothiazide (ZIAC) 5-6.25 MG per tablet, TAKE 1 TABLET BY MOUTH DAILY, Disp: 90 tablet, Rfl: 3    calcium carbonate (OS-HARPER) 600 MG tablet, Take 1 tablet by mouth Every Night., Disp: , Rfl:     estradiol (CLIMARA) 0.1 MG/24HR patch, 0.5 patches 1 (One) Time Per Week., Disp: , Rfl:     levothyroxine (SYNTHROID, LEVOTHROID) 175 MCG tablet, TAKE 1 TABLET BY MOUTH EVERY MORNING, Disp: 90 tablet, Rfl: 3    methylcellulose oral powder, , Disp: , Rfl:     Multiple Vitamins-Minerals (MULTIVITAMIN ADULT PO), Take 1 tablet by  mouth Daily., Disp: , Rfl:     olmesartan-hydrochlorothiazide (BENICAR HCT) 40-25 MG per tablet, TAKE ONE TABLET BY MOUTH EVERY MORNING, Disp: 90 tablet, Rfl: 2    omeprazole (priLOSEC) 40 MG capsule, Take 1 capsule by mouth Daily., Disp: 90 capsule, Rfl: 3    polyethylene glycol (MiraLax) 17 g packet, Take 17 g by mouth Daily., Disp: , Rfl:     pravastatin (PRAVACHOL) 40 MG tablet, TAKE ONE TABLET BY MOUTH ONCE NIGHTLY, Disp: 90 tablet, Rfl: 3    traMADol (ULTRAM) 50 MG tablet, Take 1 tablet by mouth Every 6 (Six) Hours As Needed for Moderate Pain., Disp: 120 tablet, Rfl: 2    traZODone (DESYREL) 50 MG tablet, TAKE ONE TABLET BY MOUTH ONCE NIGHTLY, Disp: 90 tablet, Rfl: 3        Diagnoses and all orders for this visit:    1. Primary hypertension (Primary)  -     CBC (No Diff)    2. Elevated lipids  -     Comprehensive Metabolic Panel  -     Lipid Panel    3. Acquired hypothyroidism  -     TSH    4. Glucose intolerance (impaired glucose tolerance)  -     Hemoglobin A1c    5. Gastroesophageal reflux disease without esophagitis    6. Diverticulosis of large intestine without hemorrhage    7. Primary insomnia

## 2025-06-22 DIAGNOSIS — I10 PRIMARY HYPERTENSION: ICD-10-CM

## 2025-06-23 RX ORDER — OLMESARTAN MEDOXOMIL AND HYDROCHLOROTHIAZIDE 40/25 40; 25 MG/1; MG/1
1 TABLET ORAL EVERY MORNING
Qty: 90 TABLET | Refills: 2 | Status: SHIPPED | OUTPATIENT
Start: 2025-06-23

## 2025-07-03 ENCOUNTER — LAB (OUTPATIENT)
Dept: INTERNAL MEDICINE | Age: 70
End: 2025-07-03
Payer: MEDICARE

## 2025-07-03 ENCOUNTER — OFFICE VISIT (OUTPATIENT)
Dept: INTERNAL MEDICINE | Age: 70
End: 2025-07-03
Payer: MEDICARE

## 2025-07-03 VITALS
DIASTOLIC BLOOD PRESSURE: 68 MMHG | WEIGHT: 154 LBS | OXYGEN SATURATION: 98 % | SYSTOLIC BLOOD PRESSURE: 124 MMHG | BODY MASS INDEX: 26.29 KG/M2 | HEART RATE: 64 BPM | HEIGHT: 64 IN

## 2025-07-03 DIAGNOSIS — E03.9 ACQUIRED HYPOTHYROIDISM: ICD-10-CM

## 2025-07-03 DIAGNOSIS — E78.5 ELEVATED LIPIDS: ICD-10-CM

## 2025-07-03 DIAGNOSIS — R73.02 GLUCOSE INTOLERANCE (IMPAIRED GLUCOSE TOLERANCE): ICD-10-CM

## 2025-07-03 DIAGNOSIS — K21.9 GERD WITHOUT ESOPHAGITIS: ICD-10-CM

## 2025-07-03 DIAGNOSIS — Z00.00 HEALTHCARE MAINTENANCE: Primary | ICD-10-CM

## 2025-07-03 DIAGNOSIS — I10 PRIMARY HYPERTENSION: Primary | ICD-10-CM

## 2025-07-03 DIAGNOSIS — F51.01 PRIMARY INSOMNIA: ICD-10-CM

## 2025-07-03 DIAGNOSIS — K57.30 DIVERTICULOSIS OF LARGE INTESTINE WITHOUT HEMORRHAGE: ICD-10-CM

## 2025-07-03 LAB
CHOLEST SERPL-MCNC: 180 MG/DL (ref 0–200)
DEPRECATED RDW RBC AUTO: 44 FL (ref 37–54)
ERYTHROCYTE [DISTWIDTH] IN BLOOD BY AUTOMATED COUNT: 13.8 % (ref 12.3–15.4)
HBA1C MFR BLD: 6 % (ref 4.8–5.6)
HCT VFR BLD AUTO: 38.9 % (ref 34–46.6)
HDLC SERPL-MCNC: 80 MG/DL (ref 40–60)
HGB BLD-MCNC: 11.9 G/DL (ref 12–15.9)
LDLC SERPL CALC-MCNC: 89 MG/DL (ref 0–100)
LDLC/HDLC SERPL: 1.11 {RATIO}
MCH RBC QN AUTO: 26.5 PG (ref 26.6–33)
MCHC RBC AUTO-ENTMCNC: 30.6 G/DL (ref 31.5–35.7)
MCV RBC AUTO: 86.6 FL (ref 79–97)
PLATELET # BLD AUTO: 220 10*3/MM3 (ref 140–450)
PMV BLD AUTO: 9.7 FL (ref 6–12)
RBC # BLD AUTO: 4.49 10*6/MM3 (ref 3.77–5.28)
TRIGL SERPL-MCNC: 58 MG/DL (ref 0–150)
TSH SERPL DL<=0.05 MIU/L-ACNC: 0.84 UIU/ML (ref 0.27–4.2)
VLDLC SERPL-MCNC: 11 MG/DL (ref 5–40)
WBC NRBC COR # BLD AUTO: 6.56 10*3/MM3 (ref 3.4–10.8)

## 2025-07-03 PROCEDURE — 80053 COMPREHEN METABOLIC PANEL: CPT | Performed by: INTERNAL MEDICINE

## 2025-07-03 PROCEDURE — 84443 ASSAY THYROID STIM HORMONE: CPT | Performed by: INTERNAL MEDICINE

## 2025-07-03 PROCEDURE — 36415 COLL VENOUS BLD VENIPUNCTURE: CPT | Performed by: INTERNAL MEDICINE

## 2025-07-03 PROCEDURE — 83036 HEMOGLOBIN GLYCOSYLATED A1C: CPT | Performed by: INTERNAL MEDICINE

## 2025-07-03 PROCEDURE — 85027 COMPLETE CBC AUTOMATED: CPT | Performed by: INTERNAL MEDICINE

## 2025-07-03 PROCEDURE — 80061 LIPID PANEL: CPT | Performed by: INTERNAL MEDICINE

## 2025-07-03 NOTE — PROGRESS NOTES
Patient is a 69 y.o. female who is here for a follow up of hyperlipidemia,hypertension and hypothyroidism  Chief Complaint   Patient presents with    Hyperlipidemia    Hypertension    Hypothyroidism         HPI:    Here for mgmt of HTN and hypothyroid.  BP has been good.  Rare dizziness.  No abdominal pains.  Energy level is good  GERD is controlled on PPI.      History:     Patient Active Problem List   Diagnosis    Elevated lipids    GERD without esophagitis    Hypertension    Hypothyroidism    Glucose intolerance (impaired glucose tolerance)    Migraine with aura    Joint pain    Pre-op examination    Diverticulosis of large intestine without hemorrhage    Primary insomnia    Primary osteoarthritis    Dupuytren's contracture of hand    Pre-op examination    Benign essential hypertension    Encounter for gynecological examination (general) (routine) with abnormal findings    Hormone replacement therapy    Normal gynecologic examination    Diverticulitis of large intestine with perforation and abscess without bleeding    Gastroesophageal reflux disease       Past Medical History:   Diagnosis Date    Chemical exposure     phenteramine    Diverticulosis     Encounter for gynecological examination (general) (routine) with abnormal findings 2/6/2020    Fibroid, uterine     Joint pain     Migraine with aura     Needle stick injury        Past Surgical History:   Procedure Laterality Date    ABDOMINAL HYSTERECTOMY Bilateral 2007    Removal Of Both Ovaries    BREAST BIOPSY Left     BREAST BIOPSY Right     BREAST CYST ASPIRATION Right     BREAST EXCISIONAL BIOPSY Left     COLONOSCOPY  02/14/2018    DIAGNOSTIC LAPAROSCOPY  02/2024    AMOS  and sling by Dr Diallo    ENDOSCOPY      GASTRIC BYPASS      INCISIONAL BREAST BIOPSY Left 1998    OOPHORECTOMY      TOTAL KNEE ARTHROPLASTY Right     TUBAL ABDOMINAL LIGATION  1980       Current Outpatient Medications on File Prior to Visit   Medication Sig    acetaminophen (TYLENOL) 325  MG tablet Take 2 tablets by mouth Every 6 (Six) Hours As Needed for Mild Pain .    bisoprolol-hydrochlorothiazide (ZIAC) 5-6.25 MG per tablet TAKE 1 TABLET BY MOUTH DAILY    calcium carbonate (OS-HARPER) 600 MG tablet Take 1 tablet by mouth Every Night.    estradiol (CLIMARA) 0.1 MG/24HR patch 0.5 patches 1 (One) Time Per Week.    levothyroxine (SYNTHROID, LEVOTHROID) 175 MCG tablet TAKE 1 TABLET BY MOUTH EVERY MORNING    methylcellulose oral powder     Multiple Vitamins-Minerals (MULTIVITAMIN ADULT PO) Take 1 tablet by mouth Daily.    olmesartan-hydrochlorothiazide (BENICAR HCT) 40-25 MG per tablet TAKE 1 TABLET BY MOUTH EVERY MORNING    omeprazole (priLOSEC) 40 MG capsule Take 1 capsule by mouth Daily.    polyethylene glycol (MiraLax) 17 g packet Take 17 g by mouth Daily.    pravastatin (PRAVACHOL) 40 MG tablet TAKE ONE TABLET BY MOUTH ONCE NIGHTLY    traMADol (ULTRAM) 50 MG tablet Take 1 tablet by mouth Every 6 (Six) Hours As Needed for Moderate Pain.    traZODone (DESYREL) 50 MG tablet TAKE ONE TABLET BY MOUTH ONCE NIGHTLY    amLODIPine (NORVASC) 2.5 MG tablet Take 1 tablet by mouth Every Night. (Patient not taking: Reported on 7/3/2025)     No current facility-administered medications on file prior to visit.       Family History   Problem Relation Age of Onset    Breast cancer Paternal Aunt 50    Diabetes Maternal Grandmother     Stroke Maternal Grandfather     Heart disease Paternal Grandfather     Heart disease Other     Diabetes Other     Stroke Other     Ovarian cancer Neg Hx     Colon cancer Neg Hx        Social History     Socioeconomic History    Marital status:    Tobacco Use    Smoking status: Former    Smokeless tobacco: Never   Vaping Use    Vaping status: Never Used   Substance and Sexual Activity    Alcohol use: Never    Drug use: No    Sexual activity: Defer         Review of Systems   Constitutional:  Negative for diaphoresis.   HENT:  Negative for congestion, ear pain, facial swelling,  "hearing loss, nosebleeds, postnasal drip, sinus pressure and sore throat.    Eyes:  Negative for pain, discharge and itching.   Respiratory:  Negative for cough, chest tightness, shortness of breath and wheezing.    Cardiovascular:  Negative for palpitations.   Gastrointestinal:  Negative for abdominal distention, blood in stool, diarrhea, nausea and vomiting.        2/18 colonoscopy without polyps, next 2028   Endocrine: Negative for polydipsia and polyuria.   Genitourinary:  Negative for difficulty urinating, dysuria, frequency and hematuria.        1/25 mammogram   Musculoskeletal:  Positive for arthralgias. Negative for back pain, gait problem, joint swelling, myalgias and neck pain.   Skin:  Negative for rash and wound.   Neurological:  Negative for dizziness, syncope, weakness and headaches.   Psychiatric/Behavioral:  Negative for dysphoric mood and sleep disturbance. The patient is not nervous/anxious.        /68 (BP Location: Left arm, Patient Position: Sitting)   Pulse 64   Ht 163.8 cm (64.49\")   Wt 69.9 kg (154 lb)   LMP  (LMP Unknown)   SpO2 98%   BMI 26.04 kg/m²       Physical Exam  Constitutional:       Appearance: Normal appearance. She is well-developed.   HENT:      Head: Normocephalic and atraumatic.      Right Ear: External ear normal.      Left Ear: External ear normal.      Nose: Nose normal.      Mouth/Throat:      Mouth: Mucous membranes are moist.      Pharynx: Oropharynx is clear.   Eyes:      Extraocular Movements: Extraocular movements intact.      Conjunctiva/sclera: Conjunctivae normal.      Pupils: Pupils are equal, round, and reactive to light.   Cardiovascular:      Rate and Rhythm: Normal rate and regular rhythm.      Heart sounds: Normal heart sounds.   Pulmonary:      Effort: Pulmonary effort is normal.      Breath sounds: Normal breath sounds.   Abdominal:      General: Bowel sounds are normal.      Palpations: Abdomen is soft.   Musculoskeletal:         General: Normal " range of motion.      Cervical back: Normal range of motion and neck supple.      Right lower leg: Edema (minimal) present.      Left lower leg: Edema (minimal) present.   Lymphadenopathy:      Cervical: No cervical adenopathy.   Skin:     General: Skin is warm and dry.   Neurological:      General: No focal deficit present.      Mental Status: She is alert and oriented to person, place, and time.   Psychiatric:         Mood and Affect: Mood normal.         Behavior: Behavior normal.         Thought Content: Thought content normal.         Procedure:      Historical Data:    hyperlipidemia-labs today on statin, counseled on diet  htn-advised low NA and exercise, goal of <150/80, counseled on wt loss efforts, if BP not at goal then will add Cardura  hypothryoid-labs today at goal  Glucose intolerance-advised low carb/ncs, AIC today noted and dw patient  gerd-controlled on omeprazole  Diverticular dz-advised adequate fiber intake, asymptomatic  Insomnia-controlled on trazodone  DJD/Dupuytren contracture-Celebrex prn and ortho when ready  Vit def-recheck given hx of gastric bypass  Chest pain-myoview neg      7/3 labs noted and dw patient    Current Outpatient Medications:     acetaminophen (TYLENOL) 325 MG tablet, Take 2 tablets by mouth Every 6 (Six) Hours As Needed for Mild Pain ., Disp: , Rfl:     bisoprolol-hydrochlorothiazide (ZIAC) 5-6.25 MG per tablet, TAKE 1 TABLET BY MOUTH DAILY, Disp: 90 tablet, Rfl: 3    calcium carbonate (OS-AHRPER) 600 MG tablet, Take 1 tablet by mouth Every Night., Disp: , Rfl:     estradiol (CLIMARA) 0.1 MG/24HR patch, 0.5 patches 1 (One) Time Per Week., Disp: , Rfl:     levothyroxine (SYNTHROID, LEVOTHROID) 175 MCG tablet, TAKE 1 TABLET BY MOUTH EVERY MORNING, Disp: 90 tablet, Rfl: 3    methylcellulose oral powder, , Disp: , Rfl:     Multiple Vitamins-Minerals (MULTIVITAMIN ADULT PO), Take 1 tablet by mouth Daily., Disp: , Rfl:     olmesartan-hydrochlorothiazide (BENICAR HCT) 40-25 MG per  tablet, TAKE 1 TABLET BY MOUTH EVERY MORNING, Disp: 90 tablet, Rfl: 2    omeprazole (priLOSEC) 40 MG capsule, Take 1 capsule by mouth Daily., Disp: 90 capsule, Rfl: 3    polyethylene glycol (MiraLax) 17 g packet, Take 17 g by mouth Daily., Disp: , Rfl:     pravastatin (PRAVACHOL) 40 MG tablet, TAKE ONE TABLET BY MOUTH ONCE NIGHTLY, Disp: 90 tablet, Rfl: 3    traMADol (ULTRAM) 50 MG tablet, Take 1 tablet by mouth Every 6 (Six) Hours As Needed for Moderate Pain., Disp: 120 tablet, Rfl: 2    traZODone (DESYREL) 50 MG tablet, TAKE ONE TABLET BY MOUTH ONCE NIGHTLY, Disp: 90 tablet, Rfl: 3    amLODIPine (NORVASC) 2.5 MG tablet, Take 1 tablet by mouth Every Night. (Patient not taking: Reported on 7/3/2025), Disp: 90 tablet, Rfl: 3        Diagnoses and all orders for this visit:    1. Primary hypertension (Primary)  -     CBC (No Diff)    2. Elevated lipids  -     Comprehensive Metabolic Panel  -     Lipid Panel    3. Acquired hypothyroidism  -     TSH    4. Glucose intolerance (impaired glucose tolerance)  -     Hemoglobin A1c    5. GERD without esophagitis    6. Diverticulosis of large intestine without hemorrhage    7. Primary insomnia

## 2025-07-04 LAB
ALBUMIN SERPL-MCNC: 4.1 G/DL (ref 3.5–5.2)
ALBUMIN/GLOB SERPL: 1.6 G/DL
ALP SERPL-CCNC: 68 U/L (ref 39–117)
ALT SERPL W P-5'-P-CCNC: 19 U/L (ref 1–33)
ANION GAP SERPL CALCULATED.3IONS-SCNC: 8 MMOL/L (ref 5–15)
AST SERPL-CCNC: 28 U/L (ref 1–32)
BILIRUB SERPL-MCNC: 0.5 MG/DL (ref 0–1.2)
BUN SERPL-MCNC: 15 MG/DL (ref 8–23)
BUN/CREAT SERPL: 19.5 (ref 7–25)
CALCIUM SPEC-SCNC: 9.4 MG/DL (ref 8.6–10.5)
CHLORIDE SERPL-SCNC: 97 MMOL/L (ref 98–107)
CO2 SERPL-SCNC: 28 MMOL/L (ref 22–29)
CREAT SERPL-MCNC: 0.77 MG/DL (ref 0.57–1)
EGFRCR SERPLBLD CKD-EPI 2021: 83.1 ML/MIN/1.73
GLOBULIN UR ELPH-MCNC: 2.6 GM/DL
GLUCOSE SERPL-MCNC: 105 MG/DL (ref 65–99)
POTASSIUM SERPL-SCNC: 4 MMOL/L (ref 3.5–5.2)
PROT SERPL-MCNC: 6.7 G/DL (ref 6–8.5)
SODIUM SERPL-SCNC: 133 MMOL/L (ref 136–145)

## 2025-07-09 DIAGNOSIS — M15.0 PRIMARY OSTEOARTHRITIS INVOLVING MULTIPLE JOINTS: ICD-10-CM

## 2025-07-10 DIAGNOSIS — F51.01 PRIMARY INSOMNIA: Primary | ICD-10-CM

## 2025-07-10 RX ORDER — TRAMADOL HYDROCHLORIDE 50 MG/1
50 TABLET ORAL EVERY 6 HOURS PRN
Qty: 120 TABLET | Refills: 2 | Status: SHIPPED | OUTPATIENT
Start: 2025-07-10

## 2025-07-10 RX ORDER — TRAZODONE HYDROCHLORIDE 50 MG/1
75 TABLET ORAL NIGHTLY
Qty: 145 TABLET | Refills: 3 | Status: SHIPPED | OUTPATIENT
Start: 2025-07-10

## 2025-08-11 ENCOUNTER — TRANSCRIBE ORDERS (OUTPATIENT)
Dept: ADMINISTRATIVE | Facility: HOSPITAL | Age: 70
End: 2025-08-11
Payer: MEDICARE

## 2025-08-11 DIAGNOSIS — Z12.31 ENCOUNTER FOR SCREENING MAMMOGRAM FOR MALIGNANT NEOPLASM OF BREAST: Primary | ICD-10-CM
